# Patient Record
Sex: FEMALE | Race: WHITE | NOT HISPANIC OR LATINO | URBAN - METROPOLITAN AREA
[De-identification: names, ages, dates, MRNs, and addresses within clinical notes are randomized per-mention and may not be internally consistent; named-entity substitution may affect disease eponyms.]

---

## 2021-06-12 ENCOUNTER — EMERGENCY (EMERGENCY)
Facility: HOSPITAL | Age: 86
LOS: 1 days | Discharge: HOME | End: 2021-06-12
Attending: EMERGENCY MEDICINE | Admitting: EMERGENCY MEDICINE
Payer: MEDICARE

## 2021-06-12 VITALS
HEART RATE: 95 BPM | OXYGEN SATURATION: 98 % | WEIGHT: 169.98 LBS | TEMPERATURE: 98 F | SYSTOLIC BLOOD PRESSURE: 154 MMHG | DIASTOLIC BLOOD PRESSURE: 78 MMHG | HEIGHT: 67 IN | RESPIRATION RATE: 17 BRPM

## 2021-06-12 VITALS
DIASTOLIC BLOOD PRESSURE: 84 MMHG | RESPIRATION RATE: 18 BRPM | OXYGEN SATURATION: 97 % | TEMPERATURE: 98 F | SYSTOLIC BLOOD PRESSURE: 163 MMHG | HEART RATE: 92 BPM

## 2021-06-12 DIAGNOSIS — Z96.642 PRESENCE OF LEFT ARTIFICIAL HIP JOINT: Chronic | ICD-10-CM

## 2021-06-12 DIAGNOSIS — Z96.659 PRESENCE OF UNSPECIFIED ARTIFICIAL KNEE JOINT: Chronic | ICD-10-CM

## 2021-06-12 DIAGNOSIS — Z96.649 PRESENCE OF UNSPECIFIED ARTIFICIAL HIP JOINT: Chronic | ICD-10-CM

## 2021-06-12 PROCEDURE — 73610 X-RAY EXAM OF ANKLE: CPT

## 2021-06-12 PROCEDURE — 73610 X-RAY EXAM OF ANKLE: CPT | Mod: 26,LT

## 2021-06-12 PROCEDURE — 99284 EMERGENCY DEPT VISIT MOD MDM: CPT | Mod: 25

## 2021-06-12 PROCEDURE — 73630 X-RAY EXAM OF FOOT: CPT

## 2021-06-12 PROCEDURE — 99283 EMERGENCY DEPT VISIT LOW MDM: CPT

## 2021-06-12 PROCEDURE — 73630 X-RAY EXAM OF FOOT: CPT | Mod: 26,LT

## 2021-06-12 NOTE — ED PROVIDER NOTE - PROGRESS NOTE DETAILS
Spoke with radiologist, no acute fractures on xrays, will place in ace wrap, RICE, nsaids for pain, f/u ortho.

## 2021-06-12 NOTE — ED ADULT NURSE NOTE - CAS DISCH ACCOMP BY
74 yo F with HTN, HLD, Hypothyroid, previous breast CA s/p lumpectomy in 2014 that is monitored yearly with mammograms that presents with chest pain. Benign exam and pt appears well. Now not tachy but HR in 90s. Pt reports she has a cardiologist Dr Eric Alarcon, has not seen him in possibly 2 years. never had a stress and last ECHO was possible 2 years ago. Concern is for ACS given RBBB on EKG which is unknown if new as pt and  reports EKG "always abnormal". Usual care at [facility] so no record of EKG in our system. Pt has no other associated symptoms of ACS but will consider ACS and PE in work up given tachy and chest discomfort. Will obtain labs, imaging, place on monitor and provide ASA and reassess. friend

## 2021-06-12 NOTE — ED ADULT NURSE NOTE - NSIMPLEMENTINTERV_GEN_ALL_ED
Implemented All Fall with Harm Risk Interventions:  [place] to call system. Call bell, personal items and telephone within reach. Instruct patient to call for assistance. Room bathroom lighting operational. Non-slip footwear when patient is off stretcher. Physically safe environment: no spills, clutter or unnecessary equipment. Stretcher in lowest position, wheels locked, appropriate side rails in place. Provide visual cue, wrist band, yellow gown, etc. Monitor gait and stability. Monitor for mental status changes and reorient to person, place, and time. Review medications for side effects contributing to fall risk. Reinforce activity limits and safety measures with patient and family. Provide visual clues: red socks.

## 2021-06-12 NOTE — ED ADULT NURSE NOTE - OBJECTIVE STATEMENT
Patient brought to ED by family friend. Patient tripped over the carpet at home 2 days ago and injured her left ankle. Denies any other injury, no head trauma, no LOC. Patient noted to have swelling across ankle with pain mostly localized lateral aspect. Patient has been able to weight bear but with difficulty and has been using a cane to get around which is not per her norm.

## 2021-06-12 NOTE — ED PROVIDER NOTE - OBJECTIVE STATEMENT
89 y/o F with no pmhx presents with c/o L ankle pain x 3 days. Pt states that she tripped on the rug at home on Thursday, 6/10 and injured her L ankle. States that she has swelling to her ankle and foot L. Denies applying ice, elevating leg or taking any pain meds at home. Denies head trauma, LOC, use of blood thinners, open wounds, numbness, tingling, neck/back/hip pain, other injuries/symptoms.

## 2021-06-12 NOTE — ED PROVIDER NOTE - MUSCULOSKELETAL, MLM
L foot: +ttp left lateral malleolus with swelling noted to ankle and foot L, skin intact, toes warm & mobile, pulses and sensation intact, +mild ecchymosis noted to base of medial proximal foot, skin intact, nl cap refill<2sec, NVI; L hip/thigh/knee/tib-fib NT with FROM

## 2021-06-12 NOTE — ED PROVIDER NOTE - CLINICAL SUMMARY MEDICAL DECISION MAKING FREE TEXT BOX
89 yo F with L ankle pain sp tripping on rug at home 3 days ago, no head trauma/loc/other injuries/symptoms, +swelling noted to L foot and ankle, NVI, pt declined pain meds in ED, will apply ice, elevate, xrays, splint/ace wrap, f/u ortho

## 2021-06-12 NOTE — ED ADULT NURSE NOTE - PSH
History of hip replacement, total, left    History of total hip arthroplasty  right with pinning  History of total knee replacement  right

## 2021-06-12 NOTE — ED PROVIDER NOTE - ATTENDING CONTRIBUTION TO CARE
89 yo F p/w left foot and ankle swelling x 3 days after mechanical trip and fall over a rug 3 days ago. no head trauma, loc, asa/plavix/blood thinners. declining analgesia. did not RICE it.   VSS  Left foot and ankle edema.   FROM ankle actively  +Right DP pulses +2  sensation intact    consider sprain vs fx  xray, rice, reassess

## 2021-06-12 NOTE — ED PROVIDER NOTE - CARE PROVIDER_API CALL
Triston Stokes  ORTHOPAEDIC SURGERY  [address], [address]  [place] [address]  Phone: [phone]  Fax: [phone]  Follow Up Time: 1-3 Days

## 2021-06-12 NOTE — ED PROVIDER NOTE - NSFOLLOWUPINSTRUCTIONS_ED_ALL_ED_FT
Follow up with Orthopedist in 1-2 days for re-evaluation, ongoing care and treatment. Rest, elevate ankle/foot, ace wrap for compressesion, motrin 600mg every 6 hours with food as needed for pain. If having worsening of symptoms or other related symptoms, RETURN TO THE ER IMMEDIATELY.     Ankle Sprain    WHAT YOU NEED TO KNOW:    An ankle sprain happens when 1 or more ligaments in your ankle joint stretch or tear. Ligaments are tough tissues that connect bones. Ligaments support your joints and keep your bones in place.    DISCHARGE INSTRUCTIONS:    Return to the emergency department if:   •You have severe pain in your ankle.      •Your foot or toes are cold or numb.      •Your ankle becomes more weak or unstable (wobbly).      •You are unable to put any weight on your ankle or foot.      •Your swelling has increased or returned.      Call your doctor if:   •Your pain does not go away, even after treatment.      •You have questions or concerns about your condition or care.      Medicines: You may need any of the following:   •NSAIDs, such as ibuprofen, help decrease swelling, pain, and fever. This medicine is available with or without a doctor's order. NSAIDs can cause stomach bleeding or kidney problems in certain people. If you take blood thinner medicine, always ask your healthcare provider if NSAIDs are safe for you. Always read the medicine label and follow directions.      •Acetaminophen decreases pain and fever. It is available without a doctor's order. Ask how much to take and how often to take it. Follow directions. Read the labels of all other medicines you are using to see if they also contain acetaminophen, or ask your doctor or pharmacist. Acetaminophen can cause liver damage if not taken correctly. Do not use more than 4 grams (4,000 milligrams) total of acetaminophen in one day.       •Prescription pain medicine may be given. Ask your healthcare provider how to take this medicine safely. Some prescription pain medicines contain acetaminophen. Do not take other medicines that contain acetaminophen without talking to your healthcare provider. Too much acetaminophen may cause liver damage. Prescription pain medicine may cause constipation. Ask your healthcare provider how to prevent or treat constipation.       •Take your medicine as directed. Contact your healthcare provider if you think your medicine is not helping or if you have side effects. Tell him or her if you are allergic to any medicine. Keep a list of the medicines, vitamins, and herbs you take. Include the amounts, and when and why you take them. Bring the list or the pill bottles to follow-up visits. Carry your medicine list with you in case of an emergency.      Self-care:   •Use support devices, such as a brace, cast, or splint, to limit your movement and protect your joint. You may need to use crutches to decrease your pain as you move around.      •Go to physical therapy as directed. A physical therapist teaches you exercises to help improve movement and strength, and to decrease pain.      •Rest your ankle so that it can heal. Return to normal activities as directed.      •Apply ice on your ankle for 15 to 20 minutes every hour or as directed. Use an ice pack, or put crushed ice in a plastic bag. Cover it with a towel. Ice helps prevent tissue damage and decreases swelling and pain.      •Compress your ankle. Ask if you should wrap an elastic bandage around your injured ligament. An elastic bandage provides support and helps decrease swelling and movement so your joint can heal. Wear as long as directed.  How to Wrap an Elastic Bandage           •Elevate your ankle above the level of your heart as often as you can. This will help decrease swelling and pain. Prop your ankle on pillows or blankets to keep it elevated comfortably.   Elevate Leg           Prevent another ankle sprain:   •Let your ankle heal. Find out how long your ligament needs to heal. Do not do any physical activity until your healthcare provider says it is okay. If you start activity too soon, you may develop a more serious injury.      •Always warm up and stretch before you exercise or play sports.      •Use the right equipment. Always wear shoes that fit well and are made for the activity that you are doing. You may also need ankle supports, elbow and knee pads, or braces.      Follow up with your doctor as directed: Write down your questions so you remember to ask them during your visits.

## 2021-06-12 NOTE — ED PROVIDER NOTE - PATIENT PORTAL LINK FT
You can access the FollowMyHealth Patient Portal offered by [facility] by registering at the following website: [email or web address]. By joining [employer]’s FollowMyHealth portal, you will also be able to view your health information using other applications (apps) compatible with our system.

## 2021-06-22 NOTE — ED ADULT TRIAGE NOTE - AS HEIGHT TYPE
Subjective: Patient comes in for follow-up.  She is presently taking medication for anxiety/panic attacks.  As well as headaches.    She is no longer on any venlafaxine she is tried multiple SSRIs included sertraline Lexapro fluoxetine and Paxil.  Also has tried BuSpar in the past    She will be seeing a psychologist, Flora for counseling on 1/11/2019.  Patient was up in [place] for most of December and was not able to see the psychiatrist in mid December at [facility].    Patient will be moving to [place] in the next 4-5 weeks.    We discussed options I think she should continue to see Flora until she leaves.    We discussed options regarding medication.  I did refill her Ativan is to take 0.5 mg 1 twice daily as needed but only take out for more severe panic attacks.  That should last her until she is seen in the [place] area.  Also she can use the trazodone at night.    She does smoke half pack per day but quit a couple days ago.    We discussed trying Wellbutrin for the smoking cessation in the anxiety and she will be on 150 mg extended release once a day.  She will start that today.    Finally she has had some headache she describes them as pain in through the back of the neck usually bilaterally wraps around the occipital area towards the frontal area at times and becomes quite severe.    At times she feels like the light bothers her    There is no throbbing component to it does not start in the front is not one-sided.  No vomiting    Tobacco status: She  reports that she has quit smoking. She smoked 0.25 packs per day. she has never used smokeless tobacco.    Patient Active Problem List    Diagnosis Date Noted     Generalized anxiety disorder 11/20/2018     Panic attacks 11/20/2018     Current severe episode of major depressive disorder without psychotic features without prior episode (H) 11/20/2018     Seasonal allergies 06/12/2018     Headache 06/12/2018     Anxiety 06/12/2018      Birth control 06/12/2018       Current Outpatient Medications   Medication Sig Dispense Refill     traZODone (DESYREL) 50 MG tablet 1-2 po qhs. 60 tablet 1     buPROPion (WELLBUTRIN XL) 150 MG 24 hr tablet Take 1 tablet (150 mg total) by mouth every morning. 30 tablet 1     famotidine (PEPCID) 20 MG tablet Take 1 tablet (20 mg total) by mouth 2 (two) times a day. 30 tablet 0     LORazepam (ATIVAN) 0.5 MG tablet 1 po two times a day as needed for panic attacks 30 tablet 0     naproxen (NAPROSYN) 500 MG tablet 1 po two times a day with food as needed for headache 40 tablet 1     No current facility-administered medications for this visit.        ROS:   10 point review of systems positive as outlined otherwise negative.    I did review the patient's emergency room visit on 12/20/2018 when she is at [facility].  She got 20 tablets of the Ativan at the she has about 2 or 3 left.    Objective:    /66 (Patient Site: Left Arm, Patient Position: Sitting, Cuff Size: Adult Regular)   Pulse 84   Resp 12   Wt 118 lb (53.5 kg)   LMP 11/20/2018   BMI 20.57 kg/m    Body mass index is 20.57 kg/m .      Interval appearance no acute distress    Heart was regular vital signs stable she seemed a little calmer here today than in the past.    Lungs are clear no rales or rhonchi    Presently has just minimal symptoms in through cranial nerves intact.  No radicular findings in through the arms.    Assessment:  1. Anxiety  LORazepam (ATIVAN) 0.5 MG tablet    buPROPion (WELLBUTRIN XL) 150 MG 24 hr tablet   2. Panic attack  LORazepam (ATIVAN) 0.5 MG tablet    buPROPion (WELLBUTRIN XL) 150 MG 24 hr tablet   3. Encounter for tobacco use cessation counseling  buPROPion (WELLBUTRIN XL) 150 MG 24 hr tablet   4. Muscle contraction headache  naproxen (NAPROSYN) 500 MG tablet     Total time the patient 25 minutes over 25 minutes spent in counseling regarding her anxiety and smoking cessation, and headaches.    As outlined we will  start Wellbutrin.  30 tablets of Ativan given that should last her until she is seen up in [place]    Recheck in 3 weeks if still here    See counseling as outlined, see psychiatry in [place] set up the appointment    Plan: As discussed above    This transcription uses voice recognition software, which may contain typographical errors.   stated

## 2023-02-05 ENCOUNTER — NON-APPOINTMENT (OUTPATIENT)
Age: 88
End: 2023-02-05

## 2023-02-07 PROBLEM — Z00.00 ENCOUNTER FOR PREVENTIVE HEALTH EXAMINATION: Status: ACTIVE | Noted: 2023-02-07

## 2023-02-13 ENCOUNTER — APPOINTMENT (OUTPATIENT)
Dept: ORTHOPEDIC SURGERY | Facility: CLINIC | Age: 88
End: 2023-02-13
Payer: MEDICARE

## 2023-02-13 VITALS — HEIGHT: 67 IN | BODY MASS INDEX: 26.68 KG/M2 | WEIGHT: 170 LBS

## 2023-02-13 DIAGNOSIS — M16.12 UNILATERAL PRIMARY OSTEOARTHRITIS, LEFT HIP: ICD-10-CM

## 2023-02-13 DIAGNOSIS — M16.11 UNILATERAL PRIMARY OSTEOARTHRITIS, RIGHT HIP: ICD-10-CM

## 2023-02-13 PROCEDURE — 99204 OFFICE O/P NEW MOD 45 MIN: CPT

## 2023-02-13 PROCEDURE — 73502 X-RAY EXAM HIP UNI 2-3 VIEWS: CPT

## 2023-02-13 NOTE — HISTORY OF PRESENT ILLNESS
[de-identified] : This is a 89-year-old female presenting with complaint of pain right buttocks.  1 week ago patient developed spontaneous onset of pain right buttock provoked by getting up from seated position.  When she is recumbent she is symptom-free she denies right groin or right thigh pain.  Patient does report a past surgical history fracture right hip treated by pending.  She is status post left total hip replacement 20 years ago with no complaints and right total knee knee replacement 2019 with no complaints.  She has started using a 2 canes to ambulate because of right buttock pain.  Patient lives independently.Patient reports symptoms unresponsive to Tylenol
N/A

## 2023-02-13 NOTE — DISCUSSION/SUMMARY
[de-identified] : Impression osteoarthritis right hip\par Plan trial of over-the-counter NSAIDs, follow-up 1 month, Alternate between cane and walker.  If patient is unresponsive and if quality life sufficiently compromised she may be a candidate for total hip replacement

## 2023-02-13 NOTE — PHYSICAL EXAM
[de-identified] : Constitutional:Well nourished , well developed and in no acute distress\par Psychiatric: Alert and oriented to time place and person.Appropriate affect \par Skin:Head, neck, arms and lower extremities:no lesions or discoloration\par HEENT: Normocephalic, EOM intact, Nasal septum midline,\par Respiratory: Unlabored respirations,no audible wheezing ,no tachypnea, no cyanosis\par Cardiovascular: no leg swelling  no ankle edema no JVD, pulse regular\par Vascular: no calf or thigh tenderness, \par Peripheral pulses; intact\par Lymphatics:No groin adenopathy,no lymphedema lower  or upper extremities\par Right hip mild restricted passive range of motion.  Passive rotation provokes pain right buttock. [de-identified] : X-ray AP pelvis right hip AP lateral reveals right hip healed intertrochanteric fracture there is right hip joint space central narrowing.  There is disengagement of compression screw from right DHS lag screw with complete compression screw adjacent to the lateral femur.  There is left total hip replacement

## 2023-02-21 ENCOUNTER — INPATIENT (INPATIENT)
Facility: HOSPITAL | Age: 88
LOS: 2 days | Discharge: HOME | DRG: 552 | End: 2023-02-24
Attending: STUDENT IN AN ORGANIZED HEALTH CARE EDUCATION/TRAINING PROGRAM | Admitting: INTERNAL MEDICINE
Payer: MEDICARE

## 2023-02-21 VITALS
SYSTOLIC BLOOD PRESSURE: 149 MMHG | WEIGHT: 169.98 LBS | HEIGHT: 68 IN | HEART RATE: 81 BPM | TEMPERATURE: 98 F | RESPIRATION RATE: 18 BRPM | DIASTOLIC BLOOD PRESSURE: 69 MMHG | OXYGEN SATURATION: 96 %

## 2023-02-21 DIAGNOSIS — Z96.649 PRESENCE OF UNSPECIFIED ARTIFICIAL HIP JOINT: Chronic | ICD-10-CM

## 2023-02-21 DIAGNOSIS — S32.10XA UNSPECIFIED FRACTURE OF SACRUM, INITIAL ENCOUNTER FOR CLOSED FRACTURE: ICD-10-CM

## 2023-02-21 DIAGNOSIS — Z96.659 PRESENCE OF UNSPECIFIED ARTIFICIAL KNEE JOINT: Chronic | ICD-10-CM

## 2023-02-21 DIAGNOSIS — Z96.642 PRESENCE OF LEFT ARTIFICIAL HIP JOINT: Chronic | ICD-10-CM

## 2023-02-21 LAB
ALBUMIN SERPL ELPH-MCNC: 2.7 G/DL — LOW (ref 3.3–5)
ALP SERPL-CCNC: 146 U/L — HIGH (ref 40–120)
ALT FLD-CCNC: 33 U/L — SIGNIFICANT CHANGE UP (ref 10–45)
ANION GAP SERPL CALC-SCNC: 1 MMOL/L — LOW (ref 5–17)
APPEARANCE UR: CLEAR — SIGNIFICANT CHANGE UP
AST SERPL-CCNC: 30 U/L — SIGNIFICANT CHANGE UP (ref 10–40)
BACTERIA # UR AUTO: NEGATIVE /HPF — SIGNIFICANT CHANGE UP
BILIRUB SERPL-MCNC: 0.2 MG/DL — SIGNIFICANT CHANGE UP (ref 0.2–1.2)
BILIRUB UR-MCNC: NEGATIVE — SIGNIFICANT CHANGE UP
BUN SERPL-MCNC: 37 MG/DL — HIGH (ref 7–23)
CALCIUM SERPL-MCNC: 8.9 MG/DL — SIGNIFICANT CHANGE UP (ref 8.4–10.5)
CHLORIDE SERPL-SCNC: 101 MMOL/L — SIGNIFICANT CHANGE UP (ref 96–108)
CK SERPL-CCNC: 112 U/L — SIGNIFICANT CHANGE UP (ref 25–170)
CO2 SERPL-SCNC: 32 MMOL/L — HIGH (ref 22–31)
COLOR SPEC: YELLOW — SIGNIFICANT CHANGE UP
CREAT SERPL-MCNC: 1.03 MG/DL — SIGNIFICANT CHANGE UP (ref 0.5–1.3)
DIFF PNL FLD: NEGATIVE — SIGNIFICANT CHANGE UP
EGFR: 51 ML/MIN/1.73M2 — LOW
EPI CELLS # UR: SIGNIFICANT CHANGE UP
GLUCOSE SERPL-MCNC: 119 MG/DL — HIGH (ref 70–99)
GLUCOSE UR QL: NEGATIVE — SIGNIFICANT CHANGE UP
HCT VFR BLD CALC: 36 % — SIGNIFICANT CHANGE UP (ref 34.5–45)
HGB BLD-MCNC: 11.5 G/DL — SIGNIFICANT CHANGE UP (ref 11.5–15.5)
KETONES UR-MCNC: NEGATIVE — SIGNIFICANT CHANGE UP
LEUKOCYTE ESTERASE UR-ACNC: NEGATIVE — SIGNIFICANT CHANGE UP
MCHC RBC-ENTMCNC: 27.9 PG — SIGNIFICANT CHANGE UP (ref 27–34)
MCHC RBC-ENTMCNC: 31.9 GM/DL — LOW (ref 32–36)
MCV RBC AUTO: 87.4 FL — SIGNIFICANT CHANGE UP (ref 80–100)
NITRITE UR-MCNC: NEGATIVE — SIGNIFICANT CHANGE UP
NRBC # BLD: 0 /100 WBCS — SIGNIFICANT CHANGE UP (ref 0–0)
PH UR: 7 — SIGNIFICANT CHANGE UP (ref 5–8)
PLATELET # BLD AUTO: 421 K/UL — HIGH (ref 150–400)
POTASSIUM SERPL-MCNC: 4 MMOL/L — SIGNIFICANT CHANGE UP (ref 3.5–5.3)
POTASSIUM SERPL-SCNC: 4 MMOL/L — SIGNIFICANT CHANGE UP (ref 3.5–5.3)
PROT SERPL-MCNC: 8.2 G/DL — SIGNIFICANT CHANGE UP (ref 6–8.3)
PROT UR-MCNC: 15
RBC # BLD: 4.12 M/UL — SIGNIFICANT CHANGE UP (ref 3.8–5.2)
RBC # FLD: 15.8 % — HIGH (ref 10.3–14.5)
RBC CASTS # UR COMP ASSIST: SIGNIFICANT CHANGE UP /HPF (ref 0–4)
SARS-COV-2 RNA SPEC QL NAA+PROBE: SIGNIFICANT CHANGE UP
SODIUM SERPL-SCNC: 134 MMOL/L — LOW (ref 135–145)
SP GR SPEC: 1.01 — SIGNIFICANT CHANGE UP (ref 1.01–1.02)
UROBILINOGEN FLD QL: NEGATIVE — SIGNIFICANT CHANGE UP
WBC # BLD: 9.73 K/UL — SIGNIFICANT CHANGE UP (ref 3.8–10.5)
WBC # FLD AUTO: 9.73 K/UL — SIGNIFICANT CHANGE UP (ref 3.8–10.5)
WBC UR QL: SIGNIFICANT CHANGE UP /HPF (ref 0–5)

## 2023-02-21 PROCEDURE — 99223 1ST HOSP IP/OBS HIGH 75: CPT

## 2023-02-21 PROCEDURE — 99285 EMERGENCY DEPT VISIT HI MDM: CPT

## 2023-02-21 PROCEDURE — 70450 CT HEAD/BRAIN W/O DYE: CPT | Mod: 26,MA

## 2023-02-21 PROCEDURE — 93010 ELECTROCARDIOGRAM REPORT: CPT

## 2023-02-21 PROCEDURE — 72125 CT NECK SPINE W/O DYE: CPT | Mod: 26,MA

## 2023-02-21 PROCEDURE — 72170 X-RAY EXAM OF PELVIS: CPT | Mod: 26

## 2023-02-21 PROCEDURE — 71045 X-RAY EXAM CHEST 1 VIEW: CPT | Mod: 26

## 2023-02-21 PROCEDURE — 72131 CT LUMBAR SPINE W/O DYE: CPT | Mod: 26,MA

## 2023-02-21 RX ORDER — LATANOPROST 0.05 MG/ML
0 SOLUTION/ DROPS OPHTHALMIC; TOPICAL
Qty: 0 | Refills: 0 | DISCHARGE

## 2023-02-21 RX ORDER — IBUPROFEN 200 MG
400 TABLET ORAL THREE TIMES A DAY
Refills: 0 | Status: COMPLETED | OUTPATIENT
Start: 2023-02-22 | End: 2023-02-22

## 2023-02-21 RX ORDER — FAMOTIDINE 10 MG/ML
20 INJECTION INTRAVENOUS
Refills: 0 | Status: COMPLETED | OUTPATIENT
Start: 2023-02-22 | End: 2023-02-23

## 2023-02-21 RX ORDER — ACETAMINOPHEN 500 MG
650 TABLET ORAL ONCE
Refills: 0 | Status: COMPLETED | OUTPATIENT
Start: 2023-02-21 | End: 2023-02-21

## 2023-02-21 RX ORDER — CYCLOBENZAPRINE HYDROCHLORIDE 10 MG/1
5 TABLET, FILM COATED ORAL EVERY 12 HOURS
Refills: 0 | Status: COMPLETED | OUTPATIENT
Start: 2023-02-22 | End: 2023-02-23

## 2023-02-21 RX ORDER — IBUPROFEN 200 MG
400 TABLET ORAL ONCE
Refills: 0 | Status: COMPLETED | OUTPATIENT
Start: 2023-02-21 | End: 2023-02-21

## 2023-02-21 RX ORDER — SODIUM CHLORIDE 9 MG/ML
500 INJECTION, SOLUTION INTRAVENOUS
Refills: 0 | Status: COMPLETED | OUTPATIENT
Start: 2023-02-21 | End: 2023-02-21

## 2023-02-21 RX ORDER — AMLODIPINE BESYLATE 2.5 MG/1
5 TABLET ORAL DAILY
Refills: 0 | Status: DISCONTINUED | OUTPATIENT
Start: 2023-02-21 | End: 2023-02-24

## 2023-02-21 RX ORDER — SODIUM CHLORIDE 9 MG/ML
1000 INJECTION INTRAMUSCULAR; INTRAVENOUS; SUBCUTANEOUS ONCE
Refills: 0 | Status: COMPLETED | OUTPATIENT
Start: 2023-02-21 | End: 2023-02-21

## 2023-02-21 RX ORDER — LATANOPROST 0.05 MG/ML
1 SOLUTION/ DROPS OPHTHALMIC; TOPICAL EVERY 24 HOURS
Refills: 0 | Status: DISCONTINUED | OUTPATIENT
Start: 2023-02-21 | End: 2023-02-24

## 2023-02-21 RX ORDER — ACETAMINOPHEN 500 MG
650 TABLET ORAL EVERY 6 HOURS
Refills: 0 | Status: DISCONTINUED | OUTPATIENT
Start: 2023-02-21 | End: 2023-02-24

## 2023-02-21 RX ORDER — CYCLOBENZAPRINE HYDROCHLORIDE 10 MG/1
5 TABLET, FILM COATED ORAL ONCE
Refills: 0 | Status: COMPLETED | OUTPATIENT
Start: 2023-02-21 | End: 2023-02-21

## 2023-02-21 RX ORDER — IBUPROFEN 200 MG
400 TABLET ORAL THREE TIMES A DAY
Refills: 0 | Status: DISCONTINUED | OUTPATIENT
Start: 2023-02-23 | End: 2023-02-24

## 2023-02-21 RX ORDER — SALICYLIC ACID 0.5 %
1 CLEANSER (GRAM) TOPICAL DAILY
Refills: 0 | Status: DISCONTINUED | OUTPATIENT
Start: 2023-02-21 | End: 2023-02-24

## 2023-02-21 RX ADMIN — SODIUM CHLORIDE 1000 MILLILITER(S): 9 INJECTION INTRAMUSCULAR; INTRAVENOUS; SUBCUTANEOUS at 20:45

## 2023-02-21 RX ADMIN — LATANOPROST 1 DROP(S): 0.05 SOLUTION/ DROPS OPHTHALMIC; TOPICAL at 23:26

## 2023-02-21 RX ADMIN — SODIUM CHLORIDE 70 MILLILITER(S): 9 INJECTION, SOLUTION INTRAVENOUS at 23:27

## 2023-02-21 RX ADMIN — Medication 400 MILLIGRAM(S): at 23:25

## 2023-02-21 RX ADMIN — Medication 650 MILLIGRAM(S): at 17:47

## 2023-02-21 RX ADMIN — CYCLOBENZAPRINE HYDROCHLORIDE 5 MILLIGRAM(S): 10 TABLET, FILM COATED ORAL at 23:26

## 2023-02-21 RX ADMIN — SODIUM CHLORIDE 1000 MILLILITER(S): 9 INJECTION INTRAMUSCULAR; INTRAVENOUS; SUBCUTANEOUS at 19:41

## 2023-02-21 NOTE — H&P ADULT - NEUROLOGICAL
cranial nerves II-XII intact/sensation intact/responds to pain/responds to verbal commands/cranial nerves intact

## 2023-02-21 NOTE — ED ADULT NURSE REASSESSMENT NOTE - NS ED NURSE REASSESS COMMENT FT1
Pt. placed in hospital bed. Pt. given new linens and verbalizes comfort. Will medicate per order. Safety maintained. Will continue to monitor.

## 2023-02-21 NOTE — ED ADULT NURSE NOTE - OBJECTIVE STATEMENT
Pt fell down 3 days ago.  Pt states she has progressively more difficulty with ambulation, ADLs.  Pt c/o lower back pain.

## 2023-02-21 NOTE — H&P ADULT - HISTORY OF PRESENT ILLNESS
89-year-old woman with history of hypertension, bilateral hip arthroplasty, right knee arthroplasty presents with low back pain and difficulty with ambulation.  Patient has had progressive difficulty with ambulation over the last 3 weeks.  Has been unable to ambulate for the last 3 days.  Had a fall 2 weeks prior and a second fall 4 days ago.  Patient denies fever, headache, chest pain, shortness of breath, abdominal pain, dysuria.  Endorses subjective abnormal sensation in left leg.  Patient lives alone. 89-year-old woman with history of hypertension, bilateral hip arthroplasty, right knee arthroplasty presents with low back pain and difficulty with ambulation.  Patient has had progressive difficulty with ambulation over the last 3 weeks.  Has been unable to ambulate for the last 3 days.  Had a fall 2 weeks prior and a second fall 4 days ago.  Patient denies fever, headache, chest pain, shortness of breath, abdominal pain, dysuria.  Endorses subjective abnormal sensation in left leg.  Patient lives alone.    Brain CT: No acute hemorrhage, extra-axial collection or displaced   calvarial fracture.    Cervical spine CT: No acute fracture or traumatic subluxation.    Lumbar spine CT: Bilateral sacral germania insufficiency fractures.   Multilevel degenerative changes of the lumbar spine. 89-year-old woman with hypertension, glaucoma, arthritis, presents to the ED, c/o worsened low back pain and difficulty ambulating for the past 2-3 days.  Pt lives alone and at baseline, walks independently, w/no assistive devices.  Pt states she fell about 2 weeks ago bilateral hip arthroplasty, right knee arthroplasty presents with low back pain and difficulty with ambulation.  Patient has had progressive difficulty with ambulation over the last 3 weeks.  Has been unable to ambulate for the last 3 days.  Had a fall 2 weeks prior and a second fall 4 days ago.  Patient denies fever, headache, chest pain, shortness of breath, abdominal pain, dysuria.  Endorses subjective abnormal sensation in left leg.  Patient lives alone.    Brain CT: No acute hemorrhage, extra-axial collection or displaced   calvarial fracture.    Cervical spine CT: No acute fracture or traumatic subluxation.    Lumbar spine CT: Bilateral sacral germania insufficiency fractures.   Multilevel degenerative changes of the lumbar spine. 89-year-old woman with hypertension, glaucoma, arthritis, presents to the ED, c/o worsened low back pain and difficulty ambulating for the past 2-3 days.  Pt lives alone and at baseline, walks independently, w/no assistive devices.  Pt states she fell on her buttocks at home, about 2 weeks ago, and she did see ortho.  Pt states the pain did improve after a week, also take s Tylenol prn,  However, she fell again about 3 days ago.  Pt was okay. until for the past 2 days, low back pain has worsened, even with minimal movement.  Pt also reports pain not relieved with Tylenol. pt started taking Advil, but last time was yesterday.  Today, pt with difficulty standing and walking around due to the low back pain.  She deniesnumbness, tingling, she is able to move all her extremities.  She denies fever, chills, chest pain, headache, dizziness, cough, dyspnea, dysuria.  She had nml BM and urination.   Ct head neg for acute hemorrhage, extra-axial collection or displaced calvarial fracture.  Cervical spine CT showed no acute fracture or traumatic subluxation. Lumbar spine CT showed  bilateral sacral germania insufficiency fractures. Multilevel degenerative changes of the lumbar spine.

## 2023-02-21 NOTE — ED ADULT NURSE NOTE - NSIMPLEMENTINTERV_GEN_ALL_ED
Implemented All Fall with Harm Risk Interventions:  [place] to call system. Call bell, personal items and telephone within reach. Instruct patient to call for assistance. Room bathroom lighting operational. Non-slip footwear when patient is off stretcher. Physically safe environment: no spills, clutter or unnecessary equipment. Stretcher in lowest position, wheels locked, appropriate side rails in place. Provide visual cue, wrist band, yellow gown, etc. Monitor gait and stability. Monitor for mental status changes and reorient to person, place, and time. Review medications for side effects contributing to fall risk. Reinforce activity limits and safety measures with patient and family. Provide visual clues: red socks.

## 2023-02-21 NOTE — H&P ADULT - NSICDXPASTMEDICALHX_GEN_ALL_CORE_FT
PAST MEDICAL HISTORY:  No pertinent past medical history      PAST MEDICAL HISTORY:  Hypertension

## 2023-02-21 NOTE — ED PROVIDER NOTE - CLINICAL SUMMARY MEDICAL DECISION MAKING FREE TEXT BOX
Patient given Tylenol for pain, fluids for dehydration.  Patient with history as described presents with low back pain,, 2 recent falls.  Vital signs are stable.  Patient is comfortable appearing.  Exam notable for tenderness palpation over sacrum.  No focal neurologic deficits appreciated.  Labs unremarkable.  Imaging notable for bilateral sacral insufficiency fractures. Will admit for orthopedics adn PT eval. Unsafe discharge

## 2023-02-21 NOTE — H&P ADULT - NSHPPHYSICALEXAM_GEN_ALL_CORE
Vital Signs (24 Hrs):  T(C): 36.4 (02-21-23 @ 19:43), Max: 36.9 (02-21-23 @ 16:54)  HR: 76 (02-21-23 @ 19:43) (76 - 81)  BP: 149/88 (02-21-23 @ 19:43) (149/69 - 149/88)  RR: 17 (02-21-23 @ 19:43) (17 - 18)  SpO2: 96% (02-21-23 @ 19:43) (96% - 96%)  Daily Height in cm: 172.72 (21 Feb 2023 16:54)

## 2023-02-21 NOTE — ED PROVIDER NOTE - SKIN, MLM
Erythematous rash to left lower leg with some vesicles, patient states it is chronic and she follows with dermatologist

## 2023-02-21 NOTE — H&P ADULT - ASSESSMENT
89-year-old woman with hypertension, glaucoma, arthritis, presents to the ED, c/o worsened low back pain and difficulty ambulating for the past 2-3 days.  Pt lives alone and at baseline, walks independently, w/no assistive devices.  Pt states she fell on her buttocks at home, about 2 weeks ago, and she did see ortho.  Pt states the pain did improve after a week, also take s Tylenol prn,  However, she fell again about 3 days ago.  Pt was okay. until for the past 2 days, low back pain has worsened, even with minimal movement. Today, pt with difficulty standing and walking around due to the low back pain.    Ct head neg for acute hemorrhage, extra-axial collection or displaced calvarial fracture.  Cervical spine CT showed no acute fracture or traumatic subluxation. Lumbar spine CT showed  bilateral sacral germania insufficiency fractures. Multilevel degenerative changes of the lumbar spine.    Acute lower back pain, worsened, with impaired mobility (due to pain)  Bilateral sacral germania insufficiency fractures, s/p fall at home  Mild hyponatremia, mild HELDER, mild dehydration  Elderly female, lives alone  Dry skin, w/scabs  HTN, glaucoma, stable    Admit  Analgesics, muscle relaxants prn  IV hydration  PT eval  Ortho consult- Dr Nilo Mckeon current meds: Amlodipine, Latanoprost eye drops  Social work referral  FF up labs  skin/wound care - vit A/D ointment to legs  GI/DVT prophylaxis 89-year-old woman with hypertension, glaucoma, arthritis, presents to the ED, c/o worsened low back pain and difficulty ambulating for the past 2-3 days.  Pt lives alone and at baseline, walks independently, w/no assistive devices.  Pt states she fell on her buttocks at home, about 2 weeks ago, and she did see ortho.  Pt states the pain did improve after a week, also take s Tylenol prn,  However, she fell again about 3 days ago.  Pt was okay. until for the past 2 days, low back pain has worsened, even with minimal movement. Today, pt with difficulty standing and walking around due to the low back pain.    Ct head neg for acute hemorrhage, extra-axial collection or displaced calvarial fracture.  Cervical spine CT showed no acute fracture or traumatic subluxation. Lumbar spine CT showed  bilateral sacral germania insufficiency fractures. Multilevel degenerative changes of the lumbar spine.    Acute lower back pain, worsened, with impaired mobility (due to pain)  Bilateral sacral germania insufficiency fractures, s/p fall at home  Mild hyponatremia, mild HELDER, mild dehydration  Elderly female, lives alone  Dry skin, w/scabs  HTN, glaucoma, stable    Admit  Analgesics, muscle relaxants prn  IV hydration  PT eval  Ortho consult  Cont current meds: Amlodipine, Latanoprost eye drops  Social work referral  FF up labs  skin/wound care - vit A/D ointment to legs  GI/DVT prophylaxis 89-year-old woman with hypertension, glaucoma, arthritis, presents to the ED, c/o worsened low back pain and difficulty ambulating for the past 2-3 days.  Pt lives alone and at baseline, walks independently, w/no assistive devices.  Pt states she fell on her buttocks at home, about 2 weeks ago, and she did see ortho.  Pt states the pain did improve after a week, also take s Tylenol prn,  However, she fell again about 3 days ago.  Pt was okay. until for the past 2 days, low back pain has worsened, even with minimal movement. Today, pt with difficulty standing and walking around due to the low back pain.    Ct head neg for acute hemorrhage, extra-axial collection or displaced calvarial fracture.  Cervical spine CT showed no acute fracture or traumatic subluxation. Lumbar spine CT showed  bilateral sacral germania insufficiency fractures. Multilevel degenerative changes of the lumbar spine.    Acute lower back pain, worsened, with impaired mobility, gait abnormality (due to pain)  Bilateral sacral germania insufficiency fractures, s/p fall at home  Mild hyponatremia, mild HELDER, mild dehydration  Elderly female, lives alone  Dry skin, w/scabs  HTN, glaucoma, stable    Admit  Analgesics, muscle relaxants prn  IV hydration  PT eval  Ortho consult  Cont current meds: Amlodipine, Latanoprost eye drops  Social work referral  FF up labs  skin/wound care - vit A/D ointment to legs  GI/DVT prophylaxis 89-year-old woman with hypertension, glaucoma, arthritis, presents to the ED, c/o worsened low back pain and difficulty ambulating for the past 2-3 days.  Pt lives alone and at baseline, walks independently, w/no assistive devices.  Pt states she fell on her buttocks at home, about 2 weeks ago, and she did see ortho.  Pt states the pain did improve after a week, also take s Tylenol prn,  However, she fell again about 3 days ago.  Pt was okay. until for the past 2 days, low back pain has worsened, even with minimal movement. Today, pt with difficulty standing and walking around due to the low back pain.    Ct head neg for acute hemorrhage, extra-axial collection or displaced calvarial fracture.  Cervical spine CT showed no acute fracture or traumatic subluxation. Lumbar spine CT showed  bilateral sacral germania insufficiency fractures. Multilevel degenerative changes of the lumbar spine.    Acute lower back pain, worsened, with impaired mobility, gait abnormality (due to pain)  Bilateral sacral germania insufficiency fractures, s/p fall at home  Mild hyponatremia, mild HELDER, mild dehydration  Elderly female, lives alone  Dry skin, w/scabs-dermatitis  HTN, glaucoma, stable    Admit  Analgesics, muscle relaxants prn  IV hydration  PT eval  Ortho consult  Cont current meds: Amlodipine, Latanoprost eye drops  Social work referral  FF up labs  skin/wound care - vit A/D ointment to legs  GI/DVT prophylaxis

## 2023-02-21 NOTE — ED PROVIDER NOTE - NEUROLOGICAL, MLM
Alert and oriented, no focal deficits, no motor or sensory deficits.5 out of 5 strength in lower extremities bilaterally

## 2023-02-21 NOTE — H&P ADULT - NSICDXPASTSURGICALHX_GEN_ALL_CORE_FT
PAST SURGICAL HISTORY:  History of hip replacement, total, left     History of total hip arthroplasty right with pinning    History of total knee replacement right

## 2023-02-21 NOTE — H&P ADULT - GASTROINTESTINAL
soft/nontender/nondistended/normal active bowel sounds/no guarding/no rigidity/no organomegaly/no palpable dorita

## 2023-02-21 NOTE — ED ADULT NURSE REASSESSMENT NOTE - NS ED NURSE REASSESS COMMENT FT1
Initial pt. assessment completed. Pt. verbalizing comfort at rest. Skin checked with Katelynn DOMINGUEZ. No skin issues noted to sacrum or back. IVF started per order. Nasal swab collected and sent to lab. Covid results pending. VS per flow sheet. Safety maintained. Will continue to monitor.

## 2023-02-21 NOTE — ED PROVIDER NOTE - OBJECTIVE STATEMENT
89-year-old woman with history of hypertension, bilateral hip arthroplasty, right knee arthroplasty presents with low back pain and difficulty with ambulation.  Patient has had progressive difficulty with ambulation over the last 3 weeks.  Has been unable to ambulate for the last 3 days.  Had a fall 2 weeks prior and a second fall 4 days ago.  Patient denies fever, headache, chest pain, shortness of breath, abdominal pain, dysuria.  Endorses subjective abnormal sensation in left leg.  Patient lives alone.

## 2023-02-21 NOTE — ED ADULT NURSE NOTE - SUICIDE SCREENING QUESTION 2
She has an appointment with Dr. Johnson and he also addresses hand issues. We don't refer to general surgery for hand issues.    No

## 2023-02-21 NOTE — ED ADULT TRIAGE NOTE - CHIEF COMPLAINT QUOTE
Pt BIBA from home c/o lower back pain s/p tripped and fell 3 days ago, denies head injury no blood thinners

## 2023-02-21 NOTE — ED ADULT NURSE NOTE - NS ED NURSE DISCH DISPOSITION
[de-identified] : cough [FreeTextEntry6] : wet cough\par seen on 7/26 with + RVP for Parainfluenza\par no current fevers\par sleeps ok\par not in pain Admitted

## 2023-02-22 LAB
ANION GAP SERPL CALC-SCNC: 5 MMOL/L — SIGNIFICANT CHANGE UP (ref 5–17)
BUN SERPL-MCNC: 28 MG/DL — HIGH (ref 7–23)
CALCIUM SERPL-MCNC: 8.5 MG/DL — SIGNIFICANT CHANGE UP (ref 8.4–10.5)
CHLORIDE SERPL-SCNC: 101 MMOL/L — SIGNIFICANT CHANGE UP (ref 96–108)
CO2 SERPL-SCNC: 29 MMOL/L — SIGNIFICANT CHANGE UP (ref 22–31)
CREAT SERPL-MCNC: 0.71 MG/DL — SIGNIFICANT CHANGE UP (ref 0.5–1.3)
EGFR: 80 ML/MIN/1.73M2 — SIGNIFICANT CHANGE UP
GLUCOSE SERPL-MCNC: 76 MG/DL — SIGNIFICANT CHANGE UP (ref 70–99)
MAGNESIUM SERPL-MCNC: 1.8 MG/DL — SIGNIFICANT CHANGE UP (ref 1.6–2.6)
POTASSIUM SERPL-MCNC: 3.4 MMOL/L — LOW (ref 3.5–5.3)
POTASSIUM SERPL-SCNC: 3.4 MMOL/L — LOW (ref 3.5–5.3)
SODIUM SERPL-SCNC: 135 MMOL/L — SIGNIFICANT CHANGE UP (ref 135–145)

## 2023-02-22 PROCEDURE — 72192 CT PELVIS W/O DYE: CPT | Mod: 26

## 2023-02-22 PROCEDURE — 99233 SBSQ HOSP IP/OBS HIGH 50: CPT

## 2023-02-22 PROCEDURE — 76376 3D RENDER W/INTRP POSTPROCES: CPT | Mod: 26

## 2023-02-22 RX ORDER — POTASSIUM CHLORIDE 20 MEQ
40 PACKET (EA) ORAL ONCE
Refills: 0 | Status: COMPLETED | OUTPATIENT
Start: 2023-02-22 | End: 2023-02-22

## 2023-02-22 RX ORDER — ENOXAPARIN SODIUM 100 MG/ML
40 INJECTION SUBCUTANEOUS EVERY 24 HOURS
Refills: 0 | Status: DISCONTINUED | OUTPATIENT
Start: 2023-02-22 | End: 2023-02-24

## 2023-02-22 RX ADMIN — FAMOTIDINE 20 MILLIGRAM(S): 10 INJECTION INTRAVENOUS at 17:24

## 2023-02-22 RX ADMIN — CYCLOBENZAPRINE HYDROCHLORIDE 5 MILLIGRAM(S): 10 TABLET, FILM COATED ORAL at 17:24

## 2023-02-22 RX ADMIN — FAMOTIDINE 20 MILLIGRAM(S): 10 INJECTION INTRAVENOUS at 05:50

## 2023-02-22 RX ADMIN — Medication 1 APPLICATION(S): at 14:57

## 2023-02-22 RX ADMIN — Medication 400 MILLIGRAM(S): at 06:20

## 2023-02-22 RX ADMIN — CYCLOBENZAPRINE HYDROCHLORIDE 5 MILLIGRAM(S): 10 TABLET, FILM COATED ORAL at 05:50

## 2023-02-22 RX ADMIN — Medication 400 MILLIGRAM(S): at 00:00

## 2023-02-22 RX ADMIN — Medication 400 MILLIGRAM(S): at 21:44

## 2023-02-22 RX ADMIN — LATANOPROST 1 DROP(S): 0.05 SOLUTION/ DROPS OPHTHALMIC; TOPICAL at 21:44

## 2023-02-22 RX ADMIN — ENOXAPARIN SODIUM 40 MILLIGRAM(S): 100 INJECTION SUBCUTANEOUS at 14:57

## 2023-02-22 RX ADMIN — Medication 40 MILLIEQUIVALENT(S): at 14:57

## 2023-02-22 RX ADMIN — Medication 400 MILLIGRAM(S): at 05:50

## 2023-02-22 RX ADMIN — Medication 1 APPLICATION(S): at 17:24

## 2023-02-22 RX ADMIN — Medication 400 MILLIGRAM(S): at 22:44

## 2023-02-22 RX ADMIN — Medication 400 MILLIGRAM(S): at 14:56

## 2023-02-22 RX ADMIN — Medication 400 MILLIGRAM(S): at 15:56

## 2023-02-22 RX ADMIN — AMLODIPINE BESYLATE 5 MILLIGRAM(S): 2.5 TABLET ORAL at 05:50

## 2023-02-22 NOTE — PATIENT PROFILE ADULT - FALL HARM RISK - HARM RISK INTERVENTIONS

## 2023-02-22 NOTE — PROGRESS NOTE ADULT - TIME BILLING
Reviewing chart notes and data, face to face time counseling the patient and HCP on plan of care and discharge options, communicating with orthopedics team for consultation, coordinating care with PT and SW/CM at [facility]

## 2023-02-22 NOTE — ADVANCED PRACTICE NURSE CONSULT - RECOMMEDATIONS
Suggest twice daily Normal Saline Cleanse of bilateral lower extremities, pat thoroughly dry.   Apply Mupirocin ointment twice daily (as ordered by prior Dermatologist) to open wounds/scabs.   Cover any open wounds/scabs with dry gauze, wrap with zhang.  Avoid any tape/adhesives to skin.  Suggest use Hypoallergenic sheets.  Nutritional support per Dietician's recommendations.   Consider Infectious Disease consult, as there is slight concern for cellulitic infection. Suggest twice daily Normal Saline Cleanse of bilateral lower extremities, pat thoroughly dry.   Apply Mupirocin ointment twice daily (as ordered by prior Dermatologist) to open wounds/scabs.   Cover any open wounds/scabs with dry gauze, wrap with zhang.  Avoid any tape/adhesives to skin.  Suggest use Hypoallergenic sheets.  Nutritional support per Dietician's recommendations.   Consider Infectious Disease consult, as there is slight concern for cellulitic infection.  Follow up out patient with Dermatology post discharge.

## 2023-02-22 NOTE — PROGRESS NOTE ADULT - ASSESSMENT
89 year old woman with hypertension, glaucoma and arthritis presented with worsened low back pain and difficulty ambulating for the past 2-3 days, admitted with bilateral sacral germania insufficiency fractures.    CT head neg for acute hemorrhage, extra-axial collection or displaced calvarial fracture.  Cervical spine CT showed no acute fracture or traumatic subluxation.   Lumbar spine CT showed bilateral sacral germania insufficiency fractures. Multilevel degenerative changes of the lumbar spine.    Acute lower back pain, worsened, with impaired mobility (due to pain)  Bilateral sacral germania insufficiency fractures, s/p fall at home  - Seen by ortho outpatient for initial fall ~2 weeks ago, now with sacral fx s/p 2nd fall 3 days prior  - Analgesics, muscle relaxants prn  - PT eval  - Ortho consult  - Social work referral as patient lives alone    Mild hypokalemia  - Replete/repeat    Mild hyponatremia  Mild HELDER, mild dehydration  - Improved with IVF  - Monitor BMP    HTN, glaucoma, stable  - Cont current meds: Amlodipine, Latanoprost eye drops    Dry skin, w/ scabs  - Resumed home regimen of topical Triamcinolone BID to left leg (was Rx'd both triamcinolone and betamethasone, along with instructions to cover with saran wrap at night)  -  NP evaluation requested for regimen recommendations    DVT prophylaxis  - Lovenox    Dispo: Patient interested in [facility]    Updated 2nd HCP/primary contact for this hospitalization at bedside, Georgette Fabian, [phone].  89 year old woman with hypertension, glaucoma and arthritis presented with worsened low back pain and difficulty ambulating for the past 2-3 days, admitted with bilateral sacral germania insufficiency fractures.    Acute lower back pain, worsened, with impaired mobility (due to pain)  Bilateral sacral germania insufficiency fractures, s/p fall at home  - Seen by ortho outpatient for initial fall ~2 weeks ago, now with sacral fx s/p 2nd fall 3 days prior  - Analgesics, muscle relaxants prn  - PT eval  - Ortho consult  - Social work referral as patient lives alone    Mild hypokalemia  - Replete/repeat    Mild hyponatremia  Mild HELDER, mild dehydration  - Improved with IVF  - Monitor BMP    HTN, glaucoma, stable  - Cont current meds: Amlodipine, Latanoprost eye drops    Dry skin, w/ scabs  - Resumed home regimen of topical Triamcinolone BID to left leg (was Rx'd both triamcinolone and betamethasone, along with instructions to cover with saran wrap at night)  -  NP evaluation requested for regimen recommendations    DVT prophylaxis  - Lovenox    Dispo: Patient interested in [facility]    Updated 2nd HCP/primary contact for this hospitalization at bedside, Georgette Fabian, [phone].  89 year old woman with hypertension, glaucoma and arthritis presented with worsened low back pain and difficulty ambulating for the past 2-3 days, admitted with bilateral sacral germania insufficiency fractures.    Acute lower back pain, worsened, with impaired mobility (due to pain)  Bilateral sacral germania insufficiency fractures, s/p fall at home  - Seen by ortho outpatient for initial fall ~2 weeks ago, now with sacral fx s/p 2nd fall 3 days prior  - Analgesics, muscle relaxants prn  - PT eval  - Ortho consult - recommending CT and bedrest/NWB pending results  - Social work referral as patient lives alone    Mild hypokalemia  - Replete/repeat    Mild hyponatremia  Mild HELDER, mild dehydration  - Improved with IVF  - Monitor BMP    HTN, glaucoma, stable  - Cont current meds: Amlodipine, Latanoprost eye drops    Dry skin, w/ scabs  - Resumed home regimen of topical Triamcinolone BID to left leg (was Rx'd both triamcinolone and betamethasone, along with instructions to cover with saran wrap at night)  -  NP evaluation requested for regimen recommendations    DVT prophylaxis  - Lovenox    Dispo: Patient interested in [facility]    Updated 2nd HCP/primary contact for this hospitalization at bedside, Georgette Fabian, [phone].

## 2023-02-22 NOTE — PROGRESS NOTE ADULT - SUBJECTIVE AND OBJECTIVE BOX
Patient is a 89y old  Female who presents with a chief complaint of sacral fracture- worsened low back pain, diff ambulating (2023 20:12)    Patient seen and examined at bedside. No acute overnight events. Feels pain is controlled with her regimen. Had more pain after being turned for cleaning this morning, otherwise has no complaints including CP, SOB, abdominal pain, N/V.    ALLERGIES:  No Known Allergies    MEDICATIONS  (STANDING):  amLODIPine   Tablet 5 milliGRAM(s) Oral daily  cyclobenzaprine 5 milliGRAM(s) Oral every 12 hours  famotidine    Tablet 20 milliGRAM(s) Oral two times a day  ibuprofen  Tablet. 400 milliGRAM(s) Oral three times a day  latanoprost 0.005% Ophthalmic Solution 1 Drop(s) Both EYES every 24 hours  vitamin A &amp; D Ointment 1 Application(s) Topical daily    MEDICATIONS  (PRN):  acetaminophen     Tablet .. 650 milliGRAM(s) Oral every 6 hours PRN Temp greater or equal to 38C (100.4F), Mild Pain (1 - 3)  oxycodone    5 mG/acetaminophen 325 mG 1 Tablet(s) Oral every 4 hours PRN Severe Pain (7 - 10)    Vital Signs Last 24 Hrs  T(F): 97.6 (2023 11:54), Max: 98.4 (2023 16:54)  HR: 68 (2023 11:54) (66 - 81)  BP: 115/70 (2023 11:54) (115/70 - 152/79)  RR: 18 (2023 11:54) (17 - 18)  SpO2: 97% (2023 11:54) (96% - 98%)    I&O's Summary    2023 07:01  -  2023 07:00  --------------------------------------------------------  IN: 0 mL / OUT: 400 mL / NET: -400 mL      BMI (kg/m2): 25.9 (23 @ 16:54)    PHYSICAL EXAM:  General: NAD, lying in bed  Eyes: Anicteric  ENT: Moist mucous membranes  Neck: Supple, No JVD  Lungs: Clear to auscultation bilaterally, normal respiratory effort  Cardio: RRR, S1/S2, No murmurs  Abdomen: Soft, Nontender, Nondistended; Bowel sounds present  Extremities: No calf tenderness, No pitting edema, no digital cyanosis  Skin: Warm, dry, left lower leg with healing scabs, faint erythema but no warmth, drainage or TTP  Psych: A&O x 3, follows commands    LABS:                        11.5   9.73  )-----------( 421      ( 2023 17:50 )             36.0       -    135  |  101  |  28  ----------------------------<  76  3.4   |  29  |  0.71    Ca    8.5      2023 06:30  Mg     1.8         TPro  8.2  /  Alb  2.7  /  TBili  0.2  /  DBili  x   /  AST  30  /  ALT  33  /  AlkPhos  146  -            CARDIAC MARKERS ( 2023 17:50 )  x     / x     / 112 U/L / x     / x            Urinalysis Basic - ( 2023 23:20 )  Color: Yellow / Appearance: Clear / S.010 / pH: x  Gluc: x / Ketone: Negative  / Bili: Negative / Urobili: Negative   Blood: x / Protein: 15 / Nitrite: Negative   Leuk Esterase: Negative / RBC: 0-4 /HPF / WBC 0-2 /HPF   Sq Epi: x / Non Sq Epi: Neg.-Few / Bacteria: Negative /HPF        COVID-19 PCR: NotDetec (23 @ 21:40)      RADIOLOGY & ADDITIONAL TESTS:   Personally reviewed.     Consultant(s) Notes Reviewed:  [x] YES  [ ] NO    Care Discussed with Consultants/Other Providers: surgical PA Patient is a 89y old  Female who presents with a chief complaint of sacral fracture- worsened low back pain, diff ambulating (2023 20:12)    Patient seen and examined at bedside. No acute overnight events. Feels pain is controlled with her regimen. Had more pain after being turned for cleaning this morning, otherwise has no complaints including CP, SOB, abdominal pain, N/V.    ALLERGIES:  No Known Allergies    MEDICATIONS  (STANDING):  amLODIPine   Tablet 5 milliGRAM(s) Oral daily  cyclobenzaprine 5 milliGRAM(s) Oral every 12 hours  famotidine    Tablet 20 milliGRAM(s) Oral two times a day  ibuprofen  Tablet. 400 milliGRAM(s) Oral three times a day  latanoprost 0.005% Ophthalmic Solution 1 Drop(s) Both EYES every 24 hours  vitamin A &amp; D Ointment 1 Application(s) Topical daily    MEDICATIONS  (PRN):  acetaminophen     Tablet .. 650 milliGRAM(s) Oral every 6 hours PRN Temp greater or equal to 38C (100.4F), Mild Pain (1 - 3)  oxycodone    5 mG/acetaminophen 325 mG 1 Tablet(s) Oral every 4 hours PRN Severe Pain (7 - 10)    Vital Signs Last 24 Hrs  T(F): 97.6 (2023 11:54), Max: 98.4 (2023 16:54)  HR: 68 (2023 11:54) (66 - 81)  BP: 115/70 (2023 11:54) (115/70 - 152/79)  RR: 18 (2023 11:54) (17 - 18)  SpO2: 97% (2023 11:54) (96% - 98%)    I&O's Summary    2023 07:01  -  2023 07:00  --------------------------------------------------------  IN: 0 mL / OUT: 400 mL / NET: -400 mL      BMI (kg/m2): 25.9 (23 @ 16:54)    PHYSICAL EXAM:  General: NAD, lying in bed  Eyes: Anicteric  ENT: Moist mucous membranes  Neck: Supple, No JVD  Lungs: Clear to auscultation bilaterally, normal respiratory effort  Cardio: RRR, S1/S2, No murmurs  Abdomen: Soft, Nontender, Nondistended; Bowel sounds present  Extremities: No calf tenderness, No pitting edema, no digital cyanosis  Skin: Warm, dry, left lower leg with healing scabs, faint erythema but no warmth, drainage or TTP  Psych: A&O x 3, follows commands    LABS:                        11.5   9.73  )-----------( 421      ( 2023 17:50 )             36.0       -    135  |  101  |  28  ----------------------------<  76  3.4   |  29  |  0.71    Ca    8.5      2023 06:30  Mg     1.8         TPro  8.2  /  Alb  2.7  /  TBili  0.2  /  DBili  x   /  AST  30  /  ALT  33  /  AlkPhos  146  -            CARDIAC MARKERS ( 2023 17:50 )  x     / x     / 112 U/L / x     / x            Urinalysis Basic - ( 2023 23:20 )  Color: Yellow / Appearance: Clear / S.010 / pH: x  Gluc: x / Ketone: Negative  / Bili: Negative / Urobili: Negative   Blood: x / Protein: 15 / Nitrite: Negative   Leuk Esterase: Negative / RBC: 0-4 /HPF / WBC 0-2 /HPF   Sq Epi: x / Non Sq Epi: Neg.-Few / Bacteria: Negative /HPF        COVID-19 PCR: NotDetec (23 @ 21:40)      RADIOLOGY & ADDITIONAL TESTS:   Personally reviewed.   CT head neg for acute hemorrhage, extra-axial collection or displaced calvarial fracture.  Cervical spine CT showed no acute fracture or traumatic subluxation.   Lumbar spine CT showed bilateral sacral germania insufficiency fractures. Multilevel degenerative changes of the lumbar spine.    Consultant(s) Notes Reviewed:  [x] YES  [ ] NO    Care Discussed with Consultants/Other Providers: surgical PA, PT

## 2023-02-22 NOTE — ADVANCED PRACTICE NURSE CONSULT - ASSESSMENT
Patient admitted to [facility] for sacral fracture, presents with Left Lower Extremity wounds.  Was previously treated outpatient by dermatologist, although may not have followed recommendations.  Patient A&Ox4, states she was told condition was "from fragrances".  Patient has erythema & edema (?warmth) to Left calf, extending inferiorly to level of ankle, & superiorly to mid calf.  There are many diffuse small scabs/crusting areas.  Right lower extremity noted to have slight erythema & edema, much less than LLE.

## 2023-02-23 LAB
ANION GAP SERPL CALC-SCNC: 6 MMOL/L — SIGNIFICANT CHANGE UP (ref 5–17)
BUN SERPL-MCNC: 38 MG/DL — HIGH (ref 7–23)
CALCIUM SERPL-MCNC: 9.2 MG/DL — SIGNIFICANT CHANGE UP (ref 8.4–10.5)
CHLORIDE SERPL-SCNC: 106 MMOL/L — SIGNIFICANT CHANGE UP (ref 96–108)
CO2 SERPL-SCNC: 28 MMOL/L — SIGNIFICANT CHANGE UP (ref 22–31)
CREAT SERPL-MCNC: 0.92 MG/DL — SIGNIFICANT CHANGE UP (ref 0.5–1.3)
EGFR: 58 ML/MIN/1.73M2 — LOW
GLUCOSE SERPL-MCNC: 85 MG/DL — SIGNIFICANT CHANGE UP (ref 70–99)
POTASSIUM SERPL-MCNC: 4.4 MMOL/L — SIGNIFICANT CHANGE UP (ref 3.5–5.3)
POTASSIUM SERPL-SCNC: 4.4 MMOL/L — SIGNIFICANT CHANGE UP (ref 3.5–5.3)
SODIUM SERPL-SCNC: 140 MMOL/L — SIGNIFICANT CHANGE UP (ref 135–145)

## 2023-02-23 PROCEDURE — 99223 1ST HOSP IP/OBS HIGH 75: CPT | Mod: FS

## 2023-02-23 PROCEDURE — 99233 SBSQ HOSP IP/OBS HIGH 50: CPT

## 2023-02-23 RX ORDER — ACETAMINOPHEN 500 MG
650 TABLET ORAL ONCE
Refills: 0 | Status: COMPLETED | OUTPATIENT
Start: 2023-02-23 | End: 2023-02-23

## 2023-02-23 RX ORDER — MUPIROCIN 20 MG/G
1 OINTMENT TOPICAL
Refills: 0 | Status: DISCONTINUED | OUTPATIENT
Start: 2023-02-23 | End: 2023-02-24

## 2023-02-23 RX ORDER — OXYCODONE HYDROCHLORIDE 5 MG/1
5 TABLET ORAL ONCE
Refills: 0 | Status: DISCONTINUED | OUTPATIENT
Start: 2023-02-23 | End: 2023-02-23

## 2023-02-23 RX ORDER — CEPHALEXIN 500 MG
500 CAPSULE ORAL EVERY 12 HOURS
Refills: 0 | Status: DISCONTINUED | OUTPATIENT
Start: 2023-02-23 | End: 2023-02-24

## 2023-02-23 RX ADMIN — OXYCODONE HYDROCHLORIDE 5 MILLIGRAM(S): 5 TABLET ORAL at 11:44

## 2023-02-23 RX ADMIN — MUPIROCIN 1 APPLICATION(S): 20 OINTMENT TOPICAL at 17:22

## 2023-02-23 RX ADMIN — Medication 1 TABLET(S): at 14:53

## 2023-02-23 RX ADMIN — Medication 1 APPLICATION(S): at 11:28

## 2023-02-23 RX ADMIN — Medication 1 TABLET(S): at 14:52

## 2023-02-23 RX ADMIN — LATANOPROST 1 DROP(S): 0.05 SOLUTION/ DROPS OPHTHALMIC; TOPICAL at 21:31

## 2023-02-23 RX ADMIN — Medication 650 MILLIGRAM(S): at 11:44

## 2023-02-23 RX ADMIN — ENOXAPARIN SODIUM 40 MILLIGRAM(S): 100 INJECTION SUBCUTANEOUS at 14:53

## 2023-02-23 RX ADMIN — AMLODIPINE BESYLATE 5 MILLIGRAM(S): 2.5 TABLET ORAL at 05:52

## 2023-02-23 RX ADMIN — Medication 1 APPLICATION(S): at 21:31

## 2023-02-23 RX ADMIN — FAMOTIDINE 20 MILLIGRAM(S): 10 INJECTION INTRAVENOUS at 05:52

## 2023-02-23 RX ADMIN — OXYCODONE HYDROCHLORIDE 5 MILLIGRAM(S): 5 TABLET ORAL at 10:44

## 2023-02-23 RX ADMIN — Medication 1 APPLICATION(S): at 05:52

## 2023-02-23 RX ADMIN — Medication 1 APPLICATION(S): at 17:19

## 2023-02-23 RX ADMIN — Medication 500 MILLIGRAM(S): at 19:01

## 2023-02-23 RX ADMIN — Medication 650 MILLIGRAM(S): at 10:44

## 2023-02-23 RX ADMIN — CYCLOBENZAPRINE HYDROCHLORIDE 5 MILLIGRAM(S): 10 TABLET, FILM COATED ORAL at 05:51

## 2023-02-23 NOTE — DIETITIAN INITIAL EVALUATION ADULT - PERTINENT MEDS FT
MEDICATIONS  (STANDING):  amLODIPine   Tablet 5 milliGRAM(s) Oral daily  enoxaparin Injectable 40 milliGRAM(s) SubCutaneous every 24 hours  latanoprost 0.005% Ophthalmic Solution 1 Drop(s) Both EYES every 24 hours  mupirocin 2% Ointment 1 Application(s) Topical two times a day  triamcinolone 0.1% Ointment 1 Application(s) Topical every 12 hours  vitamin A &amp; D Ointment 1 Application(s) Topical daily    MEDICATIONS  (PRN):  acetaminophen     Tablet .. 650 milliGRAM(s) Oral every 6 hours PRN Temp greater or equal to 38C (100.4F), Mild Pain (1 - 3)  ibuprofen  Tablet. 400 milliGRAM(s) Oral three times a day PRN Moderate Pain (4 - 6)  oxycodone    5 mG/acetaminophen 325 mG 1 Tablet(s) Oral every 4 hours PRN Severe Pain (7 - 10)

## 2023-02-23 NOTE — PROGRESS NOTE ADULT - SUBJECTIVE AND OBJECTIVE BOX
Patient is a 89y old  Female who presents with a chief complaint of sacral fracture- worsened low back pain, diff ambulating (2023 15:00)    Patient seen and examined at bedside. No acute overnight events.     ALLERGIES:  No Known Allergies    MEDICATIONS  (STANDING):  amLODIPine   Tablet 5 milliGRAM(s) Oral daily  calcium carbonate 1250 mG  + Vitamin D (OsCal 500 + D) 1 Tablet(s) Oral daily  enoxaparin Injectable 40 milliGRAM(s) SubCutaneous every 24 hours  latanoprost 0.005% Ophthalmic Solution 1 Drop(s) Both EYES every 24 hours  multivitamin 1 Tablet(s) Oral daily  mupirocin 2% Ointment 1 Application(s) Topical two times a day  triamcinolone 0.1% Ointment 1 Application(s) Topical every 12 hours  vitamin A &amp; D Ointment 1 Application(s) Topical daily    MEDICATIONS  (PRN):  acetaminophen     Tablet .. 650 milliGRAM(s) Oral every 6 hours PRN Temp greater or equal to 38C (100.4F), Mild Pain (1 - 3)  ibuprofen  Tablet. 400 milliGRAM(s) Oral three times a day PRN Moderate Pain (4 - 6)  oxycodone    5 mG/acetaminophen 325 mG 1 Tablet(s) Oral every 4 hours PRN Severe Pain (7 - 10)    Vital Signs Last 24 Hrs  T(F): 97.6 (2023 05:35), Max: 98.3 (2023 19:47)  HR: 75 (2023 10:42) (70 - 82)  BP: 126/68 (2023 10:42) (121/68 - 129/69)  RR: 18 (2023 05:35) (18 - 18)  SpO2: 94% (2023 09:30) (94% - 95%)    I&O's Summary    2023 07:01  -  2023 07:00  --------------------------------------------------------  IN: 600 mL / OUT: 400 mL / NET: 200 mL      BMI (kg/m2): 25.9 (23 @ 16:54)    PHYSICAL EXAM:  General: NAD, lying in bed  Eyes: Anicteric  ENT: Moist mucous membranes  Neck: Supple, No JVD  Lungs: Clear to auscultation bilaterally, normal respiratory effort  Cardio: RRR, S1/S2, No murmurs  Abdomen: Soft, Nontender, Nondistended; Bowel sounds present  Extremities: No calf tenderness, No pitting edema, no digital cyanosis  Skin: Warm, dry, left lower leg with healing scabs, diffuse erythema and slightly warm to palpation, no drainage or TTP  Psych: A&O x 3, follows commands      LABS:                        11.5   9.73  )-----------( 421      ( 2023 17:50 )             36.0           140  |  106  |  38  ----------------------------<  85  4.4   |  28  |  0.92    Ca    9.2      2023 06:30  Mg     1.8         TPro  8.2  /  Alb  2.7  /  TBili  0.2  /  DBili  x   /  AST  30  /  ALT  33  /  AlkPhos  146  -            CARDIAC MARKERS ( 2023 17:50 )  x     / x     / 112 U/L / x     / x            Urinalysis Basic - ( 2023 23:20 )    Color: Yellow / Appearance: Clear / S.010 / pH: x  Gluc: x / Ketone: Negative  / Bili: Negative / Urobili: Negative   Blood: x / Protein: 15 / Nitrite: Negative   Leuk Esterase: Negative / RBC: 0-4 /HPF / WBC 0-2 /HPF   Sq Epi: x / Non Sq Epi: Neg.-Few / Bacteria: Negative /HPF        COVID-19 PCR: NotDetec (23 @ 21:40)      RADIOLOGY & ADDITIONAL TESTS:   Personally reviewed.     Consultant(s) Notes Reviewed:  [x] YES  [ ] NO    Care Discussed with Consultants/Other Providers: ortho PA

## 2023-02-23 NOTE — CONSULT NOTE ADULT - REASON FOR ADMISSION
sacral fracture- worsened low back pain, diff ambulating
sacral fracture- worsened low back pain, diff ambulating

## 2023-02-23 NOTE — CONSULT NOTE ADULT - SUBJECTIVE AND OBJECTIVE BOX
89-year-old woman with hypertension, glaucoma, arthritis, presents to the ED, c/o worsened low back pain and difficulty ambulating for the past 2-3 days.  Pt lives alone and at baseline, walks independently, w/no assistive devices.  Pt states she fell on her buttocks at home, about 2 weeks ago, and she did see ortho.  Pt states the pain did improve after a week, also take s Tylenol prn,  However, she fell again about 3 days ago.  Pt was okay. until for the past 2 days, low back pain has worsened, even with minimal movement.  Pt also reports pain not relieved with Tylenol. pt started taking Advil, but last time was yesterday.  Today, pt with difficulty standing and walking around due to the low back pain.  She deniesnumbness, tingling, she is able to move all her extremities.  She denies fever, chills, chest pain, headache, dizziness, cough, dyspnea, dysuria.  She had nml BM and urination.   Ct head neg for acute hemorrhage, extra-axial collection or displaced calvarial fracture.  Cervical spine CT showed no acute fracture or traumatic subluxation. Lumbar spine CT showed  bilateral sacral germania insufficiency fractures. Multilevel degenerative changes of the lumbar spine. (21 Feb 2023 20:12)    Patient was seen and examined by me today.   Patient is in bed and appears comfortable if not moving.   She states that after her second fall , she was not able to walk and c/o excruciating pain in the buttock area.    She is able to move her legs.  She has sensation , denies any incontinence of urine or stool.       Vital Signs Last 24 Hrs  T(C): 36.4 (22 Feb 2023 11:54), Max: 36.9 (21 Feb 2023 16:54)  T(F): 97.6 (22 Feb 2023 11:54), Max: 98.4 (21 Feb 2023 16:54)  HR: 68 (22 Feb 2023 11:54) (66 - 81)  BP: 115/70 (22 Feb 2023 11:54) (115/70 - 152/79)  BP(mean): 102 (21 Feb 2023 23:31) (102 - 108)  RR: 18 (22 Feb 2023 11:54) (17 - 18)  SpO2: 97% (22 Feb 2023 11:54) (96% - 98%)    Parameters below as of 22 Feb 2023 05:34  Patient On (Oxygen Delivery Method): room air    PAST MEDICAL & SURGICAL HISTORY:  Hypertension  History of total knee replacement  right  History of hip arthroplasty right hip with pinning   History of hip replacement, total, left    MEDICATIONS  (STANDING):  amLODIPine   Tablet 5 milliGRAM(s) Oral daily  cyclobenzaprine 5 milliGRAM(s) Oral every 12 hours  enoxaparin Injectable 40 milliGRAM(s) SubCutaneous every 24 hours  famotidine    Tablet 20 milliGRAM(s) Oral two times a day  ibuprofen  Tablet. 400 milliGRAM(s) Oral three times a day  latanoprost 0.005% Ophthalmic Solution 1 Drop(s) Both EYES every 24 hours  potassium chloride   Solution 40 milliEquivalent(s) Oral once  triamcinolone 0.1% Ointment 1 Application(s) Topical every 12 hours  vitamin A &amp; D Ointment 1 Application(s) Topical daily    MEDICATIONS  (PRN):  acetaminophen     Tablet .. 650 milliGRAM(s) Oral every 6 hours PRN Temp greater or equal to 38C (100.4F), Mild Pain (1 - 3)  oxycodone    5 mG/acetaminophen 325 mG 1 Tablet(s) Oral every 4 hours PRN Severe Pain (7 - 10)    PE:  Alert and oriented X 3   Lungs:  CTA   Cor:  S1S2   Abd:  soft, non tender, +BS  Back:  Tenderness along spine and buttock area Left > Right   Right and Left legs able to flex and forward extend with minimal discomfort.    + neurovascular intact .    Bilateral dermatitis lower legs                            11.5   9.73  )-----------( 421      ( 21 Feb 2023 17:50 )             36.0   02-22    135  |  101  |  28<H>  ----------------------------<  76  3.4<L>   |  29  |  0.71    Ca    8.5      22 Feb 2023 06:30  Mg     1.8     02-22    TPro  8.2  /  Alb  2.7<L>  /  TBili  0.2  /  DBili  x   /  AST  30  /  ALT  33  /  AlkPhos  146<H>  02-21    Radiology CT of lumbar spine     IMPRESSION:    Brain CT: No acute hemorrhage, extra-axial collection or displaced   calvarial fracture.    Cervical spine CT: No acute fracture or traumatic subluxation.    Lumbar spine CT: Bilateral sacral germania insufficiency fractures.   Multilevel degenerative changes of the lumbar spine.    The results of this examination were discussed with JUDAH Dsouza at 7:01   PM on 2/21/2023    --- End of Report ---      < from: Xray Pelvis AP only (02.21.23 @ 18:43) >      < end of copied text >      < from: Xray Pelvis AP only (02.21.23 @ 18:43) >    ACC: [id number] EXAM:  XR PELVIS AP ONLY 1-2 VIEWS   ORDERED BY:  BANDAR ARNOLD     ACC: [id number] EXAM:  XR CHEST PORTABLE URGENT 1V   ORDERED BY:  BANDAR ARNOLD     PROCEDURE DATE:  02/21/2023          INTERPRETATION:  INDICATION: Status post fall    Portable chest 6:20 PM    COMPARISON: None    FINDINGS:  Heart/Vascular: The heart size, mediastinum, hilum and aorta are within   normal limits for projection.  Pulmonary: Midline trachea. There is no focal infiltrate, congestion or   effusion.  Bones: There is no fracture. Advanced degenerative change right   glenohumeral joint. Osteopenia.  Lines and catheter: None    AP pelvis compared with 2/6/2023    Limited by overlying stool and bowel gas. There is no obvious fracture or   bonylesion. No dislocation. Status post total left hip replacement.   Visualized hardware intact. Status post ORIF right hip. Visualized   hardware intact. Again seen is a screw perpendicular to the femoral plate   within the subcutaneous tissue. Correlate clinically.    Impression:    No acute pulmonary disease.    Limited evaluation of the pelvis does not demonstrate any obvious   fracture or bony lesion. If symptoms persist recommen    < end of copied text >  
Patient is a 89y old  Female who presents with a chief complaint of Unspecified fracture of sacrum, initial encounter for closed fracture    HPI:  This is a 89-year-old woman with hypertension, glaucoma, arthritis, presents to [facility] ED on  with c/o worsened low back pain and difficulty ambulating for the past 2-3 days.  Pt lives alone and at baseline, walks independently, with no assistive devices.  Pt states she fell on her buttocks at home, about 2 weeks prior and she did see ortho.  Pt stated the pain did improve after a week, also take Tylenol prn.  However, she fell again about 3 days prior to presentataion.  Pt was okay. until for the past 2 days, low back pain has worsened, even with minimal movement.  Pt also reports pain not relieved with Tylenol. pt started taking Advil, but last time was yesterday.  She has difficulty standing and walking around due to the low back pain. She denied numbness, tingling, she is able to move all her extremities.  She denies fever, chills, chest pain, headache, dizziness, cough, dyspnea, dysuria.  She had nml BM and urination.   CT head neg for acute hemorrhage, extra-axial collection or displaced calvarial fracture.  Cervical spine CT showed no acute fracture or traumatic subluxation. Lumbar spine CT showed bilateral sacral germania insufficiency fractures. Multilevel degenerative changes of the lumbar spine. She was seen by ortho for acute, nondisplaced, minimally impacted sacral fracture, no surgical intervention, WBAT.       Subjective  The patient was seen in room, she c/o pain to lower back and buttock with movement. she denies ha, cp, cough, fever or chills.         REVIEW OF SYSTEMS  Constitutional: No fever, No Chills, No fatigue  HEENT: No eye pain, No visual disturbances, No difficulty hearing  Pulm: No cough,  No shortness of breath  Cardio: No chest pain, No palpitations  GI:  No abdominal pain, No nausea, No vomiting, No diarrhea, + constipation  : No dysuria, No frequency, No hematuria  Neuro: No headaches, No memory loss, + loss of strength, no numbness, No tremors  Skin: No itching, No rashes, No lesions   Endo: No temperature intolerance  MSK: + joint pain, No joint swelling, + muscle pain, No Neck or back pain  Psych:  No depression, No anxiety      PAST MEDICAL & SURGICAL HISTORY  No pertinent past medical history    Hypertension    History of total knee replacement    History of total hip arthroplasty    History of hip replacement, total, left    FAMILY HISTORY   No pertinent family history in first degree relatives    SOCIAL HISTORY  Smoking - denies  EtOH - denies  Drugs - denies    FUNCTIONAL HISTORY:   Patient lives alone in PH 2 [address] and 1 FOS to 2nd floor. she stays on 1st floor.  PTA: Independent in ADLs and ambulation. Patient  has been using RW since fall. She recently hired someone to help her at home a couple of days a week    CURRENT FUNCTIONAL STATUS  Date:   Bed Mobility: mod a  Transfers: mod a, 2 person  Gait:   Upper Body Dressing:  Lower Body Dressing:  Toileting:  Bathing:    RECENT LABS/IMAGING  CBC Full  -  ( 2023 17:50 )  WBC Count : 9.73 K/uL  RBC Count : 4.12 M/uL  Hemoglobin : 11.5 g/dL  Hematocrit : 36.0 %  Platelet Count - Automated : 421 K/uL  Mean Cell Volume : 87.4 fl  Mean Cell Hemoglobin : 27.9 pg  Mean Cell Hemoglobin Concentration : 31.9 gm/dL  Auto Neutrophil # : x  Auto Lymphocyte # : x  Auto Monocyte # : x  Auto Eosinophil # : x  Auto Basophil # : x  Auto Neutrophil % : x  Auto Lymphocyte % : x  Auto Monocyte % : x  Auto Eosinophil % : x  Auto Basophil % : x        140  |  106  |  38<H>  ----------------------------<  85  4.4   |  28  |  0.92    Ca    9.2      2023 06:30  Mg     1.8         TPro  8.2  /  Alb  2.7<L>  /  TBili  0.2  /  DBili  x   /  AST  30  /  ALT  33  /  AlkPhos  146<H>      Urinalysis Basic - ( 2023 23:20 )    Color: Yellow / Appearance: Clear / S.010 / pH: x  Gluc: x / Ketone: Negative  / Bili: Negative / Urobili: Negative   Blood: x / Protein: 15 / Nitrite: Negative   Leuk Esterase: Negative / RBC: 0-4 /HPF / WBC 0-2 /HPF   Sq Epi: x / Non Sq Epi: Neg.-Few / Bacteria: Negative /HPF        VITALS  T(C): 36.4 (23 @ 05:35), Max: 36.8 (23 @ 19:47)  HR: 75 (23 @ 10:42) (70 - 82)  BP: 126/68 (23 @ 10:42) (121/68 - 129/69)  RR: 18 (23 @ 05:35) (18 - 18)  SpO2: 94% (23 @ 09:30) (94% - 95%)  Wt(kg): --    ALLERGIES  No Known Allergies      MEDICATIONS   acetaminophen     Tablet .. 650 milliGRAM(s) Oral every 6 hours PRN  amLODIPine   Tablet 5 milliGRAM(s) Oral daily  calcium carbonate 1250 mG  + Vitamin D (OsCal 500 + D) 1 Tablet(s) Oral daily  enoxaparin Injectable 40 milliGRAM(s) SubCutaneous every 24 hours  ibuprofen  Tablet. 400 milliGRAM(s) Oral three times a day PRN  latanoprost 0.005% Ophthalmic Solution 1 Drop(s) Both EYES every 24 hours  multivitamin 1 Tablet(s) Oral daily  mupirocin 2% Ointment 1 Application(s) Topical two times a day  oxycodone    5 mG/acetaminophen 325 mG 1 Tablet(s) Oral every 4 hours PRN  triamcinolone 0.1% Ointment 1 Application(s) Topical every 12 hours  vitamin A &amp; D Ointment 1 Application(s) Topical daily      ----------------------------------------------------------------------------------------  PHYSICAL EXAM  Gen - NAD, Comfortable  HEENT - NCAT, EOMI, MMM  Neck - Supple, No limited ROM  Pulm - CTAB, No wheeze, No rhonchi, No crackles  Cardiovascular - irregular, S1S2  Abdomen - Soft, NT/ND, +BS  Extremities - No clubbing, no cyanosis  Neuro-     Cognitive - Awake, Alert, Oriented  to self, place, date, year, situation. Able  to follow command     Communication - Fluent     Attention: Intact      Judgement: Good evidence of judgement     Memory: Recall 3 objects immediate and 3 min later         Cranial Nerves - CN 2-12 intact.     Motor -                     LEFT    UE - 4/5                    RIGHT UE - 4/5                    LEFT    LE - 4/5                    RIGHT LE - 4/5        Sensory - Intact to LT     Reflexes - DTR Intact     Tone - normal  Psychiatric - Mood stable, Affect WNL  Skin:  erythema and sore to left lower leg- wrapped with zhang

## 2023-02-23 NOTE — DIETITIAN INITIAL EVALUATION ADULT - ORAL INTAKE PTA/DIET HISTORY
Pt endorses good appetite. Lives alone, states that she was preparing & obtaining all meals for herself, doing her own grocery shopping. Diet recall: cold cereal with coffee, juice, lunch: sandwich with yogurt, dinner: protein (poultry/fish, meat) with vegetables & sweet potato. Drinks water or diet soda. NKFA. Reports taking Vitamin D PTA.

## 2023-02-23 NOTE — PROGRESS NOTE ADULT - ASSESSMENT
89 yo female admitted due to difficult/painful ambulation    pt was found to have acute, nondisplaced, minimally impacted sacral fracture    case d/w Dr. Corcoran, no acute surgical intervention warranted    plan  - WBAT  - DVT ppx  - PT c/s  - analgesia prn

## 2023-02-23 NOTE — DIETITIAN INITIAL EVALUATION ADULT - OTHER INFO
89 year old woman with hypertension, glaucoma and arthritis presented with worsened low back pain and difficulty ambulating for the past 2-3 days, admitted with bilateral sacral germania insufficiency fractures.  Pt seen with good intake at [facility] this morning. Consuming % of meals. Able to self feed. Reports UBW 170lbs, denies any recent weight changes. Pt reports last BM 2 days, receptive to prune juice TID. Encouraged increased oral hydration. Reviewed DASH/TLC diet. Recommend Calcium + Vitamin D supplementation + MVI.

## 2023-02-23 NOTE — PROGRESS NOTE ADULT - ASSESSMENT
89 year old woman with hypertension, glaucoma and arthritis presented with worsened low back pain and difficulty ambulating for the past 2-3 days, admitted with bilateral sacral germania insufficiency fractures.    Acute lower back pain, worsened, with impaired mobility (due to pain)  Bilateral sacral germania insufficiency fractures, s/p fall at home  - Seen by ortho outpatient for initial fall ~2 weeks ago, now with sacral fx s/p 2nd fall 3 days prior  - Analgesics, muscle relaxants prn  - PT/OT and PM&R evaluated, recommending acute rehab  - Discussed CT results with Ortho JUDAH Ac - WB as tolerated    Mild hypokalemia  - Normalized s/p repletion    Mild hyponatremia  Mild HELDER, mild dehydration  - Improved with IVF  - Monitor BMP    HTN, glaucoma, stable  - Cont current meds: Amlodipine, Latanoprost eye drops    Dry skin, w/ scabs  LLE cellulitis  - Resumed home regimen of topical Triamcinolone BID to left leg (was Rx'd both triamcinolone and betamethasone, along with instructions to cover with saran wrap at night), Mupirocin PRN for open wounds  - Short course of Keflex to cover for mild superimposed cellulitis of LLE  -  NP recs appreciated    DVT prophylaxis  - Lovenox    Dispo: Patient interested in [facility]    2/23: Updated 2nd HCP/primary contact for this hospitalization at bedside, Georgette Fabian, [phone].  89 year old woman with hypertension, glaucoma and arthritis presented with worsened low back pain and difficulty ambulating for the past 2-3 days, admitted with bilateral sacral germania insufficiency fractures.    Acute lower back pain, worsened, with impaired mobility (due to pain)  Bilateral sacral germania insufficiency fractures, s/p fall at home  - Seen by ortho outpatient for initial fall ~2 weeks ago, now with sacral fx s/p 2nd fall 3 days prior  - Analgesics, muscle relaxants prn  - PT/OT, PM&R evaluated, recommending acute rehab  - Discussed CT results with Ortho JUDAH Ac - WB as tolerated    Mild hypokalemia  - Normalized s/p repletion    Mild hyponatremia  Mild HELDER, mild dehydration  - Improved with IVF  - Monitor BMP    HTN, glaucoma, stable  - Cont current meds: Amlodipine, Latanoprost eye drops    Bilateral LE dry skin, w/ scabs  LLE cellulitis  - Home regimen per derm (patient was not compliant until recently): Rx'd both triamcinolone and betamethasone along with instructions to cover with saran wrap at night, Mupirocin PRN for open wounds  - Topical Triamcinolone BID to lower legs with Mupirocin PRN to open wounds  - Short course of Keflex to cover for mild superimposed cellulitis of LLE  - Hypoallergenic sheets  -  NP recs appreciated    DVT prophylaxis  - Lovenox    Dispo: Patient interested in inpatient rehab or [facility]    2/23: Updated 2nd HCP/primary contact for this hospitalization, Georgette Fabian, [phone].

## 2023-02-23 NOTE — PROGRESS NOTE ADULT - TIME BILLING
Reviewing chart notes and data, face to face time counseling the patient and HCP on plan of care and discharge options, communicating with orthopedics team for consultation, discussing with PT to determine type of rehab, coordinating care with PT and SW/CM at [facility] Reviewing chart notes and data, face to face time counseling the patient and HCP on plan of care and discharge options, assessing and adjusting acute pain regimen to facilitate therapy, communicating with orthopedics team for consultation, discussing with PT to determine type of rehab, coordinating care with PT and SW/CM at [facility]

## 2023-02-23 NOTE — DIETITIAN INITIAL EVALUATION ADULT - PERTINENT LABORATORY DATA
02-23    140  |  106  |  38<H>  ----------------------------<  85  4.4   |  28  |  0.92    Ca    9.2      23 Feb 2023 06:30  Mg     1.8     02-22    TPro  8.2  /  Alb  2.7<L>  /  TBili  0.2  /  DBili  x   /  AST  30  /  ALT  33  /  AlkPhos  146<H>  02-21

## 2023-02-23 NOTE — PHYSICAL THERAPY INITIAL EVALUATION ADULT - GAIT TRAINING, PT EVAL
"[facility] ACUTE PAIN SERVICE    ([facility], [facility], [facility])  Daily PAIN Progress Note    Assessment/Plan:  Jarrell Nicole is a 65 year old female who was admitted on 7/12/2021.   . I was asked to overdose of opiates and benzos in the setting of acute diverticulitis and RASHAWN. History of stage 4 breast cancer, HTN, HLD, mood disorder, borderline personality disorder, chronic pain syndrome, MOHINDER/agoraphobia. Pain is present \"all over\", diarrhea resolved, less anxious, pain moderately controlled.     PLAN:   1) Cancer pain in the setting of known past addiction and overdose. The patient's home MME was 108 mg daily. Would suggest suboxone outpatient. Working on discharge prescribing plan. Increase subutex to qid today.   2)Multimodal Medication Therapy  Topical: none, patient not interested in trying at this point  NSAID'S: RASHAWN improved, hold  Muscle Relaxants: none, does not describe any spasms.  Adjuvants: tylenol, gabapentin bid 300mg can resume today   Antidepressants/anxiolytics:Effexor  375mg qam  Opioids:  Subutex 2mg increase to QID and NO dilaudid   3)Non-medication interventions- acupuncture, ice, PT   4)Constipation Prophylaxis- none diarrhea present  5) Follow up/Discharge plan - addiction medicine or pain clinic (or both), prescriber has been Nallely De Los Santos NP            Subjective:    Pain is all over.   Shoulders   Abdominal pain  She declines to have me call her daughters  She states she will recicve palliative care after discharge, she states she is seeing many specialist and is having a hard time deciding who \"does what\".   She is unsure about her health directive.   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, estrogen " receptor positive (H)    Diverticulitis of large intestine without perforation or abscess without bleeding    Pain medication agreement    Panic disorder without agoraphobia    Post traumatic stress disorder    Dehydration    Elevated troponin level not due to acute coronary syndrome    Acute kidney injury (H)    Chronic pain due to neoplasm    Opioid dependence with intoxication delirium (H)    Hypokalemia    Non-traumatic rhabdomyolysis    Essential hypertension    Anemia     LOS: 2 days       amLODIPine  5 mg Oral Daily     budesonide-formoterol  2 puff Inhalation BID     buprenorphine  2 mg Sublingual TID     ciprofloxacin  500 mg Oral Q12H GARRETT     enoxaparin ANTICOAGULANT  40 mg Subcutaneous Q24H     gabapentin  300 mg Oral BID     lactobacillus rhamnosus (GG)  1 capsule Oral TID AC     metroNIDAZOLE  500 mg Oral TID     PHENobarbital  32.4 mg Oral BID     sodium chloride (PF)  3 mL Intracatheter Q8H     venlafaxine  37.5 mg Oral QAM       Objective:  Vital signs in last 24 hours:  Temp:  [98  F (36.7  C)-98.8  F (37.1  C)] 98  F (36.7  C)  Pulse:  [58-86] 61  Resp:  [18-20] 18  BP: (104-139)/(56-69) 116/56  SpO2:  [97 %-98 %] 98 %  Weight:   Weight:   @THISENCWEIGHTS(1)@  Weight change: 2.858 kg (6 lb 4.8 oz)  Body mass index is 24.91 kg/m .    Intake/Output last 3 shifts:  I/O last 3 completed shifts:  In: 720 [P.O.:720]  Out: 950 [Urine:950]  Intake/Output this shift:  I/O this shift:  In: -   Out: 400 [Urine:400]    Review of Systems:   As per subjective, all others negative.    Physical Exam:    General Appearance:  Alert, cooperative, no distress, appears stated age    Head:  Normocephalic, without obvious abnormality, atraumatic   Eyes:  PERRL, conjunctiva/corneas clear, EOM's intact   Nose: Nares normal, septum midline, mucosa normal, no drainage   Throat: Lips normal    Neck: Supple, symmetrical, trachea midline, no adenopathy, thyroid: not enlarged, symmetric    Back:   Symmetric, no curvature, ROM  normal   Lungs:    , respirations unlabored   Chest Wall:  No tenderness or deformity   Heart:  No sob    Abdomen:   Soft, non-tender,   No diarrhea    Extremities: Extremities normal    Skin: Skin color normal    Neurologic: Alert and oriented X 3, Moves all 4 extremities        Lab Results:  Personally Reviewed.   Recent Labs   Lab 07/14/21 0311 07/12/21 1959   WBC 6.5 8.7   HGB 9.3* 12.9   HCT 27.7* 37.4    565*     Recent Labs   Lab 07/15/21  0608 07/14/21  0311 07/13/21  1033 07/12/21 1959   * 135* 140 143   CO2 21* 16* 18* 17*   BUN 9 21 37* 39*   ALBUMIN  --  3.2* 3.9 4.4   ALKPHOS  --  91 119 160*   ALT  --  18 20 21   AST  --  26 31 31     No results for input(s): INR in the last 168 hours.        Total unit/floor time 25 minutes, time consisted of the following, examination of patient, reviewing the record and lab results, and completing documentation. Coordination of care time with addiction medicine.  Called Palliative.       Christina MEMBRENO, CNS-BC, CNP, ACHPN  Acute Care Pain Management Program Hour 7a-1700  [facility] ([facility], [place], [place])   Page via [facility]- Click HERE to page  or call [phone]             in 1-3 treatments patient will assess ambulation and set goals

## 2023-02-23 NOTE — CONSULT NOTE ADULT - ASSESSMENT
------------------------------------------------------------------------------------------------  ASSESSMENT/PLAN  This is a 89-year-old woman with hypertension, glaucoma, arthritis, presents to [facility] ED on 2/21 with c/o worsened low back pain and difficulty ambulating for the past 2-3 days s/p falls x 2 at home about 2.5 weeks apart. she was found to have acute, nondisplaced, minimally impacted sacral fracture, no surgical intervention.   Medical management per hospitalist  Pain management - Percocet  DVT PPX : SCDs, lovenox  Bowel Regimen: consider adding senna  WB status: WBAT    Rehab Disposition: - Acute Inpatient Rehab     Recommend ACUTE inpatient rehabilitation for the functional deficits consisting of 3 hours of therapy/day & 24 hour RN/daily PMR physician for comorbid medical management. Will continue to follow for ongoing rehab needs and recommendations. Patient will be able to tolerate 3 hours a day.       
  89-year-old woman with hypertension, glaucoma, arthritis, presents to the ED, c/o worsened low back pain and difficulty ambulating for the past 2-3 days.  Pt lives alone and at baseline, walks independently, w/no assistive devices.  Pt states she fell on her buttocks at home, about 2 weeks ago, and she did see ortho.  Pt states the pain did improve after a week, also take s Tylenol prn,  However, she fell again about 3 days ago.  Pt was okay. until for the past 2 days, low back pain has worsened, even with minimal movement.  Pt also reports pain not relieved with Tylenol. pt started taking Advil, but last time was yesterday.  Today, pt with difficulty standing and walking around due to the low back pain.  She deniesnumbness, tingling, she is able to move all her extremities.  She denies fever, chills, chest pain, headache, dizziness, cough, dyspnea, dysuria.  She had nml BM and urination.   Ct head neg for acute hemorrhage, extra-axial collection or displaced calvarial fracture.  Cervical spine CT showed no acute fracture or traumatic subluxation. Lumbar spine CT showed  bilateral sacral germania insufficiency fractures. Multilevel degenerative changes of the lumbar spine. (21 Feb 2023 20:12)    Discussed with Dr Corcoran,    -   Reviewed xray and CT of back  .   Will order CT of pelvis and hip .  Dr Corcoran would like to see CT prior to   making any WB decisions.    Patient could require stabilization of sacrum 2/2 to bilateral fx.     Plan:  Will re evaluate PT and WB status post ct of pelvis            DVt prophylaxis           pain management            Possible surgical intervention vs conservative treatment

## 2023-02-23 NOTE — PROGRESS NOTE ADULT - SUBJECTIVE AND OBJECTIVE BOX
Patient is a 89y old  Female who presents with a chief complaint of Unspecified fracture of sacrum, initial encounter for closed fracture  no events noted overnight  pt denies pain when in bed    Patient seen and examined at bedside    ALLERGIES:  No Known Allergies    MEDICATIONS  (STANDING):  amLODIPine   Tablet 5 milliGRAM(s) Oral daily  calcium carbonate 1250 mG  + Vitamin D (OsCal 500 + D) 1 Tablet(s) Oral daily  enoxaparin Injectable 40 milliGRAM(s) SubCutaneous every 24 hours  latanoprost 0.005% Ophthalmic Solution 1 Drop(s) Both EYES every 24 hours  multivitamin 1 Tablet(s) Oral daily  mupirocin 2% Ointment 1 Application(s) Topical two times a day  triamcinolone 0.1% Ointment 1 Application(s) Topical every 12 hours  vitamin A &amp; D Ointment 1 Application(s) Topical daily    MEDICATIONS  (PRN):  acetaminophen     Tablet .. 650 milliGRAM(s) Oral every 6 hours PRN Temp greater or equal to 38C (100.4F), Mild Pain (1 - 3)  ibuprofen  Tablet. 400 milliGRAM(s) Oral three times a day PRN Moderate Pain (4 - 6)  oxycodone    5 mG/acetaminophen 325 mG 1 Tablet(s) Oral every 4 hours PRN Severe Pain (7 - 10)    Vital Signs Last 24 Hrs  T(F): 97.6 (2023 05:35), Max: 98.3 (2023 19:47)  HR: 75 (2023 10:42) (70 - 82)  BP: 126/68 (2023 10:42) (121/68 - 129/69)  RR: 18 (2023 05:35) (18 - 18)  SpO2: 94% (2023 09:30) (94% - 95%)    I&O's Summary    2023 07:01  -  2023 07:00  --------------------------------------------------------  IN: 600 mL / OUT: 400 mL / NET: 200 mL    PHYSICAL EXAM:  General: NAD, A/O x 3  ENT: MMM  Neck: Supple, No JVD  Abdomen: Soft, Nontender, Nondistended  Extremities: RLE - thigh is soft, knee wnl, calf soft, strength 4/5, pt able to flex/extend knee, EHL/FHL 4/5, skin warm and dry, sensation intact  LLE - thigh is soft, knee wnl, calf soft, strength 4/5, pt able to flex/extend knee, EHL/FHL 4/5, chronic skin changes along shin, sensation intact    LABS:                        11.5   9.73  )-----------( 421      ( 2023 17:50 )             36.0     -    140  |  106  |  38  ----------------------------<  85  4.4   |  28  |  0.92    Ca    9.2      2023 06:30  Mg     1.8         TPro  8.2  /  Alb  2.7  /  TBili  0.2  /  DBili  x   /  AST  30  /  ALT  33  /  AlkPhos  146        < from: CT Pelvis No Cont (23 @ 18:07) >  Healed remoteintertrochanteric fracture of right proximal femur, status   post post open reduction with dynamic hip screw and lateral plate   internal fixation construct without hardware complication. Moderate   arthrosis of the right hip. Status post noncementedleft hip total   arthroplasty with dual acetabular screws and single proximal femoral   cerclage wire augmentation, without evidence for hardware complication.   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    Soft tissues: No mass lesion, hematoma, drainable encapsulated fluid   collection. No pelvic free fluid. Moderate severe disproportionate   chronic atrophy of left hip short external rotators. Distended urinary   bladder. Sigmoid diverticulosis.    IMPRESSION:  1.  Acute nondisplaced, minimally impacted H-shaped sacral insufficiency   fracture with transverse component at S2.  2.  Additional lower grade and chronic findings as detailed in the body   section of this report.    < end of copied text >                            Urinalysis Basic - ( 2023 23:20 )    Color: Yellow / Appearance: Clear / S.010 / pH: x  Gluc: x / Ketone: Negative  / Bili: Negative / Urobili: Negative   Blood: x / Protein: 15 / Nitrite: Negative   Leuk Esterase: Negative / RBC: 0-4 /HPF / WBC 0-2 /HPF   Sq Epi: x / Non Sq Epi: Neg.-Few / Bacteria: Negative /HPF        COVID-19 PCR: NotDetec (23 @ 21:40)      RADIOLOGY & ADDITIONAL TESTS:    Care Discussed with Consultants/Other Providers:

## 2023-02-23 NOTE — GOALS OF CARE CONVERSATION - ADVANCED CARE PLANNING - CONVERSATION DETAILS
Met with patient at bedside. She is A&Ox3. Has come to [facility] from home with severe back pain, had fallen at home onto buttocks 2 weeks ago. Dx. with fx. of saccral germania. Pt. has hx. HTN, glaucoma, arthritis. I reviewed HCP document with patient. She confirmed Ms. Stormy Rizo is her HCP and Georgette Fabian is the alternate agent.    I asked the pt. about GOC. She stated that the DNR form is at home. Ms. Rizo is visiting and said she would bring it from home tomorrow. Pt. did not wish to sign a MOLST form at this time.

## 2023-02-23 NOTE — PHYSICAL THERAPY INITIAL EVALUATION ADULT - ADDITIONAL COMMENTS
pt lives alone in private house, 1 vicki, no rail, 1st floor setup. pt has been using RW since fall but was independent prior. pt was independent w/ADL's

## 2023-02-23 NOTE — PHYSICAL THERAPY INITIAL EVALUATION ADULT - PERTINENT HX OF CURRENT PROBLEM, REHAB EVAL
89-year-old woman with hypertension, glaucoma, arthritis, presents to the ED, c/o worsened low back pain and difficulty ambulating for the past 2-3 days.  Pt lives alone and at baseline, walks independently, w/no assistive devices.  Pt states she fell on her buttocks at home, about 2 weeks ago, and she did see ortho.  Pt states the pain did improve after a week, also take s Tylenol prn,  However, she fell again about 3 days ago.  Pt was okay. until for the past 2 days, low back pain has worsened, even with minimal movement.  Pt also reports pain not relieved with Tylenol. pt started taking Advil, but last time was yesterday.  Today, pt with difficulty standing and walking around due to the low back pain.  She deniesnumbness, tingling, she is able to move all her extremities.

## 2023-02-24 ENCOUNTER — TRANSCRIPTION ENCOUNTER (OUTPATIENT)
Age: 88
End: 2023-02-24

## 2023-02-24 ENCOUNTER — INPATIENT (INPATIENT)
Facility: HOSPITAL | Age: 88
LOS: 10 days | Discharge: NURSING, REHAB OR LONG-TERM CARE | DRG: 949 | End: 2023-03-07
Attending: PHYSICAL MEDICINE & REHABILITATION | Admitting: PHYSICAL MEDICINE & REHABILITATION
Payer: MEDICARE

## 2023-02-24 VITALS
SYSTOLIC BLOOD PRESSURE: 133 MMHG | TEMPERATURE: 98 F | RESPIRATION RATE: 18 BRPM | DIASTOLIC BLOOD PRESSURE: 71 MMHG | OXYGEN SATURATION: 96 % | HEART RATE: 78 BPM

## 2023-02-24 VITALS — TEMPERATURE: 98 F

## 2023-02-24 DIAGNOSIS — K59.00 CONSTIPATION, UNSPECIFIED: ICD-10-CM

## 2023-02-24 DIAGNOSIS — Z96.659 PRESENCE OF UNSPECIFIED ARTIFICIAL KNEE JOINT: Chronic | ICD-10-CM

## 2023-02-24 DIAGNOSIS — Z51.89 ENCOUNTER FOR OTHER SPECIFIED AFTERCARE: ICD-10-CM
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
## 2023-02-24 DIAGNOSIS — S32.19XA OTHER FRACTURE OF SACRUM, INITIAL ENCOUNTER FOR CLOSED FRACTURE: ICD-10-CM

## 2023-02-24 DIAGNOSIS — R60.0 LOCALIZED EDEMA: ICD-10-CM

## 2023-02-24 DIAGNOSIS — Z96.651 PRESENCE OF RIGHT ARTIFICIAL KNEE JOINT: ICD-10-CM

## 2023-02-24 DIAGNOSIS — H40.9 UNSPECIFIED GLAUCOMA: ICD-10-CM

## 2023-02-24 DIAGNOSIS — I10 ESSENTIAL (PRIMARY) HYPERTENSION: ICD-10-CM

## 2023-02-24 DIAGNOSIS — R26.9 UNSPECIFIED ABNORMALITIES OF GAIT AND MOBILITY: ICD-10-CM

## 2023-02-24 DIAGNOSIS — Z96.649 PRESENCE OF UNSPECIFIED ARTIFICIAL HIP JOINT: Chronic | ICD-10-CM

## 2023-02-24 DIAGNOSIS — Z96.642 PRESENCE OF LEFT ARTIFICIAL HIP JOINT: Chronic | ICD-10-CM

## 2023-02-24 DIAGNOSIS — M15.9 POLYOSTEOARTHRITIS, UNSPECIFIED: ICD-10-CM

## 2023-02-24 DIAGNOSIS — Z96.642 PRESENCE OF LEFT ARTIFICIAL HIP JOINT: ICD-10-CM

## 2023-02-24 DIAGNOSIS — M25.511 PAIN IN RIGHT SHOULDER: ICD-10-CM
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
## 2023-02-24 DIAGNOSIS — S32.19XD OTHER FRACTURE OF SACRUM, SUBSEQUENT ENCOUNTER FOR FRACTURE WITH ROUTINE HEALING: ICD-10-CM

## 2023-02-24 DIAGNOSIS — M21.372 FOOT DROP, LEFT FOOT: ICD-10-CM

## 2023-02-24 DIAGNOSIS — L03.116 CELLULITIS OF LEFT LOWER LIMB: ICD-10-CM

## 2023-02-24 DIAGNOSIS — M75.01 ADHESIVE CAPSULITIS OF RIGHT SHOULDER: ICD-10-CM

## 2023-02-24 LAB
SARS-COV-2 RNA SPEC QL NAA+PROBE: SIGNIFICANT CHANGE UP
SARS-COV-2 RNA SPEC QL NAA+PROBE: SIGNIFICANT CHANGE UP

## 2023-02-24 PROCEDURE — 82550 ASSAY OF CK (CPK): CPT

## 2023-02-24 PROCEDURE — 85027 COMPLETE CBC AUTOMATED: CPT

## 2023-02-24 PROCEDURE — 99231 SBSQ HOSP IP/OBS SF/LOW 25: CPT

## 2023-02-24 PROCEDURE — 87635 SARS-COV-2 COVID-19 AMP PRB: CPT

## 2023-02-24 PROCEDURE — 72170 X-RAY EXAM OF PELVIS: CPT

## 2023-02-24 PROCEDURE — 97530 THERAPEUTIC ACTIVITIES: CPT

## 2023-02-24 PROCEDURE — 70450 CT HEAD/BRAIN W/O DYE: CPT | Mod: MA

## 2023-02-24 PROCEDURE — 96360 HYDRATION IV INFUSION INIT: CPT

## 2023-02-24 PROCEDURE — 72192 CT PELVIS W/O DYE: CPT

## 2023-02-24 PROCEDURE — 36415 COLL VENOUS BLD VENIPUNCTURE: CPT

## 2023-02-24 PROCEDURE — 93005 ELECTROCARDIOGRAM TRACING: CPT

## 2023-02-24 PROCEDURE — 81001 URINALYSIS AUTO W/SCOPE: CPT

## 2023-02-24 PROCEDURE — 97162 PT EVAL MOD COMPLEX 30 MIN: CPT

## 2023-02-24 PROCEDURE — 83735 ASSAY OF MAGNESIUM: CPT

## 2023-02-24 PROCEDURE — 99222 1ST HOSP IP/OBS MODERATE 55: CPT | Mod: GC

## 2023-02-24 PROCEDURE — 72125 CT NECK SPINE W/O DYE: CPT | Mod: MA

## 2023-02-24 PROCEDURE — 76376 3D RENDER W/INTRP POSTPROCES: CPT

## 2023-02-24 PROCEDURE — 71045 X-RAY EXAM CHEST 1 VIEW: CPT

## 2023-02-24 PROCEDURE — 80048 BASIC METABOLIC PNL TOTAL CA: CPT

## 2023-02-24 PROCEDURE — 99285 EMERGENCY DEPT VISIT HI MDM: CPT

## 2023-02-24 PROCEDURE — 99239 HOSP IP/OBS DSCHRG MGMT >30: CPT

## 2023-02-24 PROCEDURE — 72131 CT LUMBAR SPINE W/O DYE: CPT | Mod: MA

## 2023-02-24 PROCEDURE — 80053 COMPREHEN METABOLIC PANEL: CPT

## 2023-02-24 PROCEDURE — 97167 OT EVAL HIGH COMPLEX 60 MIN: CPT

## 2023-02-24 RX ORDER — SALICYLIC ACID 0.5 %
1 CLEANSER (GRAM) TOPICAL DAILY
Refills: 0 | Status: DISCONTINUED | OUTPATIENT
Start: 2023-02-25 | End: 2023-03-07

## 2023-02-24 RX ORDER — POLYETHYLENE GLYCOL 3350 17 G/17G
17 POWDER, FOR SOLUTION ORAL DAILY
Refills: 0 | Status: DISCONTINUED | OUTPATIENT
Start: 2023-02-24 | End: 2023-02-24

## 2023-02-24 RX ORDER — ACETAMINOPHEN 500 MG
2 TABLET ORAL
Qty: 0 | Refills: 0 | DISCHARGE
Start: 2023-02-24

## 2023-02-24 RX ORDER — LATANOPROST 0.05 MG/ML
1 SOLUTION/ DROPS OPHTHALMIC; TOPICAL EVERY 24 HOURS
Refills: 0 | Status: DISCONTINUED | OUTPATIENT
Start: 2023-02-24 | End: 2023-03-07

## 2023-02-24 RX ORDER — IBUPROFEN 200 MG
400 TABLET ORAL THREE TIMES A DAY
Refills: 0 | Status: DISCONTINUED | OUTPATIENT
Start: 2023-02-24 | End: 2023-03-07

## 2023-02-24 RX ORDER — POLYETHYLENE GLYCOL 3350 17 G/17G
17 POWDER, FOR SOLUTION ORAL DAILY
Refills: 0 | Status: DISCONTINUED | OUTPATIENT
Start: 2023-02-25 | End: 2023-02-27

## 2023-02-24 RX ORDER — ENOXAPARIN SODIUM 100 MG/ML
40 INJECTION SUBCUTANEOUS EVERY 24 HOURS
Refills: 0 | Status: DISCONTINUED | OUTPATIENT
Start: 2023-02-25 | End: 2023-03-07

## 2023-02-24 RX ORDER — SENNA PLUS 8.6 MG/1
2 TABLET ORAL
Qty: 0 | Refills: 0 | DISCHARGE
Start: 2023-02-24

## 2023-02-24 RX ORDER — OXYCODONE AND ACETAMINOPHEN 5; 325 MG/1; MG/1
1 TABLET ORAL
Qty: 0 | Refills: 0 | DISCHARGE
Start: 2023-02-24

## 2023-02-24 RX ORDER — ENOXAPARIN SODIUM 100 MG/ML
40 INJECTION SUBCUTANEOUS
Qty: 0 | Refills: 0 | DISCHARGE
Start: 2023-02-24

## 2023-02-24 RX ORDER — ACETAMINOPHEN 500 MG
650 TABLET ORAL EVERY 6 HOURS
Refills: 0 | Status: DISCONTINUED | OUTPATIENT
Start: 2023-02-24 | End: 2023-03-07

## 2023-02-24 RX ORDER — MUPIROCIN 20 MG/G
1 OINTMENT TOPICAL
Qty: 0 | Refills: 0 | DISCHARGE
Start: 2023-02-24

## 2023-02-24 RX ORDER — CEPHALEXIN 500 MG
1 CAPSULE ORAL
Qty: 0 | Refills: 0 | DISCHARGE
Start: 2023-02-24

## 2023-02-24 RX ORDER — CEPHALEXIN 500 MG
500 CAPSULE ORAL EVERY 12 HOURS
Refills: 0 | Status: DISCONTINUED | OUTPATIENT
Start: 2023-02-24 | End: 2023-03-02

## 2023-02-24 RX ORDER — AMLODIPINE BESYLATE 2.5 MG/1
5 TABLET ORAL DAILY
Refills: 0 | Status: DISCONTINUED | OUTPATIENT
Start: 2023-02-25 | End: 2023-03-07

## 2023-02-24 RX ORDER — SENNA PLUS 8.6 MG/1
2 TABLET ORAL DAILY
Refills: 0 | Status: DISCONTINUED | OUTPATIENT
Start: 2023-02-24 | End: 2023-02-24

## 2023-02-24 RX ORDER — MUPIROCIN 20 MG/G
1 OINTMENT TOPICAL
Refills: 0 | Status: DISCONTINUED | OUTPATIENT
Start: 2023-02-25 | End: 2023-03-07

## 2023-02-24 RX ORDER — POLYETHYLENE GLYCOL 3350 17 G/17G
17 POWDER, FOR SOLUTION ORAL
Qty: 0 | Refills: 0 | DISCHARGE
Start: 2023-02-24

## 2023-02-24 RX ORDER — LIDOCAINE 4 G/100G
1 CREAM TOPICAL
Refills: 0 | Status: DISCONTINUED | OUTPATIENT
Start: 2023-02-24 | End: 2023-03-07

## 2023-02-24 RX ORDER — SENNA PLUS 8.6 MG/1
2 TABLET ORAL AT BEDTIME
Refills: 0 | Status: DISCONTINUED | OUTPATIENT
Start: 2023-02-24 | End: 2023-03-07

## 2023-02-24 RX ORDER — SENNA PLUS 8.6 MG/1
2 TABLET ORAL DAILY
Refills: 0 | Status: DISCONTINUED | OUTPATIENT
Start: 2023-02-25 | End: 2023-02-25

## 2023-02-24 RX ADMIN — LATANOPROST 1 DROP(S): 0.05 SOLUTION/ DROPS OPHTHALMIC; TOPICAL at 20:57

## 2023-02-24 RX ADMIN — Medication 1 TABLET(S): at 12:24

## 2023-02-24 RX ADMIN — Medication 500 MILLIGRAM(S): at 18:10

## 2023-02-24 RX ADMIN — Medication 500 MILLIGRAM(S): at 05:34

## 2023-02-24 RX ADMIN — Medication 1 APPLICATION(S): at 12:25

## 2023-02-24 RX ADMIN — SENNA PLUS 2 TABLET(S): 8.6 TABLET ORAL at 12:24

## 2023-02-24 RX ADMIN — Medication 1 APPLICATION(S): at 05:33

## 2023-02-24 RX ADMIN — MUPIROCIN 1 APPLICATION(S): 20 OINTMENT TOPICAL at 05:33

## 2023-02-24 RX ADMIN — Medication 1 ENEMA: at 20:00

## 2023-02-24 RX ADMIN — POLYETHYLENE GLYCOL 3350 17 GRAM(S): 17 POWDER, FOR SOLUTION ORAL at 12:24

## 2023-02-24 RX ADMIN — ENOXAPARIN SODIUM 40 MILLIGRAM(S): 100 INJECTION SUBCUTANEOUS at 14:23

## 2023-02-24 RX ADMIN — AMLODIPINE BESYLATE 5 MILLIGRAM(S): 2.5 TABLET ORAL at 05:34

## 2023-02-24 RX ADMIN — MUPIROCIN 1 APPLICATION(S): 20 OINTMENT TOPICAL at 14:24

## 2023-02-24 NOTE — DISCHARGE NOTE PROVIDER - HOSPITAL COURSE
.HPI:  89-year-old woman with hypertension, glaucoma, arthritis, presents to the ED, c/o worsened low back pain and difficulty ambulating for the past 2-3 days.  Pt lives alone and at baseline, walks independently, w/no assistive devices.  Pt states she fell on her buttocks at home, about 2 weeks ago, and she did see ortho.  Pt states the pain did improve after a week, also take s Tylenol prn,  However, she fell again about 3 days ago.  Pt was okay. until for the past 2 days, low back pain has worsened, even with minimal movement.  Pt also reports pain not relieved with Tylenol. pt started taking Advil, but last time was yesterday.  Today, pt with difficulty standing and walking around due to the low back pain.  She deniesnumbness, tingling, she is able to move all her extremities.  She denies fever, chills, chest pain, headache, dizziness, cough, dyspnea, dysuria.  She had nml BM and urination.   Ct head neg for acute hemorrhage, extra-axial collection or displaced calvarial fracture.  Cervical spine CT showed no acute fracture or traumatic subluxation. Lumbar spine CT showed bilateral sacral germania insufficiency fractures. Multilevel degenerative changes of the lumbar spine. (21 Feb 2023 20:12)    Hospital Course:   Patient was admitted for bilateral sacral germania insufficiency fractures. Ortho Dr. Corcoran was consulted, recommended nonsurgical management. Patient was seen by  nurse for b/l LE rash (worse on left) - she had not been compliant with home regimen and had restarted it recently. She was started on Keflex for mild LLE cellulitis. Seen by PT OT PM&R, accepted to acute rehab.    Palliative Care / Advanced Care Planning  Code Status: DNR but MOLST at home  Patient/Family agreeable to Hospice/Palliative (Y/N)? N  Summary of Goals of Care Conversation: Patient's HCP was to bring MOLST in from home as patient did not want to sign a new one at this time    Discharging Provider: Tao Alejo D.O.  Contact Info: Cell [phone] - Please call with any questions or concerns.

## 2023-02-24 NOTE — OCCUPATIONAL THERAPY INITIAL EVALUATION ADULT - PERTINENT HX OF CURRENT PROBLEM, REHAB EVAL
Pt is 89-year-old woman with hypertension, glaucoma, arthritis, presents to the ED, c/o worsened low back pain and difficulty ambulating for the past 2-3 days.  Pt lives alone and at baseline, walks independently, w/no assistive devices.  Pt states she fell on her buttocks at home, about 2 weeks ago, and she did see ortho.  Pt states the pain did improve after a week, also take s Tylenol prn,  However, she fell again about 3 days ago.  Pt was okay. until for the past 2 days, low back pain has worsened, even with minimal movement.  Pt also reports pain not relieved with Tylenol. pt started taking Advil, but last time was yesterday.  Today, pt with difficulty standing and walking around due to the low back pain.  She denies numbness, tingling, she is able to move all her extremities. Pt presents with a chief complaint of sacral fracture- worsened low back pain, diff ambulating

## 2023-02-24 NOTE — PROGRESS NOTE ADULT - SUBJECTIVE AND OBJECTIVE BOX
Patient admitted with bilateral sacral fx s / difficulty in ambulation     Patient was seen by me this am.   Patient feeling better.  She has been OOB with PT.  She is still c/o pain but her most important complaint is constipation.  She denies any CP or SOB     Vital Signs Last 24 Hrs  T(C): 36.4 (24 Feb 2023 14:07), Max: 36.6 (23 Feb 2023 21:30)  T(F): 97.5 (24 Feb 2023 14:07), Max: 97.9 (23 Feb 2023 21:30)  HR: 78 (24 Feb 2023 13:15) (73 - 81)  BP: 109/70 (24 Feb 2023 13:15) (109/70 - 133/68)  BP(mean): 88 (24 Feb 2023 05:01) (88 - 88)  RR: 18 (24 Feb 2023 05:01) (18 - 18)  SpO2: 96% (24 Feb 2023 13:15) (95% - 96%)    Parameters below as of 24 Feb 2023 13:15  Patient On (Oxygen Delivery Method): room air    PE:  Alert and oriented X 3  Lungs:  cta  cor:  RR S1S2  Abd:  softly distended, + BS , voiding - BM  Ext:  w/o  edema w/o calf pain bilateral lower leg dermatitis     MEDICATIONS  (STANDING):  amLODIPine   Tablet 5 milliGRAM(s) Oral daily  calcium carbonate 1250 mG  + Vitamin D (OsCal 500 + D) 1 Tablet(s) Oral daily  cephalexin 500 milliGRAM(s) Oral every 12 hours  enoxaparin Injectable 40 milliGRAM(s) SubCutaneous every 24 hours  latanoprost 0.005% Ophthalmic Solution 1 Drop(s) Both EYES every 24 hours  multivitamin 1 Tablet(s) Oral daily  mupirocin 2% Ointment 1 Application(s) Topical two times a day  polyethylene glycol 3350 17 Gram(s) Oral daily  senna 2 Tablet(s) Oral daily  triamcinolone 0.1% Ointment 1 Application(s) Topical two times a day  vitamin A &amp; D Ointment 1 Application(s) Topical daily    MEDICATIONS  (PRN):  acetaminophen     Tablet .. 650 milliGRAM(s) Oral every 6 hours PRN Temp greater or equal to 38C (100.4F), Mild Pain (1 - 3)  ibuprofen  Tablet. 400 milliGRAM(s) Oral three times a day PRN Moderate Pain (4 - 6)  oxycodone    5 mG/acetaminophen 325 mG 1 Tablet(s) Oral every 4 hours PRN Severe Pain (7 - 10)

## 2023-02-24 NOTE — H&P ADULT - NS ATTEND AMEND GEN_ALL_CORE FT
Rehab Attending- Patient seen and examined by me - Case discussed, above note reviewed by me with modifications made    Rehab gait ab SP fall sacral Fx- good candidate for intensive rehab - will tolerate three hours rehab daily  noted Left Foot drop/ weakness in eversion/ plantarflexion- ? old vs New- contributing to falls  pain management- percoset PRN, ICE to low back, lidoderm  Constipation- senna, miralax- Fleet enema tonight  DVT proph - Lovenox  HELDER- push PO- follow BMP in AM

## 2023-02-24 NOTE — DISCHARGE NOTE PROVIDER - NSDCMRMEDTOKEN_GEN_ALL_CORE_FT
acetaminophen 325 mg oral tablet: 2 tab(s) orally every 6 hours, As needed, Temp greater or equal to 38C (100.4F), Mild Pain (1 - 3)  amLODIPine 5 mg oral tablet: 1 tab(s) orally once a day  calcium-vitamin D 500 mg-5 mcg (200 intl units) oral tablet: 1 tab(s) orally once a day  cephalexin 500 mg oral capsule: 1 cap(s) orally every 12 hours  enoxaparin: 40 milligram(s) subcutaneous once a day  latanoprost 0.005% ophthalmic solution: 1 drop(s) to each affected eye once a day (in the evening)  Multiple Vitamins oral tablet: 1 tab(s) orally once a day  mupirocin 2% topical ointment: 1 application topically 2 times a day  oxycodone-acetaminophen 5 mg-325 mg oral tablet: 1 tab(s) orally every 4 hours, As needed, Severe Pain (7 - 10)  polyethylene glycol 3350 oral powder for reconstitution: 17 gram(s) orally once a day  senna leaf extract oral tablet: 2 tab(s) orally once a day  triamcinolone 0.1% topical ointment: 1 application topically 2 times a day

## 2023-02-24 NOTE — PATIENT PROFILE ADULT - FALL HARM RISK - HARM RISK INTERVENTIONS

## 2023-02-24 NOTE — H&P ADULT - NSHPLABSRESULTS_GEN_ALL_CORE
RECENT LABS/IMAGING    02-23    140  |  106  |  38<H>  ----------------------------<  85  4.4   |  28  |  0.92    Ca    9.2      23 Feb 2023 06:30      CT Pelvis No Cont (02.22.23 @ 18:07)     IMPRESSION:  1.  Acute nondisplaced, minimally impacted H-shaped sacral insufficiency fracture with transverse component at S2.  2.  Additional lower grade and chronic findings as detailed in the body section of this report.    CT Lumbar Spine No Cont (02.21.23 @ 18:47)     IMPRESSION:    Brain CT: No acute hemorrhage, extra-axial collection or displaced calvarial fracture.    Cervical spine CT: No acute fracture or traumatic subluxation.  Lumbar spine CT: Bilateral sacral germania insufficiency fractures.   Multilevel degenerative changes of the lumbar spine.       X-ray Pelvis AP only (02.21.23 @ 18:43)     Impression:    No acute pulmonary disease.  Limited evaluation of the pelvis does not demonstrate any obvious   fracture or bony lesion. If symptoms persist recommend CT.

## 2023-02-24 NOTE — OCCUPATIONAL THERAPY INITIAL EVALUATION ADULT - ADDITIONAL COMMENTS
As per pt, pt resides alone in private home +1 ANDIE to enter and maintains on main level. Pt reports having +tub +curtain with grab bars and shower chair. As per pt, PTA pt was independent with all self-care tasks and transfers +drives, and performs IADLs. Pt reports having shower chair, grab bars, RW, and cane within the home.

## 2023-02-24 NOTE — DISCHARGE NOTE NURSING/CASE MANAGEMENT/SOCIAL WORK - NSDCPEFALRISK_GEN_ALL_CORE
For information on Fall & Injury Prevention, visit: [email or web address] OR  [email or web address] OR  https://www.cdc.gov/steadi/patient.html

## 2023-02-24 NOTE — H&P ADULT - ASSESSMENT
ASSESSMENT/PLAN  This is a 89-year-old woman with hypertension, glaucoma, arthritis, presents to [facility] ED on 2/21 with c/o worsened low back pain and difficulty ambulating for the past 2-3 days s/p falls x 2 at home about 2.5 weeks apart. She was found to have acute, nondisplaced, minimally impacted sacral fracture, no surgical intervention. Patient now with gait Instability, ADL impairments and Functional impairments.    #Sacral Fracture  - No surgical intervention  - Start Comprehensive Rehab Program: PT/OT, 3hours daily and 5 days weekly  - PT: Focused on improving strength, endurance, coordination, balance, functional mobility, and transfers  - OT: Focused on improving strength, fine motor skills, coordination, posture and ADLs.    - WB Status: WBAT    #HTN  - Amlodipine 5mg daily    #Cellulitis  - Keflex 500mg q 12 hrs  - mupirocin oint    #Glaucoma  - Xalatan opthal    #Pain management  - Tylenol PRN, ibuprofen, Oxycodone PRN    #DVT ppx  - Lovenox, SCD, TEDs    #Bowel Regimen  - Senna, miralax     #Bladder management  - BS on admission, and q 8 hours (SC if > 400)  - Monitor UO    #FEN   - Diet: Renal   - Supplements: oscal D     #Skin:  - Skin on admission: ***  - Pressure injury/Skin: Turn Q2hrs while in bed, OOB to Chair, PT/OT     #Sleep:   - Maintain quiet hours and low stim environment.  - Melatonin PRN to maximize participation in therapy during the day.       #Precaution  - Fall, Aspiration    #GOC  CODE STATUS: FULL CODE     Outpatient Follow-up (Specialty/Name of physician):        MEDICAL PROGNOSIS: GOOD            REHAB POTENTIAL: GOOD             ESTIMATED DISPOSITION: HOME WITH HOME CARE            ELOS: 10-14 Days   EXPECTED THERAPY:     P.T. 1hr/day       O.T. 1hr/day      S.L.P. 0hr/day     P&O Unnecessary     EXP FREQUENCY: 5 days per 7 day period     PRESCREEN COMPARISON:   I have reviewed the prescreen information and I have found no relevant changes between the preadmission screening and my post admission evaluation     RATIONALE FOR INPATIENT ADMISSION - Patient demonstrates the following: (check all that apply)  [X] Medically appropriate for rehabilitation admission  [X] Has attainable rehab goals with an appropriate initial discharge plan  [X] Has rehabilitation potential (expected to make a significant improvement within a reasonable period of time)   [X] Requires close medical management by a rehab physician, rehab nursing care, Hospitalist and comprehensive interdisciplinary team (including PT, OT, & or SLP, Prosthetics and Orthotics)   ASSESSMENT/PLAN  This is a 89-year-old woman with hypertension, glaucoma, arthritis, presents to [facility] ED on 2/21 with c/o worsened low back pain and difficulty ambulating for the past 2-3 days s/p falls x 2 at home about 2.5 weeks apart. She was found to have acute, nondisplaced, minimally impacted sacral fracture, no surgical intervention. Patient now with gait Instability, ADL impairments and Functional impairments.    #Sacral Fracture  - No surgical intervention  - Start Comprehensive Rehab Program: PT/OT, 3hours daily and 5 days weekly  - PT: Focused on improving strength, endurance, coordination, balance, functional mobility, and transfers  - OT: Focused on improving strength, fine motor skills, coordination, posture and ADLs.    - WB Status: WBAT  management- percoset PRN, add lidoderm, ICE    #HTN  - Amlodipine 5mg daily    #Cellulitis  - Keflex 500mg q 12 hrs  - Topical Triamcinolone BID to lower legs with Mupirocin PRN to open wounds    #Glaucoma  - Xalatan opthal    HELDER  BUN 38  encourage PO fluids    #Pain management  - Tylenol PRN, ibuprofen, Oxycodone PRN    #DVT ppx  - Lovenox,     #Bowel Regimen  - Senna, miralax   no BM since 2/19- Fleet enema tonight    #Bladder management  - BS on admission, and q 8 hours (SC if > 400)  - Monitor UO    #FEN   - Diet: Renal   - Supplements: oscal D     #Skin:  - Skin on admission: intact  - Pressure injury/Skin: Turn Q2hrs while in bed, OOB to Chair, PT/OT     #Sleep:   - Maintain quiet hours and low stim environment.  - Melatonin PRN to maximize participation in therapy during the day.       #Precaution  - Fall, Aspiration    #GOC  CODE STATUS: FULL CODE     Outpatient Follow-up (Specialty/Name of physician):        MEDICAL PROGNOSIS: GOOD            REHAB POTENTIAL: GOOD             ESTIMATED DISPOSITION: HOME WITH HOME CARE            ELOS: 10-14 Days   EXPECTED THERAPY:     P.T. 2hr/day       O.T. 1hr/day      S.L.P. 0hr/day     P&O Unnecessary     EXP FREQUENCY: 5 days per 7 day period     PRESCREEN COMPARISON:   I have reviewed the prescreen information and I have found no relevant changes between the preadmission screening and my post admission evaluation     RATIONALE FOR INPATIENT ADMISSION - Patient demonstrates the following: (check all that apply)  [X] Medically appropriate for rehabilitation admission  [X] Has attainable rehab goals with an appropriate initial discharge plan  [X] Has rehabilitation potential (expected to make a significant improvement within a reasonable period of time)   [X] Requires close medical management by a rehab physician, rehab nursing care, Hospitalist and comprehensive interdisciplinary team (including PT, OT, & or SLP, Prosthetics and Orthotics)

## 2023-02-24 NOTE — PROGRESS NOTE ADULT - REASON FOR ADMISSION
sacral fracture- worsened low back pain, diff ambulating

## 2023-02-24 NOTE — H&P ADULT - HISTORY OF PRESENT ILLNESS
This is a 89-year-old woman with hypertension, glaucoma, arthritis, presents to [facility] ED on 2/21 with c/o worsened low back pain and difficulty ambulating for the past 2-3 days.  Pt lives alone and at baseline, walks independently, with no assistive devices.  Pt states she fell on her buttocks at home, about 2 weeks prior and she did see ortho.  Pt stated the pain did improve after a week, also take Tylenol prn.  However, she fell again about 3 days prior to presentataion.  Pt was okay. until for the past 2 days, low back pain has worsened, even with minimal movement.  Pt also reports pain not relieved with Tylenol. pt started taking Advil, but last time was yesterday.  She has difficulty standing and walking around due to the low back pain. She denied numbness, tingling, she is able to move all her extremities.  She denies fever, chills, chest pain, headache, dizziness, cough, dyspnea, dysuria.  She had nml BM and urination.   CT head neg for acute hemorrhage, extra-axial collection or displaced calvarial fracture.  Cervical spine CT showed no acute fracture or traumatic subluxation. Lumbar spine CT showed bilateral sacral germania insufficiency fractures. Multilevel degenerative changes of the lumbar spine. She was seen by ortho for acute, nondisplaced, minimally impacted sacral fracture, no surgical intervention, WBAT.

## 2023-02-24 NOTE — OCCUPATIONAL THERAPY INITIAL EVALUATION ADULT - GENERAL OBSERVATIONS, REHAB EVAL
Pt received seated bedside in chair, AXOx3, +primafit, +right IV lock, and agreeable. Pt left supine in bed, +bed alarm, all lines intact, call bell in reach, VSS, NAD, friend present bedside. RN Eduardo aware.

## 2023-02-24 NOTE — H&P ADULT - NSHPPHYSICALEXAM_GEN_ALL_CORE
PHYSICAL EXAMINATION   VItals: T(C): 36.6 (02-24-23 @ 17:14), Max: 36.6 (02-23-23 @ 21:30)  HR: 78 (02-24-23 @ 17:14) (73 - 81)  BP: 133/71 (02-24-23 @ 17:14) (109/70 - 133/71)  RR: 18 (02-24-23 @ 17:14) (18 - 18)  SpO2: 96% (02-24-23 @ 17:14) (95% - 96%)    General: NAD, Resting Comfortable,                                  HEENT: NC/AT, EOM I, PERRLA, Normal Conjunctivae  Cardio: RRR, Normal S1-S2, No M/G/R                              Pulm: No Respiratory Distress,  Lungs CTAB                        Abdomen: ND/NT, Soft, BS+                                                MSK: No joint swelling, Right shoulder Frozen                                        Ext: 1+ edema, No calf tenderness    Skin:  Left lower leg with scabs, erythema, sl warmth, no drainage                                                                Wounds: none  Decubitus Ulcers: None Present     Neurological Examination    Cognitive: AAO x 3                                                                         Attention: Intact   Judgment: Good evidence of judgement                               Memory: Recall 3 objects immediate and 3 min later      Mood/Affect: wnl                                                                           Communication:  Fluent,  No dysarthria   Swallow: intact  CN II - XII  intact                                                                            Sensory: Intact to light touch all ext                                                                                             Tone: normal Throughout   Balance: impaired    Motor    LEFT    UE: SF [3/5], EF [5/5], EE [5/5], WE [5/5],  [wnl]  RIGHT UE: SF [3-/5], EF [5/5], EE [5/5], WE [5/5],  [wnl]  LEFT    LE:  HF [4/5], KE [5/5], DF [2/5], EHL [1/5],  PF [3/5] Eversion 1/5  RIGHT LE:  HF [3+/5 secondary to pain], KE [5/5], DF [5/5], EHL [5/5],  PF [5/5]      Reflex:  2 + thoroughout, Cope/Babinski negative

## 2023-02-24 NOTE — DISCHARGE NOTE NURSING/CASE MANAGEMENT/SOCIAL WORK - PATIENT PORTAL LINK FT
You can access the FollowMyHealth Patient Portal offered by [facility] by registering at the following website: [email or web address]. By joining [facility]’s FollowMyHealth portal, you will also be able to view your health information using other applications (apps) compatible with our system.

## 2023-02-24 NOTE — DISCHARGE NOTE PROVIDER - ATTENDING DISCHARGE PHYSICAL EXAMINATION:
General: NAD, lying in bed  Eyes: Anicteric  ENT: Moist mucous membranes  Neck: Supple, No JVD  Lungs: Clear to auscultation bilaterally, normal respiratory effort  Cardio: RRR, S1/S2, No murmurs  Abdomen: Soft, Nontender, Nondistended; Bowel sounds present  Extremities: No calf tenderness, No pitting edema, no digital cyanosis  Skin: Warm, dry, left lower leg with healing scabs, diffuse erythema and slightly warm to palpation, no drainage or TTP  Psych: A&O x 3, follows commands    Vital Signs Last 24 Hrs  T(C): 36.4 (24 Feb 2023 14:07), Max: 36.6 (23 Feb 2023 21:30)  T(F): 97.5 (24 Feb 2023 14:07), Max: 97.9 (23 Feb 2023 21:30)  HR: 78 (24 Feb 2023 13:15) (73 - 81)  BP: 109/70 (24 Feb 2023 13:15) (109/70 - 133/68)  BP(mean): 88 (24 Feb 2023 05:01) (88 - 88)  RR: 18 (24 Feb 2023 05:01) (18 - 18)  SpO2: 96% (24 Feb 2023 13:15) (95% - 96%)    Parameters below as of 24 Feb 2023 13:15  Patient On (Oxygen Delivery Method): room air

## 2023-02-24 NOTE — H&P ADULT - NSHPSOCIALHISTORY_GEN_ALL_CORE
Smoking - denies  EtOH - denies  Drugs - denies    FUNCTIONAL HISTORY:   Patient lives alone in PH 2 [address]IE and 1 FOS to 2nd floor. she stays on 1st floor.  PTA: Independent in ADLs and ambulation. Patient  has been using RW since fall. She recently hired someone to help her at home a couple of days a week    CURRENT FUNCTIONAL STATUS  Date: 2/23  Bed Mobility: mod a  Transfers: mod a, 2 person

## 2023-02-24 NOTE — DISCHARGE NOTE PROVIDER - NSDCCPCAREPLAN_GEN_ALL_CORE_FT
PRINCIPAL DISCHARGE DIAGNOSIS  Diagnosis: Fracture of sacrum  Assessment and Plan of Treatment: Discharge to Acute Rehab

## 2023-02-24 NOTE — DISCHARGE NOTE PROVIDER - NSDCFUSCHEDAPPT_GEN_ALL_CORE_FT
[facility]  ORTHOSURG [address] [place] [address]  Scheduled Appointment: 03/13/2023

## 2023-02-24 NOTE — DISCHARGE NOTE PROVIDER - NSDCPNSUBOBJ_GEN_ALL_CORE
Patient seen and examined at bedside on day of discharge, see PE on previous tab. Feeling well with no complaints.

## 2023-02-24 NOTE — PROGRESS NOTE ADULT - ASSESSMENT
89 year old female with hx of htn, glaucoma, arthritis, admitted with bilateral sacral fx s now awaiting acute rehab evaluation .         Plan:  PT WBAT             DVT prophylaxis            pain management            Bowel regimen for constipation

## 2023-02-24 NOTE — H&P ADULT - NSHPREVIEWOFSYSTEMS_GEN_ALL_CORE
REVIEW OF SYSTEMS  Constitutional: No fever, No Chills, No fatigue  HEENT: No eye pain, No visual disturbances, No difficulty hearing  Pulm: No cough,  No shortness of breath  Cardio: No chest pain, No palpitations  GI:  No abdominal pain, No nausea, No vomiting, No diarrhea, + constipation no BM since 2/19  : No dysuria, No frequency, No hematuria occ nighttime incontinence- uses pads  Neuro: No headaches, No memory loss, No loss of strength, No numbness, No tremors  Skin: No itching, No rashes, + lesions Both legs  Endo: No temperature intolerance  MSK: both shoulder pain, No joint swelling, No muscle pain, No Neck  pain + Low back pain  Psych:  No depression, No anxiety REVIEW OF SYSTEMS  Constitutional: No fever, No Chills, No fatigue  HEENT: No eye pain, No visual disturbances, No difficulty hearing  Pulm: No cough,  No shortness of breath  Cardio: No chest pain, No palpitations  GI:  No abdominal pain, No nausea, No vomiting, No diarrhea, + constipation no BM since 2/19  : No dysuria, No frequency, No hematuria occ nighttime incontinence- uses pads  Neuro: No headaches, No memory loss, No loss of strength, No numbness, No tremors  Skin: No itching, No rashes, + lesions Both legs  Endo: No temperature intolerance  MSK: melissa shoulder pain, No joint swelling, No muscle pain, No Neck  pain + Low back pain  Psych:  No depression, No anxiety

## 2023-02-24 NOTE — OCCUPATIONAL THERAPY INITIAL EVALUATION ADULT - RANGE OF MOTION EXAMINATION, UPPER EXTREMITY
except bilateral shoulders due to age-related degeneration/bilateral UE Active ROM was WNL (within normal limits)

## 2023-02-25 LAB
ALBUMIN SERPL ELPH-MCNC: 2.4 G/DL — LOW (ref 3.3–5)
ALP SERPL-CCNC: 143 U/L — HIGH (ref 40–120)
ALT FLD-CCNC: 34 U/L — SIGNIFICANT CHANGE UP (ref 10–45)
ANION GAP SERPL CALC-SCNC: 5 MMOL/L — SIGNIFICANT CHANGE UP (ref 5–17)
AST SERPL-CCNC: 32 U/L — SIGNIFICANT CHANGE UP (ref 10–40)
BASOPHILS # BLD AUTO: 0.03 K/UL — SIGNIFICANT CHANGE UP (ref 0–0.2)
BASOPHILS NFR BLD AUTO: 0.4 % — SIGNIFICANT CHANGE UP (ref 0–2)
BILIRUB SERPL-MCNC: 0.2 MG/DL — SIGNIFICANT CHANGE UP (ref 0.2–1.2)
BUN SERPL-MCNC: 33 MG/DL — HIGH (ref 7–23)
CALCIUM SERPL-MCNC: 8.8 MG/DL — SIGNIFICANT CHANGE UP (ref 8.4–10.5)
CHLORIDE SERPL-SCNC: 102 MMOL/L — SIGNIFICANT CHANGE UP (ref 96–108)
CO2 SERPL-SCNC: 30 MMOL/L — SIGNIFICANT CHANGE UP (ref 22–31)
CREAT SERPL-MCNC: 0.76 MG/DL — SIGNIFICANT CHANGE UP (ref 0.5–1.3)
EGFR: 74 ML/MIN/1.73M2 — SIGNIFICANT CHANGE UP
EOSINOPHIL # BLD AUTO: 0.2 K/UL — SIGNIFICANT CHANGE UP (ref 0–0.5)
EOSINOPHIL NFR BLD AUTO: 2.6 % — SIGNIFICANT CHANGE UP (ref 0–6)
GLUCOSE SERPL-MCNC: 93 MG/DL — SIGNIFICANT CHANGE UP (ref 70–99)
HCT VFR BLD CALC: 33.6 % — LOW (ref 34.5–45)
HGB BLD-MCNC: 10.7 G/DL — LOW (ref 11.5–15.5)
IMM GRANULOCYTES NFR BLD AUTO: 0.3 % — SIGNIFICANT CHANGE UP (ref 0–0.9)
LYMPHOCYTES # BLD AUTO: 1.14 K/UL — SIGNIFICANT CHANGE UP (ref 1–3.3)
LYMPHOCYTES # BLD AUTO: 15 % — SIGNIFICANT CHANGE UP (ref 13–44)
MCHC RBC-ENTMCNC: 27.4 PG — SIGNIFICANT CHANGE UP (ref 27–34)
MCHC RBC-ENTMCNC: 31.8 GM/DL — LOW (ref 32–36)
MCV RBC AUTO: 86.2 FL — SIGNIFICANT CHANGE UP (ref 80–100)
MONOCYTES # BLD AUTO: 0.74 K/UL — SIGNIFICANT CHANGE UP (ref 0–0.9)
MONOCYTES NFR BLD AUTO: 9.7 % — SIGNIFICANT CHANGE UP (ref 2–14)
NEUTROPHILS # BLD AUTO: 5.49 K/UL — SIGNIFICANT CHANGE UP (ref 1.8–7.4)
NEUTROPHILS NFR BLD AUTO: 72 % — SIGNIFICANT CHANGE UP (ref 43–77)
NRBC # BLD: 0 /100 WBCS — SIGNIFICANT CHANGE UP (ref 0–0)
PLATELET # BLD AUTO: 363 K/UL — SIGNIFICANT CHANGE UP (ref 150–400)
POTASSIUM SERPL-MCNC: 4 MMOL/L — SIGNIFICANT CHANGE UP (ref 3.5–5.3)
POTASSIUM SERPL-SCNC: 4 MMOL/L — SIGNIFICANT CHANGE UP (ref 3.5–5.3)
PROT SERPL-MCNC: 7.3 G/DL — SIGNIFICANT CHANGE UP (ref 6–8.3)
RBC # BLD: 3.9 M/UL — SIGNIFICANT CHANGE UP (ref 3.8–5.2)
RBC # FLD: 15.4 % — HIGH (ref 10.3–14.5)
SODIUM SERPL-SCNC: 137 MMOL/L — SIGNIFICANT CHANGE UP (ref 135–145)
WBC # BLD: 7.62 K/UL — SIGNIFICANT CHANGE UP (ref 3.8–10.5)
WBC # FLD AUTO: 7.62 K/UL — SIGNIFICANT CHANGE UP (ref 3.8–10.5)

## 2023-02-25 PROCEDURE — 73030 X-RAY EXAM OF SHOULDER: CPT | Mod: 26,RT

## 2023-02-25 PROCEDURE — 99232 SBSQ HOSP IP/OBS MODERATE 35: CPT

## 2023-02-25 PROCEDURE — 99223 1ST HOSP IP/OBS HIGH 75: CPT

## 2023-02-25 RX ORDER — LIDOCAINE 4 G/100G
1 CREAM TOPICAL DAILY
Refills: 0 | Status: DISCONTINUED | OUTPATIENT
Start: 2023-02-25 | End: 2023-03-07

## 2023-02-25 RX ADMIN — MUPIROCIN 1 APPLICATION(S): 20 OINTMENT TOPICAL at 17:06

## 2023-02-25 RX ADMIN — MUPIROCIN 1 APPLICATION(S): 20 OINTMENT TOPICAL at 05:47

## 2023-02-25 RX ADMIN — LIDOCAINE 1 PATCH: 4 CREAM TOPICAL at 19:32

## 2023-02-25 RX ADMIN — LIDOCAINE 1 PATCH: 4 CREAM TOPICAL at 07:43

## 2023-02-25 RX ADMIN — SENNA PLUS 2 TABLET(S): 8.6 TABLET ORAL at 22:19

## 2023-02-25 RX ADMIN — Medication 500 MILLIGRAM(S): at 17:05

## 2023-02-25 RX ADMIN — Medication 1 TABLET(S): at 11:41

## 2023-02-25 RX ADMIN — AMLODIPINE BESYLATE 5 MILLIGRAM(S): 2.5 TABLET ORAL at 05:48

## 2023-02-25 RX ADMIN — Medication 400 MILLIGRAM(S): at 10:46

## 2023-02-25 RX ADMIN — Medication 500 MILLIGRAM(S): at 05:48

## 2023-02-25 RX ADMIN — Medication 1 APPLICATION(S): at 17:06

## 2023-02-25 RX ADMIN — LIDOCAINE 1 PATCH: 4 CREAM TOPICAL at 11:39

## 2023-02-25 RX ADMIN — Medication 1 TABLET(S): at 11:40

## 2023-02-25 RX ADMIN — ENOXAPARIN SODIUM 40 MILLIGRAM(S): 100 INJECTION SUBCUTANEOUS at 11:40

## 2023-02-25 RX ADMIN — LIDOCAINE 1 PATCH: 4 CREAM TOPICAL at 22:21

## 2023-02-25 RX ADMIN — Medication 1 APPLICATION(S): at 05:47

## 2023-02-25 RX ADMIN — LATANOPROST 1 DROP(S): 0.05 SOLUTION/ DROPS OPHTHALMIC; TOPICAL at 22:18

## 2023-02-25 RX ADMIN — Medication 400 MILLIGRAM(S): at 11:45

## 2023-02-25 RX ADMIN — Medication 1 APPLICATION(S): at 11:41

## 2023-02-25 RX ADMIN — POLYETHYLENE GLYCOL 3350 17 GRAM(S): 17 POWDER, FOR SOLUTION ORAL at 11:41

## 2023-02-25 NOTE — DIETITIAN INITIAL EVALUATION ADULT - OTHER INFO
89-year-old woman with hypertension, glaucoma, arthritis, presents to [facility] ED on 2/21 with c/o worsened low back pain and difficulty ambulating for the past 2-3 days s/p falls x 2 at home about 2.5 weeks apart. She was found to have acute, nondisplaced, minimally impacted sacral fracture, no surgical intervention. Patient reports a good appetite , no recent weight loss reported , (+) BM (2/24) Po intake note good 50-75% , c/o constipation , bowel regimen noted , s/p fleets enema with (+) results . Skin intact , ecchymotic, Patient reports height of 5'8" , UBW 170lbs . Hx of  HTN noted encourage DASH/TLC diet , received prior education reinforced diet principles

## 2023-02-25 NOTE — CONSULT NOTE ADULT - SUBJECTIVE AND OBJECTIVE BOX
Patient is a 89y old  Female who presents with a chief complaint of sacral fracture (25 Feb 2023 11:50)    HPI:    89 y/o woman with hypertension, glaucoma, arthritis, presents to [facility] ED on 2/21 with c/o worsened low back pain and difficulty ambulating for the past 2-3 days.  Pt lives alone and at baseline, walks independently, with no assistive devices.  Pt states she fell on her buttocks at home, about 2 weeks prior and she did see ortho.  Pt stated the pain did improve after a week, also take Tylenol prn.  However, she fell again about 3 days prior to presentataion.  Pt was okay. until for the past 2 days, low back pain has worsened, even with minimal movement.  Pt also reports pain not relieved with Tylenol. pt started taking Advil, but last time was yesterday.  She has difficulty standing and walking around due to the low back pain. She denied numbness, tingling, she is able to move all her extremities.  She denies fever, chills, chest pain, headache, dizziness, cough, dyspnea, dysuria.  She had nml BM and urination.   CT head neg for acute hemorrhage, extra-axial collection or displaced calvarial fracture.  Cervical spine CT showed no acute fracture or traumatic subluxation. Lumbar spine CT showed bilateral sacral germania insufficiency fractures. Multilevel degenerative changes of the lumbar spine. She was seen by ortho for acute, nondisplaced, minimally impacted sacral fracture, no surgical intervention, WBAT.      (24 Feb 2023 13:22)      PAST MEDICAL & SURGICAL HISTORY:  Hypertension      History of total knee replacement  right      History of total hip arthroplasty  right with pinning      History of hip replacement, total, left          Father: - at age - with history of   Mother: - at age - with history of       Allergies    No Known Allergies    Intolerances        lidocaine   4% Patch 1 Patch Transdermal <User Schedule>  lidocaine   4% Patch 1 Patch Transdermal daily  triamcinolone 0.1% Ointment 1 Application(s) Topical two times a day      REVIEW OF SYSTEMS:  CONSTITUTIONAL: No fever, weight loss, or fatigue  EYES: No eye pain, visual disturbances, or discharge  ENMT:  No difficulty hearing, tinnitus, vertigo; No sinus or throat pain  NECK: No pain or stiffness  BREASTS: No pain, masses, or nipple discharge  RESPIRATORY: No cough, wheezing, chills or hemoptysis; No shortness of breath  CARDIOVASCULAR: No chest pain, palpitations, dizziness, or leg swelling  GASTROINTESTINAL: No abdominal or epigastric pain. No nausea, vomiting, or hematemesis; No diarrhea or constipation. No melena or hematochezia.  GENITOURINARY: No dysuria, frequency, hematuria, or incontinence  NEUROLOGICAL: No headaches, memory loss, loss of strength, numbness, or tremors  SKIN: No itching, burning, rashes, or lesions   LYMPH NODES: No enlarged glands  ENDOCRINE: No heat or cold intolerance; No hair loss  MUSCULOSKELETAL: No joint pain or swelling; No muscle, back, or extremity pain  PSYCHIATRIC: No depression, anxiety, mood swings, or difficulty sleeping  HEME/LYMPH: No easy bruising, or bleeding gums  ALLERY AND IMMUNOLOGIC: No hives or eczema    ALL ROS REVIEWED AND NORMAL EXCEPT AS STATED ABOVE    T(C): 36.6 (02-25-23 @ 07:39), Max: 36.7 (02-24-23 @ 21:03)  HR: 75 (02-25-23 @ 07:39) (74 - 87)  BP: 131/80 (02-25-23 @ 07:39) (109/70 - 133/71)  RR: 16 (02-25-23 @ 07:39) (16 - 18)  SpO2: 96% (02-25-23 @ 07:39) (94% - 96%)  Wt(kg): --Vital Signs Last 24 Hrs  T(C): 36.6 (25 Feb 2023 07:39), Max: 36.7 (24 Feb 2023 21:03)  T(F): 97.8 (25 Feb 2023 07:39), Max: 98 (24 Feb 2023 21:03)  HR: 75 (25 Feb 2023 07:39) (74 - 87)  BP: 131/80 (25 Feb 2023 07:39) (109/70 - 133/71)  BP(mean): 92 (24 Feb 2023 17:14) (92 - 92)  RR: 16 (25 Feb 2023 07:39) (16 - 18)  SpO2: 96% (25 Feb 2023 07:39) (94% - 96%)    Parameters below as of 25 Feb 2023 07:39  Patient On (Oxygen Delivery Method): room air    PHYSICAL EXAM:        LABS:                        10.7   7.62  )-----------( 363      ( 25 Feb 2023 06:20 )             33.6     02-25    137  |  102  |  33<H>  ----------------------------<  93  4.0   |  30  |  0.76    Ca    8.8      25 Feb 2023 06:20    TPro  7.3  /  Alb  2.4<L>  /  TBili  0.2  /  DBili  x   /  AST  32  /  ALT  34  /  AlkPhos  143<H>  02-25         CAPILLARY BLOOD GLUCOSE                RADIOLOGY & ADDITIONAL TESTS:    Consultant(s) Notes Reviewed:  [x ] YES  [ ] NO  Care Discussed with Consultants/Other Providers [ x] YES  [ ] NO  Imaging Personally Reviewed:  [ ] YES  [ ] NO Patient is a 89y old  Female who presents with a chief complaint of sacral fracture (25 Feb 2023 11:50)    HPI:    89 y/o woman with hypertension, glaucoma, arthritis, presents to [facility] ED on 2/21 with c/o worsened low back pain and difficulty ambulating for the past 2-3 days.  Pt lives alone and at baseline, walks independently, with no assistive devices.  Pt states she fell on her buttocks at home, about 2 weeks prior and she did see ortho.  Pt stated the pain did improve after a week, also take Tylenol prn.  However, she fell again about 3 days prior to presentataion.  Pt was okay. until for the past 2 days, low back pain has worsened, even with minimal movement.  Pt also reports pain not relieved with Tylenol. pt started taking Advil, but last time was yesterday.  She has difficulty standing and walking around due to the low back pain. She denied numbness, tingling, she is able to move all her extremities.  She denies fever, chills, chest pain, headache, dizziness, cough, dyspnea, dysuria.  She had nml BM and urination.   CT head neg for acute hemorrhage, extra-axial collection or displaced calvarial fracture.  Cervical spine CT showed no acute fracture or traumatic subluxation. Lumbar spine CT showed bilateral sacral germania insufficiency fractures. Multilevel degenerative changes of the lumbar spine. She was seen by ortho for acute, nondisplaced, minimally impacted sacral fracture, no surgical intervention, WBAT.      (24 Feb 2023 13:22)      PAST MEDICAL & SURGICAL HISTORY:  Hypertension      History of total knee replacement  right      History of total hip arthroplasty  right with pinning      History of hip replacement, total, left          Father: - at age - with history of   Mother: - at age - with history of       Allergies    No Known Allergies    Intolerances        lidocaine   4% Patch 1 Patch Transdermal <User Schedule>  lidocaine   4% Patch 1 Patch Transdermal daily  triamcinolone 0.1% Ointment 1 Application(s) Topical two times a day      REVIEW OF SYSTEMS:  CONSTITUTIONAL: No fever, weight loss, or fatigue  EYES: No eye pain, visual disturbances, or discharge  ENMT:  No difficulty hearing, tinnitus, vertigo; No sinus or throat pain  NECK: No pain or stiffness  BREASTS: No pain, masses, or nipple discharge  RESPIRATORY: No cough, wheezing, chills or hemoptysis; No shortness of breath  CARDIOVASCULAR: No chest pain, palpitations, dizziness, or leg swelling  GASTROINTESTINAL: No abdominal or epigastric pain. No nausea, vomiting, or hematemesis; No diarrhea or constipation. No melena or hematochezia.  GENITOURINARY: No dysuria, frequency, hematuria, or incontinence  NEUROLOGICAL: No headaches, memory loss, loss of strength, numbness, or tremors  SKIN: No itching, burning, rashes, or lesions   LYMPH NODES: No enlarged glands  ENDOCRINE: No heat or cold intolerance; No hair loss  MUSCULOSKELETAL: No joint pain or swelling; No muscle, back, or extremity pain  PSYCHIATRIC: No depression, anxiety, mood swings, or difficulty sleeping  HEME/LYMPH: No easy bruising, or bleeding gums  ALLERY AND IMMUNOLOGIC: No hives or eczema    ALL ROS REVIEWED AND NORMAL EXCEPT AS STATED ABOVE    T(C): 36.6 (02-25-23 @ 07:39), Max: 36.7 (02-24-23 @ 21:03)  HR: 75 (02-25-23 @ 07:39) (74 - 87)  BP: 131/80 (02-25-23 @ 07:39) (109/70 - 133/71)  RR: 16 (02-25-23 @ 07:39) (16 - 18)  SpO2: 96% (02-25-23 @ 07:39) (94% - 96%)  Wt(kg): --Vital Signs Last 24 Hrs  T(C): 36.6 (25 Feb 2023 07:39), Max: 36.7 (24 Feb 2023 21:03)  T(F): 97.8 (25 Feb 2023 07:39), Max: 98 (24 Feb 2023 21:03)  HR: 75 (25 Feb 2023 07:39) (74 - 87)  BP: 131/80 (25 Feb 2023 07:39) (109/70 - 133/71)  BP(mean): 92 (24 Feb 2023 17:14) (92 - 92)  RR: 16 (25 Feb 2023 07:39) (16 - 18)  SpO2: 96% (25 Feb 2023 07:39) (94% - 96%)    Parameters below as of 25 Feb 2023 07:39  Patient On (Oxygen Delivery Method): room air    PHYSICAL EXAM:  Constitutional: No fever, No Chills, No fatigue  HEENT: No eye pain, No visual disturbances, No difficulty hearing  Pulm: No cough,  No shortness of breath  Cardio: No chest pain, No palpitations  GI:  No abdominal pain, No nausea, No vomiting, No diarrhea, + constipation no BM since 2/19  : No dysuria, No frequency, No hematuria occ nighttime incontinence- uses pads  Neuro: No headaches, No memory loss, No loss of strength, No numbness, No tremors  Skin: No itching, No rashes, + lesions Both legs  Endo: No temperature intolerance  MSK: melissa shoulder pain, No joint swelling, No muscle pain, No Neck  pain + Low back pain  Psych:  No depression, No anxiety      LABS:                        10.7   7.62  )-----------( 363      ( 25 Feb 2023 06:20 )             33.6     02-25    137  |  102  |  33<H>  ----------------------------<  93  4.0   |  30  |  0.76    Ca    8.8      25 Feb 2023 06:20    TPro  7.3  /  Alb  2.4<L>  /  TBili  0.2  /  DBili  x   /  AST  32  /  ALT  34  /  AlkPhos  143<H>  02-25      RADIOLOGY & ADDITIONAL TESTS:    Consultant(s) Notes Reviewed:  [x ] YES  [ ] NO  Care Discussed with Consultants/Other Providers [ x] YES  [ ] NO  Imaging Personally Reviewed:  [ ] YES  [ ] NO

## 2023-02-25 NOTE — PROGRESS NOTE ADULT - SUBJECTIVE AND OBJECTIVE BOX
Cc: Gait dysfunction    HPI: Patient with no new medical issues today.  Pain controlled, no chest pain, no N/V, no Fevers/Chills. No other new ROS  Has been tolerating rehabilitation program.    acetaminophen     Tablet .. 650 milliGRAM(s) Oral every 6 hours PRN  amLODIPine   Tablet 5 milliGRAM(s) Oral daily  calcium carbonate 1250 mG  + Vitamin D (OsCal 500 + D) 1 Tablet(s) Oral daily  cephalexin 500 milliGRAM(s) Oral every 12 hours  enoxaparin Injectable 40 milliGRAM(s) SubCutaneous every 24 hours  ibuprofen  Tablet. 400 milliGRAM(s) Oral three times a day PRN  latanoprost 0.005% Ophthalmic Solution 1 Drop(s) Both EYES every 24 hours  lidocaine   4% Patch 1 Patch Transdermal <User Schedule>  lidocaine   4% Patch 1 Patch Transdermal daily  multivitamin 1 Tablet(s) Oral daily  mupirocin 2% Ointment 1 Application(s) Topical two times a day  oxycodone    5 mG/acetaminophen 325 mG 1 Tablet(s) Oral every 4 hours PRN  polyethylene glycol 3350 17 Gram(s) Oral daily  senna 2 Tablet(s) Oral at bedtime  triamcinolone 0.1% Ointment 1 Application(s) Topical two times a day  vitamin A &amp; D Ointment 1 Application(s) Topical daily      T(C): 36.6 (02-25-23 @ 07:39), Max: 36.7 (02-24-23 @ 21:03)  HR: 75 (02-25-23 @ 07:39) (74 - 87)  BP: 131/80 (02-25-23 @ 07:39) (109/70 - 133/71)  RR: 16 (02-25-23 @ 07:39) (16 - 18)  SpO2: 96% (02-25-23 @ 07:39) (94% - 96%)    In NAD  HEENT- EOMI  Heart- No cyanosis   Lungs- No respiratory distress   Abd- + BS, NT  Ext- No calf pain  Neuro- Exam unchanged                          10.7   7.62  )-----------( 363      ( 25 Feb 2023 06:20 )             33.6     02-25    137  |  102  |  33<H>  ----------------------------<  93  4.0   |  30  |  0.76    Ca    8.8      25 Feb 2023 06:20    TPro  7.3  /  Alb  2.4<L>  /  TBili  0.2  /  DBili  x   /  AST  32  /  ALT  34  /  AlkPhos  143<H>  02-25        Imp: Patient with diagnosis of Sacral Fracture admitted for comprehensive acute rehabilitation.    Plan:  - Sacral Fracture-  Continue PT/OT  - DVT prophylaxis - Lovenox   - Skin- Turn q2h, check skin daily  - Continue current medications; patient medically stable.   -Active issues-   -Cellulitis -  cephalexin  -HTN -  amlodipine  - Patient is stable to continue current rehabilitation program.

## 2023-02-25 NOTE — DIETITIAN INITIAL EVALUATION ADULT - PERTINENT MEDS FT
MEDICATIONS  (STANDING):  amLODIPine   Tablet 5 milliGRAM(s) Oral daily  calcium carbonate 1250 mG  + Vitamin D (OsCal 500 + D) 1 Tablet(s) Oral daily  cephalexin 500 milliGRAM(s) Oral every 12 hours  enoxaparin Injectable 40 milliGRAM(s) SubCutaneous every 24 hours  latanoprost 0.005% Ophthalmic Solution 1 Drop(s) Both EYES every 24 hours  lidocaine   4% Patch 1 Patch Transdermal <User Schedule>  multivitamin 1 Tablet(s) Oral daily  mupirocin 2% Ointment 1 Application(s) Topical two times a day  polyethylene glycol 3350 17 Gram(s) Oral daily  senna 2 Tablet(s) Oral daily  senna 2 Tablet(s) Oral at bedtime  triamcinolone 0.1% Ointment 1 Application(s) Topical two times a day  vitamin A &amp; D Ointment 1 Application(s) Topical daily    MEDICATIONS  (PRN):  acetaminophen     Tablet .. 650 milliGRAM(s) Oral every 6 hours PRN Temp greater or equal to 38C (100.4F), Mild Pain (1 - 3)  ibuprofen  Tablet. 400 milliGRAM(s) Oral three times a day PRN Moderate Pain (4 - 6)  oxycodone    5 mG/acetaminophen 325 mG 1 Tablet(s) Oral every 4 hours PRN Severe Pain (7 - 10)

## 2023-02-25 NOTE — CONSULT NOTE ADULT - ASSESSMENT
89 y/o woman with HTN, glaucoma, arthritis, presents to [facility] ED on 2/21 with c/o worsened low back pain and difficulty ambulating for the past 2-3 days s/p falls x 2 at home about 2.5 weeks apart. She was found to have acute, nondisplaced, minimally impacted sacral fracture, no surgical intervention. Patient now with gait Instability, ADL impairments and Functional impairments.    Rehab: Functional and gait instability:  - C/w PT/OT per primary team     MSK: Sacral Fracture  - No surgical intervention    CVS: HTN  - Amlodipine 5mg daily    ID: Cellulitis  - Keflex 500mg q 12 hrs  - Topical Triamcinolone BID to lower legs with Mupirocin PRN to open wounds    DVT ppx  - Lovenox,     #Bowel Regimen  - Senna, miralax   no BM since 2/19- Fleet enema tonight    #Bladder management  - BS on admission, and q 8 hours (SC if > 400)  - Monitor UO    #FEN   - Diet: Renal   - Supplements: oscal D     #Skin:  - Skin on admission: intact  - Pressure injury/Skin: Turn Q2hrs while in bed, OOB to Chair, PT/OT     CODE STATUS: FULL CODE

## 2023-02-25 NOTE — DIETITIAN INITIAL EVALUATION ADULT - PERTINENT LABORATORY DATA
02-25    137  |  102  |  33<H>  ----------------------------<  93  4.0   |  30  |  0.76    Ca    8.8      25 Feb 2023 06:20    TPro  7.3  /  Alb  2.4<L>  /  TBili  0.2  /  DBili  x   /  AST  32  /  ALT  34  /  AlkPhos  143<H>  02-25

## 2023-02-26 DIAGNOSIS — I10 ESSENTIAL (PRIMARY) HYPERTENSION: ICD-10-CM

## 2023-02-26 DIAGNOSIS — S32.10XA UNSPECIFIED FRACTURE OF SACRUM, INITIAL ENCOUNTER FOR CLOSED FRACTURE: ICD-10-CM

## 2023-02-26 DIAGNOSIS — L03.90 CELLULITIS, UNSPECIFIED: ICD-10-CM

## 2023-02-26 PROCEDURE — 99232 SBSQ HOSP IP/OBS MODERATE 35: CPT

## 2023-02-26 RX ORDER — DICLOFENAC SODIUM 30 MG/G
2 GEL TOPICAL
Refills: 0 | Status: DISCONTINUED | OUTPATIENT
Start: 2023-02-26 | End: 2023-03-07

## 2023-02-26 RX ADMIN — LATANOPROST 1 DROP(S): 0.05 SOLUTION/ DROPS OPHTHALMIC; TOPICAL at 20:20

## 2023-02-26 RX ADMIN — Medication 1 APPLICATION(S): at 05:17

## 2023-02-26 RX ADMIN — POLYETHYLENE GLYCOL 3350 17 GRAM(S): 17 POWDER, FOR SOLUTION ORAL at 12:22

## 2023-02-26 RX ADMIN — LIDOCAINE 1 PATCH: 4 CREAM TOPICAL at 07:56

## 2023-02-26 RX ADMIN — Medication 400 MILLIGRAM(S): at 11:00

## 2023-02-26 RX ADMIN — MUPIROCIN 1 APPLICATION(S): 20 OINTMENT TOPICAL at 17:11

## 2023-02-26 RX ADMIN — Medication 500 MILLIGRAM(S): at 05:17

## 2023-02-26 RX ADMIN — Medication 1 APPLICATION(S): at 17:11

## 2023-02-26 RX ADMIN — SENNA PLUS 2 TABLET(S): 8.6 TABLET ORAL at 20:20

## 2023-02-26 RX ADMIN — LIDOCAINE 1 PATCH: 4 CREAM TOPICAL at 12:23

## 2023-02-26 RX ADMIN — ENOXAPARIN SODIUM 40 MILLIGRAM(S): 100 INJECTION SUBCUTANEOUS at 12:22

## 2023-02-26 RX ADMIN — Medication 1 TABLET(S): at 12:23

## 2023-02-26 RX ADMIN — LIDOCAINE 1 PATCH: 4 CREAM TOPICAL at 18:35

## 2023-02-26 RX ADMIN — LIDOCAINE 1 PATCH: 4 CREAM TOPICAL at 20:21

## 2023-02-26 RX ADMIN — Medication 400 MILLIGRAM(S): at 10:08

## 2023-02-26 RX ADMIN — Medication 1 APPLICATION(S): at 12:22

## 2023-02-26 RX ADMIN — AMLODIPINE BESYLATE 5 MILLIGRAM(S): 2.5 TABLET ORAL at 05:17

## 2023-02-26 RX ADMIN — Medication 500 MILLIGRAM(S): at 17:11

## 2023-02-26 RX ADMIN — LIDOCAINE 1 PATCH: 4 CREAM TOPICAL at 05:17

## 2023-02-26 RX ADMIN — Medication 1 TABLET(S): at 12:22

## 2023-02-26 RX ADMIN — MUPIROCIN 1 APPLICATION(S): 20 OINTMENT TOPICAL at 05:18

## 2023-02-26 NOTE — PROGRESS NOTE ADULT - ASSESSMENT
89 y/o woman with HTN, glaucoma, arthritis, presents to [facility] ED on 2/21 with c/o worsened low back pain and difficulty ambulating for the past 2-3 days s/p falls x 2 at home about 2.5 weeks apart. She was found to have acute, nondisplaced, minimally impacted sacral fracture, no surgical intervention. Patient now with gait Instability, ADL impairments and Functional impairments.    Sacral Fracture  - continue pain regimen PT OT as outlined by PMR    HTN  - Amlodipine 5mg daily    Cellulitis  - Keflex 500mg q 12 hrs  - Topical Triamcinolone BID to lower legs with Mupirocin PRN to open wounds    DVT ppx  - Lovenox,

## 2023-02-26 NOTE — PROGRESS NOTE ADULT - NSPROGADDITIONALINFOA_GEN_ALL_CORE
I have personally interviewed and examined this patient, reviewed pertinent clinical information, and performed the evaluation and management services provided at today's visit for inpatient medical follow up    I am available to discuss any issues related to the medical care of this patient on the unit, or by phone at [phone]

## 2023-02-26 NOTE — PROGRESS NOTE ADULT - SUBJECTIVE AND OBJECTIVE BOX
Patient is a 89y old  Female who presents with a chief complaint of sacral fracture (26 Feb 2023 09:23)      History, interim events and clinically pertinent issues reviewed; patient interviewed and examined.  Her pain is presently controlled and she has no other c/o  REVIEW OF SYSTEMS:  CONSTITUTIONAL: No fever, weight loss, or fatigue  EYES: No eye pain, visual disturbances, or discharge  ENMT:  No difficulty hearing, tinnitus, vertigo; No sinus or throat pain  NECK: No pain or stiffness  BREASTS: No pain, masses, or nipple discharge  RESPIRATORY: No cough, wheezing, chills or hemoptysis; No shortness of breath  CARDIOVASCULAR: No chest pain, palpitations, dizziness, or leg swelling  GASTROINTESTINAL: No abdominal or epigastric pain. No nausea, vomiting, or hematemesis; No diarrhea or constipation. No melena or hematochezia.  GENITOURINARY: No dysuria, frequency, hematuria, or incontinence  NEUROLOGICAL: No headaches, memory loss, loss of strength, numbness, or tremors  SKIN: No itching, burning, rashes, or lesions   LYMPH NODES: No enlarged glands  ENDOCRINE: No heat or cold intolerance; No hair loss  MUSCULOSKELETAL: No joint pain or swelling; No muscle, back, or extremity pain  PSYCHIATRIC: No depression, anxiety, mood swings, or difficulty sleeping  HEME/LYMPH: No easy bruising, or bleeding gums  ALLERY AND IMMUNOLOGIC: No hives or eczema    ALL ROS REVIEWED AND NORMAL EXCEPT AS STATED ABOVE    ALLERGIES:  No Known Allergies    MEDICATIONS  (STANDING):  amLODIPine   Tablet 5 milliGRAM(s) Oral daily  calcium carbonate 1250 mG  + Vitamin D (OsCal 500 + D) 1 Tablet(s) Oral daily  cephalexin 500 milliGRAM(s) Oral every 12 hours  enoxaparin Injectable 40 milliGRAM(s) SubCutaneous every 24 hours  latanoprost 0.005% Ophthalmic Solution 1 Drop(s) Both EYES every 24 hours  lidocaine   4% Patch 1 Patch Transdermal <User Schedule>  lidocaine   4% Patch 1 Patch Transdermal daily  multivitamin 1 Tablet(s) Oral daily  mupirocin 2% Ointment 1 Application(s) Topical two times a day  polyethylene glycol 3350 17 Gram(s) Oral daily  senna 2 Tablet(s) Oral at bedtime  triamcinolone 0.1% Ointment 1 Application(s) Topical two times a day  vitamin A &amp; D Ointment 1 Application(s) Topical daily    MEDICATIONS  (PRN):  acetaminophen     Tablet .. 650 milliGRAM(s) Oral every 6 hours PRN Temp greater or equal to 38C (100.4F), Mild Pain (1 - 3)  ibuprofen  Tablet. 400 milliGRAM(s) Oral three times a day PRN Moderate Pain (4 - 6)  oxycodone    5 mG/acetaminophen 325 mG 1 Tablet(s) Oral every 4 hours PRN Severe Pain (7 - 10)    Vital Signs Last 24 Hrs  T(F): 98.2 (26 Feb 2023 07:55), Max: 98.2 (26 Feb 2023 07:55)  HR: 79 (26 Feb 2023 07:55) (60 - 79)  BP: 96/55 (26 Feb 2023 07:55) (96/55 - 135/70)  RR: 16 (26 Feb 2023 07:55) (16 - 16)  SpO2: 95% (26 Feb 2023 07:55) (95% - 95%)  I&O's Summary    BMI (kg/m2): 25.9 (02-21-23 @ 16:54)          PHYSICAL EXAM:  General: NAD, A/O x 3  ENT: MMM  Neck: Supple, No JVD  Lungs: Clear to auscultation bilaterally  Cardio: RRR, S1/S2, No murmurs  Abdomen: Soft, Nontender, Nondistended; Bowel sounds present  Extremities: No calf tenderness, No pitting edema      LABS:                        10.7   7.62  )-----------( 363      ( 25 Feb 2023 06:20 )             33.6       02-25    137  |  102  |  33  ----------------------------<  93  4.0   |  30  |  0.76    Ca    8.8      25 Feb 2023 06:20    TPro  7.3  /  Alb  2.4  /  TBili  0.2  /  DBili  x   /  AST  32  /  ALT  34  /  AlkPhos  143  02-25                                COVID-19 PCR: NotDetec (02-24-23 @ 22:30)  COVID-19 PCR: NotDetec (02-24-23 @ 10:10)  COVID-19 PCR: NotDetec (02-21-23 @ 21:40)

## 2023-02-26 NOTE — PROGRESS NOTE ADULT - SUBJECTIVE AND OBJECTIVE BOX
Cc: Gait dysfunction    HPI: Patient with no new medical issues today.  Reports some difficulty with shoulder motion.  Denies any recent trauma  Denies any redness or erythema.   Pain controlled, no chest pain, no N/V, no Fevers/Chills. No other new ROS  Has been tolerating rehabilitation program.    acetaminophen     Tablet .. 650 milliGRAM(s) Oral every 6 hours PRN  amLODIPine   Tablet 5 milliGRAM(s) Oral daily  calcium carbonate 1250 mG  + Vitamin D (OsCal 500 + D) 1 Tablet(s) Oral daily  cephalexin 500 milliGRAM(s) Oral every 12 hours  enoxaparin Injectable 40 milliGRAM(s) SubCutaneous every 24 hours  ibuprofen  Tablet. 400 milliGRAM(s) Oral three times a day PRN  latanoprost 0.005% Ophthalmic Solution 1 Drop(s) Both EYES every 24 hours  lidocaine   4% Patch 1 Patch Transdermal <User Schedule>  lidocaine   4% Patch 1 Patch Transdermal daily  multivitamin 1 Tablet(s) Oral daily  mupirocin 2% Ointment 1 Application(s) Topical two times a day  oxycodone    5 mG/acetaminophen 325 mG 1 Tablet(s) Oral every 4 hours PRN  polyethylene glycol 3350 17 Gram(s) Oral daily  senna 2 Tablet(s) Oral at bedtime  triamcinolone 0.1% Ointment 1 Application(s) Topical two times a day  vitamin A &amp; D Ointment 1 Application(s) Topical daily      T(C): 36.8 (02-26-23 @ 07:55), Max: 36.8 (02-26-23 @ 07:55)  HR: 79 (02-26-23 @ 07:55) (60 - 79)  BP: 96/55 (02-26-23 @ 07:55) (96/55 - 135/70)  RR: 16 (02-26-23 @ 07:55) (16 - 16)  SpO2: 95% (02-26-23 @ 07:55) (95% - 95%)    In NAD  HEENT- EOMI  Heart- No cyanosis  Lungs- No respiratory distress  Abd- + BS, NT  Ext- No calf pain  Neuro- Exam unchanged  MSK -  right shoulder ROM limited to pain, no TTP, no redness or increased warmth                        10.7   7.62  )-----------( 363      ( 25 Feb 2023 06:20 )             33.6     02-25    137  |  102  |  33<H>  ----------------------------<  93  4.0   |  30  |  0.76    Ca    8.8      25 Feb 2023 06:20    TPro  7.3  /  Alb  2.4<L>  /  TBili  0.2  /  DBili  x   /  AST  32  /  ALT  34  /  AlkPhos  143<H>  02-25        ACC: [id number] EXAM:  XR SHOULDER COMP MIN 2V RT   ORDERED BY: VIRGEN ORTEGA     PROCEDURE DATE:  02/25/2023          INTERPRETATION:  Clinical history: Pain    Right shoulder 2 views    Degenerative changes are seen without fracture or dislocation.    IMPRESSION: No fracture        Imp: Patient with diagnosis of Sacral Fracture admitted for comprehensive acute rehabilitation.    Plan:  - Sacral Fracture-  Continue PT/OT  - DVT prophylaxis - Lovenox   - Skin- Turn q2h, check skin daily  - Continue current medications; patient medically stable.   -Active issues-   -Cellulitis -  continue cephalexin  -HTN -  amlodipine  -Right shoulder pain -  x-ray reviewed, likely secondary to degenerative changes, start voltaren gel, lidocaine patch   -Pain- tylenol, oxycodone prn for severe pain  - Patient is stable to continue current rehabilitation program.

## 2023-02-27 LAB
ALBUMIN SERPL ELPH-MCNC: 2.5 G/DL — LOW (ref 3.3–5)
ALP SERPL-CCNC: 144 U/L — HIGH (ref 40–120)
ALT FLD-CCNC: 25 U/L — SIGNIFICANT CHANGE UP (ref 10–45)
ANION GAP SERPL CALC-SCNC: 7 MMOL/L — SIGNIFICANT CHANGE UP (ref 5–17)
AST SERPL-CCNC: 29 U/L — SIGNIFICANT CHANGE UP (ref 10–40)
BILIRUB SERPL-MCNC: 0.4 MG/DL — SIGNIFICANT CHANGE UP (ref 0.2–1.2)
BUN SERPL-MCNC: 29 MG/DL — HIGH (ref 7–23)
CALCIUM SERPL-MCNC: 9 MG/DL — SIGNIFICANT CHANGE UP (ref 8.4–10.5)
CHLORIDE SERPL-SCNC: 99 MMOL/L — SIGNIFICANT CHANGE UP (ref 96–108)
CO2 SERPL-SCNC: 29 MMOL/L — SIGNIFICANT CHANGE UP (ref 22–31)
CREAT SERPL-MCNC: 0.75 MG/DL — SIGNIFICANT CHANGE UP (ref 0.5–1.3)
EGFR: 75 ML/MIN/1.73M2 — SIGNIFICANT CHANGE UP
GLUCOSE SERPL-MCNC: 89 MG/DL — SIGNIFICANT CHANGE UP (ref 70–99)
HCT VFR BLD CALC: 35.2 % — SIGNIFICANT CHANGE UP (ref 34.5–45)
HGB BLD-MCNC: 11.1 G/DL — LOW (ref 11.5–15.5)
MCHC RBC-ENTMCNC: 27.4 PG — SIGNIFICANT CHANGE UP (ref 27–34)
MCHC RBC-ENTMCNC: 31.5 GM/DL — LOW (ref 32–36)
MCV RBC AUTO: 86.9 FL — SIGNIFICANT CHANGE UP (ref 80–100)
NRBC # BLD: 0 /100 WBCS — SIGNIFICANT CHANGE UP (ref 0–0)
PLATELET # BLD AUTO: 355 K/UL — SIGNIFICANT CHANGE UP (ref 150–400)
POTASSIUM SERPL-MCNC: 4.1 MMOL/L — SIGNIFICANT CHANGE UP (ref 3.5–5.3)
POTASSIUM SERPL-SCNC: 4.1 MMOL/L — SIGNIFICANT CHANGE UP (ref 3.5–5.3)
PROT SERPL-MCNC: 7.4 G/DL — SIGNIFICANT CHANGE UP (ref 6–8.3)
RBC # BLD: 4.05 M/UL — SIGNIFICANT CHANGE UP (ref 3.8–5.2)
RBC # FLD: 15.8 % — HIGH (ref 10.3–14.5)
SODIUM SERPL-SCNC: 135 MMOL/L — SIGNIFICANT CHANGE UP (ref 135–145)
WBC # BLD: 9.08 K/UL — SIGNIFICANT CHANGE UP (ref 3.8–10.5)
WBC # FLD AUTO: 9.08 K/UL — SIGNIFICANT CHANGE UP (ref 3.8–10.5)

## 2023-02-27 PROCEDURE — 99232 SBSQ HOSP IP/OBS MODERATE 35: CPT | Mod: GC

## 2023-02-27 PROCEDURE — 99232 SBSQ HOSP IP/OBS MODERATE 35: CPT

## 2023-02-27 RX ORDER — GABAPENTIN 400 MG/1
100 CAPSULE ORAL THREE TIMES A DAY
Refills: 0 | Status: DISCONTINUED | OUTPATIENT
Start: 2023-02-27 | End: 2023-03-07

## 2023-02-27 RX ORDER — POLYETHYLENE GLYCOL 3350 17 G/17G
17 POWDER, FOR SOLUTION ORAL
Refills: 0 | Status: DISCONTINUED | OUTPATIENT
Start: 2023-02-27 | End: 2023-03-07

## 2023-02-27 RX ADMIN — LIDOCAINE 1 PATCH: 4 CREAM TOPICAL at 19:27

## 2023-02-27 RX ADMIN — GABAPENTIN 100 MILLIGRAM(S): 400 CAPSULE ORAL at 21:19

## 2023-02-27 RX ADMIN — Medication 1 TABLET(S): at 11:32

## 2023-02-27 RX ADMIN — LIDOCAINE 1 PATCH: 4 CREAM TOPICAL at 19:26

## 2023-02-27 RX ADMIN — SENNA PLUS 2 TABLET(S): 8.6 TABLET ORAL at 21:19

## 2023-02-27 RX ADMIN — Medication 400 MILLIGRAM(S): at 12:57

## 2023-02-27 RX ADMIN — ENOXAPARIN SODIUM 40 MILLIGRAM(S): 100 INJECTION SUBCUTANEOUS at 11:33

## 2023-02-27 RX ADMIN — Medication 1 APPLICATION(S): at 06:19

## 2023-02-27 RX ADMIN — Medication 500 MILLIGRAM(S): at 06:18

## 2023-02-27 RX ADMIN — LIDOCAINE 1 PATCH: 4 CREAM TOPICAL at 11:33

## 2023-02-27 RX ADMIN — Medication 1 APPLICATION(S): at 17:34

## 2023-02-27 RX ADMIN — MUPIROCIN 1 APPLICATION(S): 20 OINTMENT TOPICAL at 06:18

## 2023-02-27 RX ADMIN — MUPIROCIN 1 APPLICATION(S): 20 OINTMENT TOPICAL at 17:35

## 2023-02-27 RX ADMIN — POLYETHYLENE GLYCOL 3350 17 GRAM(S): 17 POWDER, FOR SOLUTION ORAL at 21:19

## 2023-02-27 RX ADMIN — Medication 400 MILLIGRAM(S): at 06:47

## 2023-02-27 RX ADMIN — Medication 400 MILLIGRAM(S): at 13:47

## 2023-02-27 RX ADMIN — Medication 650 MILLIGRAM(S): at 21:21

## 2023-02-27 RX ADMIN — LATANOPROST 1 DROP(S): 0.05 SOLUTION/ DROPS OPHTHALMIC; TOPICAL at 21:19

## 2023-02-27 RX ADMIN — GABAPENTIN 100 MILLIGRAM(S): 400 CAPSULE ORAL at 13:05

## 2023-02-27 RX ADMIN — AMLODIPINE BESYLATE 5 MILLIGRAM(S): 2.5 TABLET ORAL at 06:18

## 2023-02-27 RX ADMIN — LIDOCAINE 1 PATCH: 4 CREAM TOPICAL at 07:37

## 2023-02-27 RX ADMIN — LIDOCAINE 1 PATCH: 4 CREAM TOPICAL at 23:16

## 2023-02-27 RX ADMIN — Medication 500 MILLIGRAM(S): at 17:33

## 2023-02-27 RX ADMIN — POLYETHYLENE GLYCOL 3350 17 GRAM(S): 17 POWDER, FOR SOLUTION ORAL at 11:32

## 2023-02-27 RX ADMIN — Medication 400 MILLIGRAM(S): at 07:39

## 2023-02-27 RX ADMIN — Medication 1 APPLICATION(S): at 11:32

## 2023-02-27 NOTE — PROGRESS NOTE ADULT - ASSESSMENT
This is a 89-year-old woman with hypertension, glaucoma, arthritis, presents to [facility] ED on 2/21 with c/o worsened low back pain and difficulty ambulating for the past 2-3 days s/p falls x 2 at home about 2.5 weeks apart. She was found to have acute, nondisplaced, minimally impacted sacral fracture, no surgical intervention. Patient now with gait Instability, ADL impairments and Functional impairments.    #Sacral Fracture  - No surgical intervention  - Comprehensive Rehab Program: PT/OT, 3hours daily and 5 days weekly  - PT: Focused on improving strength, endurance, coordination, balance, functional mobility, and transfers  - OT: Focused on improving strength, fine motor skills, coordination, posture and ADLs.    - WB Status: WBAT  management- percoset PRN, add lidoderm, ICE- low dose gabapentin started    #HTN  - Amlodipine 5mg daily    #Cellulitis  - Keflex 500mg q 12 hrs  - Topical Triamcinolone BID to lower legs with Mupirocin PRN to open wounds    #Glaucoma  - Xalatan opthal    HELDER  BUN 38>29 improved  encourage PO fluids    #Pain management  - Tylenol PRN, ibuprofen, Oxycodone PRN, [id number] 3x/day added    #DVT ppx  - Lovenox,     #Bowel Regimen  - Senna, miralax   last BM 2/24- increase miralax 2x/day, add dulcolax suppos PRN    #Bladder management  - BS on admission, and q 8 hours (SC if > 400)  - Monitor UO    #FEN   - Diet: Renal   - Supplements: oscal D     #Skin:  - Skin on admission: intact  - Pressure injury/Skin: Turn Q2hrs while in bed, OOB to Chair, PT/OT     #Sleep:   - Maintain quiet hours and low stim environment.  - Melatonin PRN to maximize participation in therapy during the day.       #Precaution  - Fall, Aspiration    #GOC  CODE STATUS: FULL CODE

## 2023-02-27 NOTE — PROGRESS NOTE ADULT - SUBJECTIVE AND OBJECTIVE BOX
Patient is a 89y old  Female who presents with a chief complaint of sacral fracture (27 Feb 2023 14:59)      Patient seen and examined at bedside. No overnight events reported.     ALLERGIES:  No Known Allergies    MEDICATIONS  (STANDING):  amLODIPine   Tablet 5 milliGRAM(s) Oral daily  calcium carbonate 1250 mG  + Vitamin D (OsCal 500 + D) 1 Tablet(s) Oral daily  cephalexin 500 milliGRAM(s) Oral every 12 hours  enoxaparin Injectable 40 milliGRAM(s) SubCutaneous every 24 hours  gabapentin 100 milliGRAM(s) Oral three times a day  latanoprost 0.005% Ophthalmic Solution 1 Drop(s) Both EYES every 24 hours  lidocaine   4% Patch 1 Patch Transdermal <User Schedule>  lidocaine   4% Patch 1 Patch Transdermal daily  multivitamin 1 Tablet(s) Oral daily  mupirocin 2% Ointment 1 Application(s) Topical two times a day  polyethylene glycol 3350 17 Gram(s) Oral two times a day  senna 2 Tablet(s) Oral at bedtime  triamcinolone 0.1% Ointment 1 Application(s) Topical two times a day  vitamin A &amp; D Ointment 1 Application(s) Topical daily    MEDICATIONS  (PRN):  acetaminophen     Tablet .. 650 milliGRAM(s) Oral every 6 hours PRN Temp greater or equal to 38C (100.4F), Mild Pain (1 - 3)  bisacodyl Suppository 10 milliGRAM(s) Rectal daily PRN Constipation  diclofenac sodium 1% Gel 2 Gram(s) Topical two times a day PRN shoulder pain  ibuprofen  Tablet. 400 milliGRAM(s) Oral three times a day PRN Moderate Pain (4 - 6)  oxycodone    5 mG/acetaminophen 325 mG 1 Tablet(s) Oral every 4 hours PRN Severe Pain (7 - 10)    Vital Signs Last 24 Hrs  T(F): 97.5 (27 Feb 2023 07:34), Max: 98.5 (26 Feb 2023 20:22)  HR: 77 (27 Feb 2023 07:34) (74 - 80)  BP: 124/74 (27 Feb 2023 07:34) (118/68 - 130/72)  RR: 16 (27 Feb 2023 07:34) (16 - 16)  SpO2: 96% (27 Feb 2023 07:34) (93% - 96%)  I&O's Summary    PHYSICAL EXAM:  General: NAD, A/O x 3  ENT: No gross hearing impairment, Moist mucous membranes, no thrush  Neck: Supple, No JVD  Lungs: Clear to auscultation bilaterally, good air entry, non-labored breathing  Cardio: RRR, S1/S2, No murmur  Abdomen: Soft, Nontender, Nondistended; Bowel sounds present  Extremities: No calf tenderness, No cyanosis, 1+ pitting edema, mild left ankle warmth  Psych: Appropriate mood and affect  Skin: +tenting    LABS:                        11.1   9.08  )-----------( 355      ( 27 Feb 2023 06:30 )             35.2     02-27    135  |  99  |  29  ----------------------------<  89  4.1   |  29  |  0.75    Ca    9.0      27 Feb 2023 06:30    TPro  7.4  /  Alb  2.5  /  TBili  0.4  /  DBili  x   /  AST  29  /  ALT  25  /  AlkPhos  144  02-27        COVID-19 PCR: NotDetec (02-24-23 @ 22:30)  COVID-19 PCR: NotDetec (02-24-23 @ 10:10)  COVID-19 PCR: NotDetec (02-21-23 @ 21:40)

## 2023-02-27 NOTE — PROGRESS NOTE ADULT - ASSESSMENT
89F with HTN, arthritis, recent recurrent falls at home resulting in sacral fracture, now in acute rehab    #Sacral fracture  -PT/OT  -Pain control    #Essential HTN  -c/w norvasc    #Left ankle cellulitis  -c/w Keflex - day 4/7 today     #DVT ppx: Lovenox

## 2023-02-27 NOTE — PROGRESS NOTE ADULT - SUBJECTIVE AND OBJECTIVE BOX
Chief complaint- Low back pain with difficulty ambulating    This is a 89-year-old woman with hypertension, glaucoma, arthritis, presents to [facility] ED on 2/21 with c/o worsened low back pain and difficulty ambulating for the past 2-3 days.  Pt lives alone and at baseline, walks independently, with no assistive devices.  Pt states she fell on her buttocks at home, about 2 weeks prior and she did see ortho.  Pt stated the pain did improve after a week, also take Tylenol prn.  However, she fell again about 3 days prior to presentataion.  Pt was okay. until for the past 2 days, low back pain has worsened, even with minimal movement.  Pt also reports pain not relieved with Tylenol. pt started taking Advil, but last time was yesterday.  She has difficulty standing and walking around due to the low back pain. She denied numbness, tingling, she is able to move all her extremities.  She denies fever, chills, chest pain, headache, dizziness, cough, dyspnea, dysuria.  She had nml BM and urination.   CT head neg for acute hemorrhage, extra-axial collection or displaced calvarial fracture.  Cervical spine CT showed no acute fracture or traumatic subluxation. Lumbar spine CT showed bilateral sacral germania insufficiency fractures. Multilevel degenerative changes of the lumbar spine. She was seen by ortho for acute, nondisplaced, minimally impacted sacral fracture, no surgical intervention, WBAT.     ROS/subjective  patient seen bedside OOB to WC  back pain persists- worse today since more active  some wooziness on percoset but doesn't want to change to tramadol at present  PVR 73cc  last BM 2/24  no headaches  no SOB, no chest pain   no nausea, no vomiting  pain BUEs - Belkys Right shoulder- Xrays with DJD  rashes in Legs reportedly there for one year  Reports radicular symptoms- Burning down both LEs to feet since fall    MEDICATIONS  (STANDING):  amLODIPine   Tablet 5 milliGRAM(s) Oral daily  calcium carbonate 1250 mG  + Vitamin D (OsCal 500 + D) 1 Tablet(s) Oral daily  cephalexin 500 milliGRAM(s) Oral every 12 hours  enoxaparin Injectable 40 milliGRAM(s) SubCutaneous every 24 hours  gabapentin 100 milliGRAM(s) Oral three times a day  latanoprost 0.005% Ophthalmic Solution 1 Drop(s) Both EYES every 24 hours  lidocaine   4% Patch 1 Patch Transdermal <User Schedule>  lidocaine   4% Patch 1 Patch Transdermal daily  multivitamin 1 Tablet(s) Oral daily  mupirocin 2% Ointment 1 Application(s) Topical two times a day  polyethylene glycol 3350 17 Gram(s) Oral daily  senna 2 Tablet(s) Oral at bedtime  triamcinolone 0.1% Ointment 1 Application(s) Topical two times a day  vitamin A &amp; D Ointment 1 Application(s) Topical daily    MEDICATIONS  (PRN):  acetaminophen     Tablet .. 650 milliGRAM(s) Oral every 6 hours PRN Temp greater or equal to 38C (100.4F), Mild Pain (1 - 3)  diclofenac sodium 1% Gel 2 Gram(s) Topical two times a day PRN shoulder pain  ibuprofen  Tablet. 400 milliGRAM(s) Oral three times a day PRN Moderate Pain (4 - 6)  oxycodone    5 mG/acetaminophen 325 mG 1 Tablet(s) Oral every 4 hours PRN Severe Pain (7 - 10)                            11.1   9.08  )-----------( 355      ( 27 Feb 2023 06:30 )             35.2     02-27    135  |  99  |  29<H>  ----------------------------<  89  4.1   |  29  |  0.75    Ca    9.0      27 Feb 2023 06:30    TPro  7.4  /  Alb  2.5<L>  /  TBili  0.4  /  DBili  x   /  AST  29  /  ALT  25  /  AlkPhos  144<H>  02-27        CAPILLARY BLOOD GLUCOSE          Vital Signs Last 24 Hrs  T(C): 36.4 (27 Feb 2023 07:34), Max: 36.9 (26 Feb 2023 20:22)  T(F): 97.5 (27 Feb 2023 07:34), Max: 98.5 (26 Feb 2023 20:22)  HR: 77 (27 Feb 2023 07:34) (74 - 80)  BP: 124/74 (27 Feb 2023 07:34) (118/68 - 130/72)  BP(mean): --  RR: 16 (27 Feb 2023 07:34) (16 - 16)  SpO2: 96% (27 Feb 2023 07:34) (93% - 96%)    Parameters below as of 27 Feb 2023 07:34  Patient On (Oxygen Delivery Method): room air    General: NAD, Resting Comfortable,                                  HEENT: NC/AT, EOM I, PERRLA, Normal Conjunctivae  Cardio: RRR, Normal S1-S2, No M/G/R                              Pulm: No Respiratory Distress,  Lungs CTAB                        Abdomen: ND/NT, Soft, BS+                                                MSK: No joint swelling, Right shoulder Frozen                                        Ext: 1+ edema, No calf tenderness    Skin:  Left lower leg with scabs, erythema, sl warmth, no drainage- stable                                                                Wounds: none  Decubitus Ulcers: None Present     Neurological Examination    Cognitive: AAO x 3                                                                         Attention: Intact   Judgment: Good evidence of judgement                               Memory: Recall 3 objects immediate and 3 min later      Mood/Affect: wnl                                                                           Communication:  Fluent,  No dysarthria   Swallow: intact  CN II - XII  intact                                                                            Sensory: Intact to light touch all ext                                                                                             Tone: normal Throughout   Balance: impaired    Motor    LEFT    UE: SF [3/5], EF [5/5], EE [5/5], WE [5/5],  [wnl]  RIGHT UE: SF [3-/5], EF [5/5], EE [5/5], WE [5/5],  [wnl]  LEFT    LE:  HF [4/5], KE [5/5], DF [2/5], EHL [1/5],  PF [3/5] Eversion 1/5  RIGHT LE:  HF [3+/5 secondary to pain], KE [5/5], DF [5/5], EHL [5/5],  PF [5/5]  No apparent SLR  Vitaliy    Reflex:  2 + thoroughout, Cope/Babinski negative      REhab eval in progress

## 2023-02-28 PROCEDURE — 99232 SBSQ HOSP IP/OBS MODERATE 35: CPT

## 2023-02-28 PROCEDURE — 99232 SBSQ HOSP IP/OBS MODERATE 35: CPT | Mod: GC

## 2023-02-28 RX ADMIN — Medication 1 TABLET(S): at 12:33

## 2023-02-28 RX ADMIN — POLYETHYLENE GLYCOL 3350 17 GRAM(S): 17 POWDER, FOR SOLUTION ORAL at 17:27

## 2023-02-28 RX ADMIN — Medication 1 APPLICATION(S): at 12:35

## 2023-02-28 RX ADMIN — GABAPENTIN 100 MILLIGRAM(S): 400 CAPSULE ORAL at 22:42

## 2023-02-28 RX ADMIN — MUPIROCIN 1 APPLICATION(S): 20 OINTMENT TOPICAL at 05:51

## 2023-02-28 RX ADMIN — LIDOCAINE 1 PATCH: 4 CREAM TOPICAL at 05:50

## 2023-02-28 RX ADMIN — Medication 1 APPLICATION(S): at 17:27

## 2023-02-28 RX ADMIN — AMLODIPINE BESYLATE 5 MILLIGRAM(S): 2.5 TABLET ORAL at 05:49

## 2023-02-28 RX ADMIN — LIDOCAINE 1 PATCH: 4 CREAM TOPICAL at 08:04

## 2023-02-28 RX ADMIN — GABAPENTIN 100 MILLIGRAM(S): 400 CAPSULE ORAL at 13:12

## 2023-02-28 RX ADMIN — SENNA PLUS 2 TABLET(S): 8.6 TABLET ORAL at 22:43

## 2023-02-28 RX ADMIN — MUPIROCIN 1 APPLICATION(S): 20 OINTMENT TOPICAL at 17:28

## 2023-02-28 RX ADMIN — LATANOPROST 1 DROP(S): 0.05 SOLUTION/ DROPS OPHTHALMIC; TOPICAL at 22:42

## 2023-02-28 RX ADMIN — Medication 1 TABLET(S): at 12:34

## 2023-02-28 RX ADMIN — ENOXAPARIN SODIUM 40 MILLIGRAM(S): 100 INJECTION SUBCUTANEOUS at 12:35

## 2023-02-28 RX ADMIN — GABAPENTIN 100 MILLIGRAM(S): 400 CAPSULE ORAL at 05:49

## 2023-02-28 RX ADMIN — LIDOCAINE 1 PATCH: 4 CREAM TOPICAL at 19:17

## 2023-02-28 RX ADMIN — Medication 1 APPLICATION(S): at 06:35

## 2023-02-28 RX ADMIN — Medication 500 MILLIGRAM(S): at 17:26

## 2023-02-28 RX ADMIN — Medication 500 MILLIGRAM(S): at 05:49

## 2023-02-28 NOTE — PROGRESS NOTE ADULT - ASSESSMENT
89F with HTN, arthritis, recent recurrent falls at home resulting in sacral fracture, now in acute rehab    #Sacral fracture  -PT/OT  -Pain control    #Essential HTN  -c/w norvasc    #Left ankle cellulitis  -c/w Keflex - day 4/7 today     #DVT ppx: Lovenox    will follow  d/w dr. larson

## 2023-02-28 NOTE — PROGRESS NOTE ADULT - SUBJECTIVE AND OBJECTIVE BOX
Patient is a 89y old  Female who presents with a chief complaint of sacral fracture     Patient seen and examined at bedside. No overnight events reported.   no n/v, no sob, no new complaints    Vital Signs Last 24 Hrs  T(C): 36.7 (28 Feb 2023 08:04), Max: 36.7 (28 Feb 2023 08:04)  T(F): 98.1 (28 Feb 2023 08:04), Max: 98.1 (28 Feb 2023 08:04)  HR: 91 (28 Feb 2023 08:04) (77 - 91)  BP: 115/69 (28 Feb 2023 08:04) (101/56 - 115/69)  BP(mean): --  RR: 16 (28 Feb 2023 08:04) (16 - 16)  SpO2: 93% (28 Feb 2023 08:04) (93% - 96%)    Parameters below as of 28 Feb 2023 08:04  Patient On (Oxygen Delivery Method): room air      GENERAL- NAD  EAR/NOSE/MOUTH/THROAT - no pharyngeal exudates, no oral leisions,  MMM  EYES- JUSTIN, conjunctiva and Sclera clear  NECK- supple  RESPIRATORY-  clear to auscultation bilaterally, non laboured breathing  CARDIOVASCULAR - SIS2, RRR  GI - soft NT BS present  EXTREMITIES- no pedal edema  NEUROLOGY- no gross focal deficits  PSYCHIATRY- AAO X 3                  11.1                 135  | 29   | 29           9.08  >-----------< 355     ------------------------< 89                    35.2                 4.1  | 99   | 0.75                                         Ca 9.0   Mg x     Ph x              MEDICATIONS  (STANDING):  amLODIPine   Tablet 5 milliGRAM(s) Oral daily  calcium carbonate 1250 mG  + Vitamin D (OsCal 500 + D) 1 Tablet(s) Oral daily  cephalexin 500 milliGRAM(s) Oral every 12 hours  enoxaparin Injectable 40 milliGRAM(s) SubCutaneous every 24 hours  gabapentin 100 milliGRAM(s) Oral three times a day  latanoprost 0.005% Ophthalmic Solution 1 Drop(s) Both EYES every 24 hours  lidocaine   4% Patch 1 Patch Transdermal <User Schedule>  lidocaine   4% Patch 1 Patch Transdermal daily  multivitamin 1 Tablet(s) Oral daily  mupirocin 2% Ointment 1 Application(s) Topical two times a day  polyethylene glycol 3350 17 Gram(s) Oral two times a day  senna 2 Tablet(s) Oral at bedtime  triamcinolone 0.1% Ointment 1 Application(s) Topical two times a day  vitamin A &amp; D Ointment 1 Application(s) Topical daily    MEDICATIONS  (PRN):  acetaminophen     Tablet .. 650 milliGRAM(s) Oral every 6 hours PRN Temp greater or equal to 38C (100.4F), Mild Pain (1 - 3)  bisacodyl Suppository 10 milliGRAM(s) Rectal daily PRN Constipation  diclofenac sodium 1% Gel 2 Gram(s) Topical two times a day PRN shoulder pain  ibuprofen  Tablet. 400 milliGRAM(s) Oral three times a day PRN Moderate Pain (4 - 6)  oxycodone    5 mG/acetaminophen 325 mG 1 Tablet(s) Oral every 4 hours PRN Severe Pain (7 - 10)

## 2023-02-28 NOTE — PROGRESS NOTE ADULT - ASSESSMENT
This is a 89-year-old woman with hypertension, glaucoma, arthritis, presents to [facility] ED on 2/21 with c/o worsened low back pain and difficulty ambulating for the past 2-3 days s/p falls x 2 at home about 2.5 weeks apart. She was found to have acute, nondisplaced, minimally impacted sacral fracture, no surgical intervention. Patient now with gait Instability, ADL impairments and Functional impairments.    #Sacral Fracture  - No surgical intervention  - Comprehensive Rehab Program: PT/OT, 3hours daily and 5 days weekly  - PT: Focused on improving strength, endurance, coordination, balance, functional mobility, and transfers  - OT: Focused on improving strength, fine motor skills, coordination, posture and ADLs.    - WB Status: WBAT  management- percoset PRN, add lidoderm, ICE- low dose gabapentin started    #HTN  - Amlodipine 5mg daily    #Cellulitis  - Keflex 500mg q 12 hrs  - Topical Triamcinolone BID to lower legs with Mupirocin PRN to open wounds    #Glaucoma  - Xalatan opthal    HELDER  BUN 38>29 improved  encourage PO fluids    #Pain management  - Tylenol PRN, ibuprofen, Oxycodone PRN, [id number] 3x/day added    #DVT ppx  - Lovenox,     #Bowel Regimen  - Senna, miralax   last BM 2/24- increase miralax 2x/day, add dulcolax suppos PRN    #Bladder management  - BS on admission, and q 8 hours (SC if > 400)  - Monitor UO    #FEN   - Diet: Renal   - Supplements: oscal D     #Skin:  - Skin on admission: intact  - Pressure injury/Skin: Turn Q2hrs while in bed, OOB to Chair, PT/OT     #Sleep:   - Maintain quiet hours and low stim environment.  - Melatonin PRN to maximize participation in therapy during the day.       #Precaution  - Fall, Aspiration    #GOC  CODE STATUS: FULL CODE

## 2023-02-28 NOTE — PROGRESS NOTE ADULT - SUBJECTIVE AND OBJECTIVE BOX
Patient is a 89y old  Female who presents with a chief complaint of sacral fracture (28 Feb 2023 11:49)      HPI:  This is a 89-year-old woman with hypertension, glaucoma, arthritis, presents to [facility] ED on 2/21 with c/o worsened low back pain and difficulty ambulating for the past 2-3 days.  Pt lives alone and at baseline, walks independently, with no assistive devices.  Pt states she fell on her buttocks at home, about 2 weeks prior and she did see ortho.  Pt stated the pain did improve after a week, also take Tylenol prn.  However, she fell again about 3 days prior to presentataion.  Pt was okay. until for the past 2 days, low back pain has worsened, even with minimal movement.  Pt also reports pain not relieved with Tylenol. pt started taking Advil, but last time was yesterday.  She has difficulty standing and walking around due to the low back pain. She denied numbness, tingling, she is able to move all her extremities.  She denies fever, chills, chest pain, headache, dizziness, cough, dyspnea, dysuria.  She had nml BM and urination.   CT head neg for acute hemorrhage, extra-axial collection or displaced calvarial fracture.  Cervical spine CT showed no acute fracture or traumatic subluxation. Lumbar spine CT showed bilateral sacral germania insufficiency fractures. Multilevel degenerative changes of the lumbar spine. She was seen by ortho for acute, nondisplaced, minimally impacted sacral fracture, no surgical intervention, WBAT.      (24 Feb 2023 13:22)        SUBJECTIVE/OVERNIGHT EVENTS:  Patient was seen and examined at bedside this morning.  She reports that she slept well  States that since her fall she has had pain shooting in left leg at times, as well as pins and needles/burning sensation in bilateral feet. Reports slight improvement with initiation of gabapentin yesterday.   Patient notes that she has continued sacral pain. Per therapy, limited by pain and not tolerating certain movement and positions.  Patient denies chest pain, sob, fever, chills, nausea, vomiting, diarrhea.   Last bowel movement was 2/24; patient amenable to suppository.      PAST MEDICAL & SURGICAL HISTORY:  Hypertension      History of total knee replacement  right      History of total hip arthroplasty  right with pinning      History of hip replacement, total, left          Allergies    No Known Allergies    Intolerances          MEDICATIONS  (STANDING):  amLODIPine   Tablet 5 milliGRAM(s) Oral daily  calcium carbonate 1250 mG  + Vitamin D (OsCal 500 + D) 1 Tablet(s) Oral daily  cephalexin 500 milliGRAM(s) Oral every 12 hours  enoxaparin Injectable 40 milliGRAM(s) SubCutaneous every 24 hours  gabapentin 100 milliGRAM(s) Oral three times a day  latanoprost 0.005% Ophthalmic Solution 1 Drop(s) Both EYES every 24 hours  lidocaine   4% Patch 1 Patch Transdermal <User Schedule>  lidocaine   4% Patch 1 Patch Transdermal daily  multivitamin 1 Tablet(s) Oral daily  mupirocin 2% Ointment 1 Application(s) Topical two times a day  polyethylene glycol 3350 17 Gram(s) Oral two times a day  senna 2 Tablet(s) Oral at bedtime  triamcinolone 0.1% Ointment 1 Application(s) Topical two times a day  vitamin A &amp; D Ointment 1 Application(s) Topical daily    MEDICATIONS  (PRN):  acetaminophen     Tablet .. 650 milliGRAM(s) Oral every 6 hours PRN Temp greater or equal to 38C (100.4F), Mild Pain (1 - 3)  bisacodyl Suppository 10 milliGRAM(s) Rectal daily PRN Constipation  diclofenac sodium 1% Gel 2 Gram(s) Topical two times a day PRN shoulder pain  ibuprofen  Tablet. 400 milliGRAM(s) Oral three times a day PRN Moderate Pain (4 - 6)  oxycodone    5 mG/acetaminophen 325 mG 1 Tablet(s) Oral every 4 hours PRN Severe Pain (7 - 10)        RECENT LABS:                        11.1   9.08  )-----------( 355      ( 27 Feb 2023 06:30 )             35.2     02-27    135  |  99  |  29<H>  ----------------------------<  89  4.1   |  29  |  0.75    Ca    9.0      27 Feb 2023 06:30    TPro  7.4  /  Alb  2.5<L>  /  TBili  0.4  /  DBili  x   /  AST  29  /  ALT  25  /  AlkPhos  144<H>  02-27        CAPILLARY BLOOD GLUCOSE            VITALS  Vital Signs Last 24 Hrs  T(C): 36.7 (28 Feb 2023 08:04), Max: 36.7 (28 Feb 2023 08:04)  T(F): 98.1 (28 Feb 2023 08:04), Max: 98.1 (28 Feb 2023 08:04)  HR: 91 (28 Feb 2023 08:04) (77 - 91)  BP: 115/69 (28 Feb 2023 08:04) (101/56 - 115/69)  BP(mean): --  RR: 16 (28 Feb 2023 08:04) (16 - 16)  SpO2: 93% (28 Feb 2023 08:04) (93% - 96%)    Parameters below as of 28 Feb 2023 08:04  Patient On (Oxygen Delivery Method): room air          PHYSICAL EXAM:  General: NAD, Resting Comfortable,                                  HEENT: NC/AT, EOM I, PERRLA, Normal Conjunctivae  Cardio: RRR, Normal S1-S2, No M/G/R                              Pulm: No Respiratory Distress,  Lungs CTAB                        Abdomen: ND/NT, Soft, BS+                                                MSK: No joint swelling, Right shoulder Frozen                                        Ext: 1+ edema, No calf tenderness    Skin:  Left lower leg with scabs, erythema, sl warmth, no drainage- stable                                                                Neuro - AAOx3, SILT, LLE eversion/DF weakness      # Case discussed at IDT rounds 2/28    SW: Patient lives alone in a private home  Family lives in [place], though patient has friends nearby who are able to help her.   1 ANDIE with 1F setup inside    OT: Limited at times by pain  UBD/LBD- Min/Total  Transfers - Total A    PT:  Amb- 8 steps with RW and WC follow Min-Mod assist  Transfers- Min-Mod assist    Discharge Planning- 3/7 EVAN.

## 2023-03-01 PROCEDURE — 99232 SBSQ HOSP IP/OBS MODERATE 35: CPT

## 2023-03-01 PROCEDURE — 99232 SBSQ HOSP IP/OBS MODERATE 35: CPT | Mod: GC

## 2023-03-01 RX ADMIN — LIDOCAINE 1 PATCH: 4 CREAM TOPICAL at 19:21

## 2023-03-01 RX ADMIN — LATANOPROST 1 DROP(S): 0.05 SOLUTION/ DROPS OPHTHALMIC; TOPICAL at 21:24

## 2023-03-01 RX ADMIN — GABAPENTIN 100 MILLIGRAM(S): 400 CAPSULE ORAL at 05:10

## 2023-03-01 RX ADMIN — Medication 1 TABLET(S): at 12:00

## 2023-03-01 RX ADMIN — LIDOCAINE 1 PATCH: 4 CREAM TOPICAL at 07:58

## 2023-03-01 RX ADMIN — Medication 500 MILLIGRAM(S): at 18:21

## 2023-03-01 RX ADMIN — GABAPENTIN 100 MILLIGRAM(S): 400 CAPSULE ORAL at 13:17

## 2023-03-01 RX ADMIN — POLYETHYLENE GLYCOL 3350 17 GRAM(S): 17 POWDER, FOR SOLUTION ORAL at 18:22

## 2023-03-01 RX ADMIN — ENOXAPARIN SODIUM 40 MILLIGRAM(S): 100 INJECTION SUBCUTANEOUS at 12:00

## 2023-03-01 RX ADMIN — Medication 1 APPLICATION(S): at 05:09

## 2023-03-01 RX ADMIN — GABAPENTIN 100 MILLIGRAM(S): 400 CAPSULE ORAL at 21:24

## 2023-03-01 RX ADMIN — Medication 500 MILLIGRAM(S): at 05:09

## 2023-03-01 RX ADMIN — MUPIROCIN 1 APPLICATION(S): 20 OINTMENT TOPICAL at 18:21

## 2023-03-01 RX ADMIN — Medication 1 APPLICATION(S): at 11:59

## 2023-03-01 RX ADMIN — SENNA PLUS 2 TABLET(S): 8.6 TABLET ORAL at 21:25

## 2023-03-01 RX ADMIN — MUPIROCIN 1 APPLICATION(S): 20 OINTMENT TOPICAL at 05:10

## 2023-03-01 RX ADMIN — Medication 1 APPLICATION(S): at 18:21

## 2023-03-01 RX ADMIN — Medication 400 MILLIGRAM(S): at 21:24

## 2023-03-01 RX ADMIN — Medication 400 MILLIGRAM(S): at 22:20

## 2023-03-01 RX ADMIN — AMLODIPINE BESYLATE 5 MILLIGRAM(S): 2.5 TABLET ORAL at 05:09

## 2023-03-01 NOTE — PROGRESS NOTE ADULT - SUBJECTIVE AND OBJECTIVE BOX
Patient is a 89y old  Female who presents with a chief complaint of sacral fracture     Patient seen and examined at bedside. No overnight events reported.   no n/v, no sob, no new complaints    Vital Signs Last 24 Hrs  T(C): 36.6 (01 Mar 2023 08:52), Max: 36.6 (28 Feb 2023 20:19)  T(F): 97.9 (01 Mar 2023 08:52), Max: 97.9 (01 Mar 2023 08:52)  HR: 68 (01 Mar 2023 08:52) (68 - 74)  BP: 111/59 (01 Mar 2023 08:52) (111/59 - 127/72)  BP(mean): --  RR: 16 (01 Mar 2023 08:52) (16 - 16)  SpO2: 96% (01 Mar 2023 08:52) (96% - 96%)    Parameters below as of 01 Mar 2023 08:52  Patient On (Oxygen Delivery Method): room air      GENERAL- NAD  EAR/NOSE/MOUTH/THROAT - no pharyngeal exudates, no oral lesion's  MMM  EYES- JUSTIN, conjunctiva and Sclera clear  NECK- supple  RESPIRATORY-  clear to auscultation bilaterally, non laboured breathing  CARDIOVASCULAR - SIS2, RRR  GI - soft NT BS present  EXTREMITIES- no pedal edema  NEUROLOGY- no gross focal deficits  PSYCHIATRY- AAO X 3                    11.1                 135  | 29   | 29           9.08  >-----------< 355     ------------------------< 89                    35.2                 4.1  | 99   | 0.75                                         Ca 9.0   Mg x     Ph x          MEDICATIONS  (STANDING):  amlodipine   Tablet 5 milliGRAM(s) Oral daily  calcium carbonate 1250 mG  + Vitamin D (OsCal 500 + D) 1 Tablet(s) Oral daily  cephalexin 500 milliGRAM(s) Oral every 12 hours  enoxaparin Injectable 40 milliGRAM(s) SubCutaneous every 24 hours  gabapentin 100 milliGRAM(s) Oral three times a day  latanoprost 0.005% Ophthalmic Solution 1 Drop(s) Both EYES every 24 hours  lidocaine   4% Patch 1 Patch Transdermal <User Schedule>  lidocaine   4% Patch 1 Patch Transdermal daily  multivitamin 1 Tablet(s) Oral daily  mupirocin 2% Ointment 1 Application(s) Topical two times a day  polyethylene glycol 3350 17 Gram(s) Oral two times a day  senna 2 Tablet(s) Oral at bedtime  triamcinolone 0.1% Ointment 1 Application(s) Topical two times a day  vitamin A &amp; D Ointment 1 Application(s) Topical daily    MEDICATIONS  (PRN):  acetaminophen     Tablet .. 650 milliGRAM(s) Oral every 6 hours PRN Temp greater or equal to 38C (100.4F), Mild Pain (1 - 3)  bisacodyl Suppository 10 milliGRAM(s) Rectal daily PRN Constipation  diclofenac sodium 1% Gel 2 Gram(s) Topical two times a day PRN shoulder pain  ibuprofen  Tablet. 400 milliGRAM(s) Oral three times a day PRN Moderate Pain (4 - 6)  oxycodone    5 mG/acetaminophen 325 mG 1 Tablet(s) Oral every 4 hours PRN Severe Pain (7 - 10)

## 2023-03-01 NOTE — PROGRESS NOTE ADULT - SUBJECTIVE AND OBJECTIVE BOX
Patient is a 89y old  Female who presents with a chief complaint of sacral fracture (28 Feb 2023 11:49)      HPI:  This is a 89-year-old woman with hypertension, glaucoma, arthritis, presents to [facility] ED on 2/21 with c/o worsened low back pain and difficulty ambulating for the past 2-3 days.  Pt lives alone and at baseline, walks independently, with no assistive devices.  Pt states she fell on her buttocks at home, about 2 weeks prior and she did see ortho.  Pt stated the pain did improve after a week, also take Tylenol prn.  However, she fell again about 3 days prior to presentataion.  Pt was okay. until for the past 2 days, low back pain has worsened, even with minimal movement.  Pt also reports pain not relieved with Tylenol. pt started taking Advil, but last time was yesterday.  She has difficulty standing and walking around due to the low back pain. She denied numbness, tingling, she is able to move all her extremities.  She denies fever, chills, chest pain, headache, dizziness, cough, dyspnea, dysuria.  She had nml BM and urination.   CT head neg for acute hemorrhage, extra-axial collection or displaced calvarial fracture.  Cervical spine CT showed no acute fracture or traumatic subluxation. Lumbar spine CT showed bilateral sacral germania insufficiency fractures. Multilevel degenerative changes of the lumbar spine. She was seen by ortho for acute, nondisplaced, minimally impacted sacral fracture, no surgical intervention, WBAT.      (24 Feb 2023 13:22)        SUBJECTIVE/OVERNIGHT EVENTS/ROS:  Patient was seen and examined at bedside this morning.  She reports that she slept well  resolution of burning pain in feet  Initial PVR this AM 440cc, voided again- PVR 5cc  left leg symptoms/reddness improving  reports low back pain with movement  Patient denies chest pain, sob, fever, chills, nausea, vomiting, diarrhea.   moved bowels 2/28      PAST MEDICAL & SURGICAL HISTORY:  Hypertension      History of total knee replacement  right      History of total hip arthroplasty  right with pinning      History of hip replacement, total, left          Allergies    No Known Allergies    Intolerances          MEDICATIONS  (STANDING):  amLODIPine   Tablet 5 milliGRAM(s) Oral daily  calcium carbonate 1250 mG  + Vitamin D (OsCal 500 + D) 1 Tablet(s) Oral daily  cephalexin 500 milliGRAM(s) Oral every 12 hours  enoxaparin Injectable 40 milliGRAM(s) SubCutaneous every 24 hours  gabapentin 100 milliGRAM(s) Oral three times a day  latanoprost 0.005% Ophthalmic Solution 1 Drop(s) Both EYES every 24 hours  lidocaine   4% Patch 1 Patch Transdermal <User Schedule>  lidocaine   4% Patch 1 Patch Transdermal daily  multivitamin 1 Tablet(s) Oral daily  mupirocin 2% Ointment 1 Application(s) Topical two times a day  polyethylene glycol 3350 17 Gram(s) Oral two times a day  senna 2 Tablet(s) Oral at bedtime  triamcinolone 0.1% Ointment 1 Application(s) Topical two times a day  vitamin A &amp; D Ointment 1 Application(s) Topical daily    MEDICATIONS  (PRN):  acetaminophen     Tablet .. 650 milliGRAM(s) Oral every 6 hours PRN Temp greater or equal to 38C (100.4F), Mild Pain (1 - 3)  bisacodyl Suppository 10 milliGRAM(s) Rectal daily PRN Constipation  diclofenac sodium 1% Gel 2 Gram(s) Topical two times a day PRN shoulder pain  ibuprofen  Tablet. 400 milliGRAM(s) Oral three times a day PRN Moderate Pain (4 - 6)  oxycodone    5 mG/acetaminophen 325 mG 1 Tablet(s) Oral every 4 hours PRN Severe Pain (7 - 10)          RECENT LABS:                        11.1   9.08  )-----------( 355      ( 27 Feb 2023 06:30 )             35.2     02-27    135  |  99  |  29<H>  ----------------------------<  89  4.1   |  29  |  0.75    Ca    9.0      27 Feb 2023 06:30    TPro  7.4  /  Alb  2.5<L>  /  TBili  0.4  /  DBili  x   /  AST  29  /  ALT  25  /  AlkPhos  144<H>  02-27        CAPILLARY BLOOD GLUCOSE            Vital Signs Last 24 Hrs  T(C): 36.6 (03-01-23 @ 08:52), Max: 36.6 (02-28-23 @ 20:19)  T(F): 97.9 (03-01-23 @ 08:52), Max: 97.9 (03-01-23 @ 08:52)  HR: 68 (03-01-23 @ 08:52) (68 - 74)  BP: 111/59 (03-01-23 @ 08:52) (111/59 - 127/72)  BP(mean): --  RR: 16 (03-01-23 @ 08:52) (16 - 16)  SpO2: 96% (03-01-23 @ 08:52) (96% - 96%)            PHYSICAL EXAM:  General: NAD, Resting Comfortable,                                  HEENT: NC/AT, EOM I, PERRLA, Normal Conjunctivae  Cardio: RRR, Normal S1-S2, No M/G/R                              Pulm: No Respiratory Distress,  Lungs CTAB                        Abdomen: ND/NT, Soft, BS+                                                MSK: No joint swelling, Right shoulder Frozen                                        Ext: 1+ edema, No calf tenderness    Skin:  Left lower leg with scabs, erythema, sl warmth, no drainage- stable                                                                Neuro - AAOx3, SILT, LLE eversion/DF weakness      # Case discussed at IDT rounds 2/28    SW: Patient lives alone in a private home  Family lives in [place], though patient has friends nearby who are able to help her.   1 ANDIE with 1F setup inside    OT: Limited at times by pain  UBD/LBD- Min/Total  Transfers - Total A    PT:  Amb- 8 steps with RW and WC follow Min-Mod assist  Transfers- Min-Mod assist    Discharge Planning- 3/7 EVAN.

## 2023-03-02 LAB
ALBUMIN SERPL ELPH-MCNC: 2.4 G/DL — LOW (ref 3.3–5)
ALP SERPL-CCNC: 143 U/L — HIGH (ref 40–120)
ALT FLD-CCNC: 22 U/L — SIGNIFICANT CHANGE UP (ref 10–45)
ANION GAP SERPL CALC-SCNC: 6 MMOL/L — SIGNIFICANT CHANGE UP (ref 5–17)
AST SERPL-CCNC: 30 U/L — SIGNIFICANT CHANGE UP (ref 10–40)
BILIRUB SERPL-MCNC: 0.2 MG/DL — SIGNIFICANT CHANGE UP (ref 0.2–1.2)
BUN SERPL-MCNC: 30 MG/DL — HIGH (ref 7–23)
CALCIUM SERPL-MCNC: 9.1 MG/DL — SIGNIFICANT CHANGE UP (ref 8.4–10.5)
CHLORIDE SERPL-SCNC: 101 MMOL/L — SIGNIFICANT CHANGE UP (ref 96–108)
CO2 SERPL-SCNC: 29 MMOL/L — SIGNIFICANT CHANGE UP (ref 22–31)
CREAT SERPL-MCNC: 0.83 MG/DL — SIGNIFICANT CHANGE UP (ref 0.5–1.3)
EGFR: 66 ML/MIN/1.73M2 — SIGNIFICANT CHANGE UP
GLUCOSE SERPL-MCNC: 97 MG/DL — SIGNIFICANT CHANGE UP (ref 70–99)
HCT VFR BLD CALC: 32.5 % — LOW (ref 34.5–45)
HGB BLD-MCNC: 10 G/DL — LOW (ref 11.5–15.5)
MCHC RBC-ENTMCNC: 27.2 PG — SIGNIFICANT CHANGE UP (ref 27–34)
MCHC RBC-ENTMCNC: 30.8 GM/DL — LOW (ref 32–36)
MCV RBC AUTO: 88.3 FL — SIGNIFICANT CHANGE UP (ref 80–100)
NRBC # BLD: 0 /100 WBCS — SIGNIFICANT CHANGE UP (ref 0–0)
PLATELET # BLD AUTO: 367 K/UL — SIGNIFICANT CHANGE UP (ref 150–400)
POTASSIUM SERPL-MCNC: 4.1 MMOL/L — SIGNIFICANT CHANGE UP (ref 3.5–5.3)
POTASSIUM SERPL-SCNC: 4.1 MMOL/L — SIGNIFICANT CHANGE UP (ref 3.5–5.3)
PROT SERPL-MCNC: 7.2 G/DL — SIGNIFICANT CHANGE UP (ref 6–8.3)
RBC # BLD: 3.68 M/UL — LOW (ref 3.8–5.2)
RBC # FLD: 15.7 % — HIGH (ref 10.3–14.5)
SODIUM SERPL-SCNC: 136 MMOL/L — SIGNIFICANT CHANGE UP (ref 135–145)
WBC # BLD: 7.24 K/UL — SIGNIFICANT CHANGE UP (ref 3.8–10.5)
WBC # FLD AUTO: 7.24 K/UL — SIGNIFICANT CHANGE UP (ref 3.8–10.5)

## 2023-03-02 PROCEDURE — 99232 SBSQ HOSP IP/OBS MODERATE 35: CPT | Mod: GC

## 2023-03-02 PROCEDURE — 99232 SBSQ HOSP IP/OBS MODERATE 35: CPT

## 2023-03-02 RX ADMIN — Medication 1 TABLET(S): at 11:44

## 2023-03-02 RX ADMIN — Medication 1 APPLICATION(S): at 14:13

## 2023-03-02 RX ADMIN — MUPIROCIN 1 APPLICATION(S): 20 OINTMENT TOPICAL at 17:38

## 2023-03-02 RX ADMIN — GABAPENTIN 100 MILLIGRAM(S): 400 CAPSULE ORAL at 05:36

## 2023-03-02 RX ADMIN — Medication 1 APPLICATION(S): at 17:39

## 2023-03-02 RX ADMIN — ENOXAPARIN SODIUM 40 MILLIGRAM(S): 100 INJECTION SUBCUTANEOUS at 11:44

## 2023-03-02 RX ADMIN — Medication 1 APPLICATION(S): at 05:36

## 2023-03-02 RX ADMIN — AMLODIPINE BESYLATE 5 MILLIGRAM(S): 2.5 TABLET ORAL at 05:36

## 2023-03-02 RX ADMIN — GABAPENTIN 100 MILLIGRAM(S): 400 CAPSULE ORAL at 14:13

## 2023-03-02 RX ADMIN — Medication 650 MILLIGRAM(S): at 09:30

## 2023-03-02 RX ADMIN — LATANOPROST 1 DROP(S): 0.05 SOLUTION/ DROPS OPHTHALMIC; TOPICAL at 21:31

## 2023-03-02 RX ADMIN — Medication 500 MILLIGRAM(S): at 05:36

## 2023-03-02 RX ADMIN — LIDOCAINE 1 PATCH: 4 CREAM TOPICAL at 06:50

## 2023-03-02 RX ADMIN — MUPIROCIN 1 APPLICATION(S): 20 OINTMENT TOPICAL at 05:36

## 2023-03-02 RX ADMIN — Medication 650 MILLIGRAM(S): at 08:44

## 2023-03-02 RX ADMIN — LIDOCAINE 1 PATCH: 4 CREAM TOPICAL at 18:08

## 2023-03-02 RX ADMIN — GABAPENTIN 100 MILLIGRAM(S): 400 CAPSULE ORAL at 21:30

## 2023-03-02 RX ADMIN — SENNA PLUS 2 TABLET(S): 8.6 TABLET ORAL at 21:30

## 2023-03-02 RX ADMIN — LIDOCAINE 1 PATCH: 4 CREAM TOPICAL at 07:28

## 2023-03-02 NOTE — PROGRESS NOTE ADULT - SUBJECTIVE AND OBJECTIVE BOX
Patient is a 89y old  Female who presents with a chief complaint of sacral fracture     Patient seen and examined at bedside. No overnight events reported.   no n/v, no sob, no new complaints    Vital Signs Last 24 Hrs  T(C): 36.3 (02 Mar 2023 07:45), Max: 36.3 (02 Mar 2023 07:45)  T(F): 97.4 (02 Mar 2023 07:45), Max: 97.4 (02 Mar 2023 07:45)  HR: 67 (02 Mar 2023 07:45) (66 - 67)  BP: 130/73 (02 Mar 2023 07:45) (127/70 - 130/73)  BP(mean): --  RR: 16 (02 Mar 2023 07:45) (16 - 16)  SpO2: 96% (02 Mar 2023 07:45) (96% - 96%)    Parameters below as of 02 Mar 2023 07:45  Patient On (Oxygen Delivery Method): room air      GENERAL- NAD  EAR/NOSE/MOUTH/THROAT - no pharyngeal exudates, no oral lesion's  MMM  EYES- JUSTIN, conjunctiva and Sclera clear  NECK- supple  RESPIRATORY-  clear to auscultation bilaterally, non laboured breathing  CARDIOVASCULAR - SIS2, RRR  GI - soft NT BS present  EXTREMITIES- no pedal edema  NEUROLOGY- no gross focal deficits  PSYCHIATRY- AAO X 3                  10.0                 136  | 29   | 30           7.24  >-----------< 367     ------------------------< 97                    32.5                 4.1  | 101  | 0.83                                         Ca 9.1   Mg x     Ph x              MEDICATIONS  (STANDING):  amLODIPine   Tablet 5 milliGRAM(s) Oral daily  calcium carbonate 1250 mG  + Vitamin D (OsCal 500 + D) 1 Tablet(s) Oral daily  cephalexin 500 milliGRAM(s) Oral every 12 hours  enoxaparin Injectable 40 milliGRAM(s) SubCutaneous every 24 hours  gabapentin 100 milliGRAM(s) Oral three times a day  latanoprost 0.005% Ophthalmic Solution 1 Drop(s) Both EYES every 24 hours  lidocaine   4% Patch 1 Patch Transdermal <User Schedule>  lidocaine   4% Patch 1 Patch Transdermal daily  multivitamin 1 Tablet(s) Oral daily  mupirocin 2% Ointment 1 Application(s) Topical two times a day  polyethylene glycol 3350 17 Gram(s) Oral two times a day  senna 2 Tablet(s) Oral at bedtime  triamcinolone 0.1% Ointment 1 Application(s) Topical two times a day  vitamin A &amp; D Ointment 1 Application(s) Topical daily    MEDICATIONS  (PRN):  acetaminophen     Tablet .. 650 milliGRAM(s) Oral every 6 hours PRN Temp greater or equal to 38C (100.4F), Mild Pain (1 - 3)  bisacodyl Suppository 10 milliGRAM(s) Rectal daily PRN Constipation  diclofenac sodium 1% Gel 2 Gram(s) Topical two times a day PRN shoulder pain  ibuprofen  Tablet. 400 milliGRAM(s) Oral three times a day PRN Moderate Pain (4 - 6)  oxycodone    5 mG/acetaminophen 325 mG 1 Tablet(s) Oral every 4 hours PRN Severe Pain (7 - 10)    MEDICATIONS  (STANDING):  amlodipine   Tablet 5 milliGRAM(s) Oral daily  calcium carbonate 1250 mG  + Vitamin D (OsCal 500 + D) 1 Tablet(s) Oral daily  cephalexin 500 milliGRAM(s) Oral every 12 hours  enoxaparin Injectable 40 milliGRAM(s) SubCutaneous every 24 hours  gabapentin 100 milliGRAM(s) Oral three times a day  latanoprost 0.005% Ophthalmic Solution 1 Drop(s) Both EYES every 24 hours  lidocaine   4% Patch 1 Patch Transdermal <User Schedule>  lidocaine   4% Patch 1 Patch Transdermal daily  multivitamin 1 Tablet(s) Oral daily  mupirocin 2% Ointment 1 Application(s) Topical two times a day  polyethylene glycol 3350 17 Gram(s) Oral two times a day  senna 2 Tablet(s) Oral at bedtime  triamcinolone 0.1% Ointment 1 Application(s) Topical two times a day  vitamin A &amp; D Ointment 1 Application(s) Topical daily    MEDICATIONS  (PRN):  acetaminophen     Tablet .. 650 milliGRAM(s) Oral every 6 hours PRN Temp greater or equal to 38C (100.4F), Mild Pain (1 - 3)  bisacodyl Suppository 10 milliGRAM(s) Rectal daily PRN Constipation  diclofenac sodium 1% Gel 2 Gram(s) Topical two times a day PRN shoulder pain  ibuprofen  Tablet. 400 milliGRAM(s) Oral three times a day PRN Moderate Pain (4 - 6)  oxycodone    5 mG/acetaminophen 325 mG 1 Tablet(s) Oral every 4 hours PRN Severe Pain (7 - 10)

## 2023-03-02 NOTE — PROGRESS NOTE ADULT - SUBJECTIVE AND OBJECTIVE BOX
Patient is a 89y old  Female who presents with a chief complaint of sacral fracture (02 Mar 2023 10:28)      HPI:  This is a 89-year-old woman with hypertension, glaucoma, arthritis, presents to [facility] ED on 2/21 with c/o worsened low back pain and difficulty ambulating for the past 2-3 days.  Pt lives alone and at baseline, walks independently, with no assistive devices.  Pt states she fell on her buttocks at home, about 2 weeks prior and she did see ortho.  Pt stated the pain did improve after a week, also take Tylenol prn.  However, she fell again about 3 days prior to presentataion.  Pt was okay. until for the past 2 days, low back pain has worsened, even with minimal movement.  Pt also reports pain not relieved with Tylenol. pt started taking Advil, but last time was yesterday.  She has difficulty standing and walking around due to the low back pain. She denied numbness, tingling, she is able to move all her extremities.  She denies fever, chills, chest pain, headache, dizziness, cough, dyspnea, dysuria.  She had nml BM and urination.   CT head neg for acute hemorrhage, extra-axial collection or displaced calvarial fracture.  Cervical spine CT showed no acute fracture or traumatic subluxation. Lumbar spine CT showed bilateral sacral germania insufficiency fractures. Multilevel degenerative changes of the lumbar spine. She was seen by ortho for acute, nondisplaced, minimally impacted sacral fracture, no surgical intervention, WBAT.      (24 Feb 2023 13:22)        SUBJECTIVE/OVERNIGHT EVENTS:  Patient was seen and examined at bedside this morning. Patient's friend was updated at bedside.  She reports feeling well this morning. Pain/dysesthesias in the feet are better today. She is still having some intermittent back pain.  Patient denies fever, chills, chest pain, SOB. Denies nausea, vomiting, constipation, abdominal pain. Had a bowel movement this morning in the bathroom.  Discussed plan for [facility] at [facility] and safety measures upon discharge from rehab.      PAST MEDICAL & SURGICAL HISTORY:  Hypertension      History of total knee replacement  right      History of total hip arthroplasty  right with pinning      History of hip replacement, total, left          Allergies    No Known Allergies    Intolerances          MEDICATIONS  (STANDING):  amLODIPine   Tablet 5 milliGRAM(s) Oral daily  calcium carbonate 1250 mG  + Vitamin D (OsCal 500 + D) 1 Tablet(s) Oral daily  cephalexin 500 milliGRAM(s) Oral every 12 hours  enoxaparin Injectable 40 milliGRAM(s) SubCutaneous every 24 hours  gabapentin 100 milliGRAM(s) Oral three times a day  latanoprost 0.005% Ophthalmic Solution 1 Drop(s) Both EYES every 24 hours  lidocaine   4% Patch 1 Patch Transdermal <User Schedule>  lidocaine   4% Patch 1 Patch Transdermal daily  multivitamin 1 Tablet(s) Oral daily  mupirocin 2% Ointment 1 Application(s) Topical two times a day  polyethylene glycol 3350 17 Gram(s) Oral two times a day  senna 2 Tablet(s) Oral at bedtime  triamcinolone 0.1% Ointment 1 Application(s) Topical two times a day  vitamin A &amp; D Ointment 1 Application(s) Topical daily    MEDICATIONS  (PRN):  acetaminophen     Tablet .. 650 milliGRAM(s) Oral every 6 hours PRN Temp greater or equal to 38C (100.4F), Mild Pain (1 - 3)  bisacodyl Suppository 10 milliGRAM(s) Rectal daily PRN Constipation  diclofenac sodium 1% Gel 2 Gram(s) Topical two times a day PRN shoulder pain  ibuprofen  Tablet. 400 milliGRAM(s) Oral three times a day PRN Moderate Pain (4 - 6)  oxycodone    5 mG/acetaminophen 325 mG 1 Tablet(s) Oral every 4 hours PRN Severe Pain (7 - 10)        RECENT LABS:                        10.0   7.24  )-----------( 367      ( 02 Mar 2023 06:30 )             32.5     03-02    136  |  101  |  30<H>  ----------------------------<  97  4.1   |  29  |  0.83    Ca    9.1      02 Mar 2023 06:30    TPro  7.2  /  Alb  2.4<L>  /  TBili  0.2  /  DBili  x   /  AST  30  /  ALT  22  /  AlkPhos  143<H>  03-02        CAPILLARY BLOOD GLUCOSE            VITALS  Vital Signs Last 24 Hrs  T(C): 36.3 (02 Mar 2023 07:45), Max: 36.3 (02 Mar 2023 07:45)  T(F): 97.4 (02 Mar 2023 07:45), Max: 97.4 (02 Mar 2023 07:45)  HR: 67 (02 Mar 2023 07:45) (66 - 67)  BP: 130/73 (02 Mar 2023 07:45) (127/70 - 130/73)  BP(mean): --  RR: 16 (02 Mar 2023 07:45) (16 - 16)  SpO2: 96% (02 Mar 2023 07:45) (96% - 96%)    Parameters below as of 02 Mar 2023 07:45  Patient On (Oxygen Delivery Method): room air          PHYSICAL EXAM:  General: NAD, Resting Comfortable,                                  HEENT: NC/AT, EOM I, PERRLA, Normal Conjunctivae  Cardio: RRR, Normal S1-S2, No M/G/R                              Pulm: No Respiratory Distress,  Lungs CTAB                        Abdomen: ND/NT, Soft, BS+                                                MSK: No joint swelling, Right shoulder Frozen                                        Ext: 1+ edema, No calf tenderness    Skin:  Left lower leg with scabs, erythema, sl warmth, no drainage- stable                                                                Neuro - AAOx3, SILT, LLE eversion/DF weakness      # Case discussed at IDT rounds 2/28    SW: Patient lives alone in a private home  Family lives in [place], though patient has friends nearby who are able to help her.   1 ANDIE with 1F setup inside    OT: Limited at times by pain  UBD/LBD- Min/Total  Transfers - Total A    PT:  Amb- 8 steps with RW and WC follow Min-Mod assist  Transfers- Min-Mod assist    Discharge Planning- 3/7 EVAN.   Patient is a 89y old  Female who presents with a chief complaint of sacral fracture (02 Mar 2023 10:28)      HPI:  This is a 89-year-old woman with hypertension, glaucoma, arthritis, presents to [facility] ED on 2/21 with c/o worsened low back pain and difficulty ambulating for the past 2-3 days.  Pt lives alone and at baseline, walks independently, with no assistive devices.  Pt states she fell on her buttocks at home, about 2 weeks prior and she did see ortho.  Pt stated the pain did improve after a week, also take Tylenol prn.  However, she fell again about 3 days prior to presentataion.  Pt was okay. until for the past 2 days, low back pain has worsened, even with minimal movement.  Pt also reports pain not relieved with Tylenol. pt started taking Advil, but last time was yesterday.  She has difficulty standing and walking around due to the low back pain. She denied numbness, tingling, she is able to move all her extremities.  She denies fever, chills, chest pain, headache, dizziness, cough, dyspnea, dysuria.  She had nml BM and urination.   CT head neg for acute hemorrhage, extra-axial collection or displaced calvarial fracture.  Cervical spine CT showed no acute fracture or traumatic subluxation. Lumbar spine CT showed bilateral sacral germania insufficiency fractures. Multilevel degenerative changes of the lumbar spine. She was seen by ortho for acute, nondisplaced, minimally impacted sacral fracture, no surgical intervention, WBAT.      (24 Feb 2023 13:22)        SUBJECTIVE/OVERNIGHT EVENTS/ROS:  Patient was seen and examined at bedside this morning. Patient's friend was updated at bedside.  She reports feeling well this morning. Pain/dysesthesias in the feet are better today. She is still having some intermittent back pain.  Patient denies fever, chills, chest pain, SOB. Denies nausea, vomiting, constipation, abdominal pain. Had a bowel movement this morning in the bathroom.  Discussed plan for [facility] at [facility] and safety measures upon discharge from rehab.      PAST MEDICAL & SURGICAL HISTORY:  Hypertension      History of total knee replacement  right      History of total hip arthroplasty  right with pinning      History of hip replacement, total, left          Allergies    No Known Allergies    Intolerances          MEDICATIONS  (STANDING):  amLODIPine   Tablet 5 milliGRAM(s) Oral daily  calcium carbonate 1250 mG  + Vitamin D (OsCal 500 + D) 1 Tablet(s) Oral daily  cephalexin 500 milliGRAM(s) Oral every 12 hours  enoxaparin Injectable 40 milliGRAM(s) SubCutaneous every 24 hours  gabapentin 100 milliGRAM(s) Oral three times a day  latanoprost 0.005% Ophthalmic Solution 1 Drop(s) Both EYES every 24 hours  lidocaine   4% Patch 1 Patch Transdermal <User Schedule>  lidocaine   4% Patch 1 Patch Transdermal daily  multivitamin 1 Tablet(s) Oral daily  mupirocin 2% Ointment 1 Application(s) Topical two times a day  polyethylene glycol 3350 17 Gram(s) Oral two times a day  senna 2 Tablet(s) Oral at bedtime  triamcinolone 0.1% Ointment 1 Application(s) Topical two times a day  vitamin A &amp; D Ointment 1 Application(s) Topical daily    MEDICATIONS  (PRN):  acetaminophen     Tablet .. 650 milliGRAM(s) Oral every 6 hours PRN Temp greater or equal to 38C (100.4F), Mild Pain (1 - 3)  bisacodyl Suppository 10 milliGRAM(s) Rectal daily PRN Constipation  diclofenac sodium 1% Gel 2 Gram(s) Topical two times a day PRN shoulder pain  ibuprofen  Tablet. 400 milliGRAM(s) Oral three times a day PRN Moderate Pain (4 - 6)  oxycodone    5 mG/acetaminophen 325 mG 1 Tablet(s) Oral every 4 hours PRN Severe Pain (7 - 10)        RECENT LABS:                        10.0   7.24  )-----------( 367      ( 02 Mar 2023 06:30 )             32.5     03-02    136  |  101  |  30<H>  ----------------------------<  97  4.1   |  29  |  0.83    Ca    9.1      02 Mar 2023 06:30    TPro  7.2  /  Alb  2.4<L>  /  TBili  0.2  /  DBili  x   /  AST  30  /  ALT  22  /  AlkPhos  143<H>  03-02        CAPILLARY BLOOD GLUCOSE            VITALS  Vital Signs Last 24 Hrs  T(C): 36.3 (02 Mar 2023 07:45), Max: 36.3 (02 Mar 2023 07:45)  T(F): 97.4 (02 Mar 2023 07:45), Max: 97.4 (02 Mar 2023 07:45)  HR: 67 (02 Mar 2023 07:45) (66 - 67)  BP: 130/73 (02 Mar 2023 07:45) (127/70 - 130/73)  BP(mean): --  RR: 16 (02 Mar 2023 07:45) (16 - 16)  SpO2: 96% (02 Mar 2023 07:45) (96% - 96%)    Parameters below as of 02 Mar 2023 07:45  Patient On (Oxygen Delivery Method): room air          PHYSICAL EXAM:  General: NAD, Resting Comfortable,                                  HEENT: NC/AT, EOM I, PERRLA, Normal Conjunctivae  Cardio: RRR, Normal S1-S2, No M/G/R                              Pulm: No Respiratory Distress,  Lungs CTAB                        Abdomen: ND/NT, Soft, BS+                                                MSK: No joint swelling, Right shoulder Frozen                                        Ext: 1+ edema, No calf tenderness    Skin:  Left lower leg with scabs, erythema, sl warmth, no drainage- stable                                                                Neuro - AAOx3, SILT, LLE eversion/DF weakness      # Case discussed at IDT rounds 2/28    SW: Patient lives alone in a private home  Family lives in [place], though patient has friends nearby who are able to help her.   1 ANDIE with 1F setup inside    OT: Limited at times by pain  UBD/LBD- Min/Total  Transfers - Total A    PT:  Amb- 8 steps with RW and WC follow Min-Mod assist  Transfers- Min-Mod assist    Discharge Planning- 3/7 [facility].

## 2023-03-02 NOTE — PROGRESS NOTE ADULT - ASSESSMENT
This is a 89-year-old woman with hypertension, glaucoma, arthritis, presents to [facility] ED on 2/21 with c/o worsened low back pain and difficulty ambulating for the past 2-3 days s/p falls x 2 at home about 2.5 weeks apart. She was found to have acute, nondisplaced, minimally impacted sacral fracture, no surgical intervention. Patient now with gait Instability, ADL impairments and Functional impairments.    #Sacral Fracture  - No surgical intervention  - Comprehensive Rehab Program: PT/OT, 3hours daily and 5 days weekly  - PT: Focused on improving strength, endurance, coordination, balance, functional mobility, and transfers  - OT: Focused on improving strength, fine motor skills, coordination, posture and ADLs.    - WB Status: WBAT  management- percoset PRN, add lidoderm, ICE- low dose gabapentin started- ongoing improvement in LE burning sensation     #HTN  - Amlodipine 5mg daily    #Cellulitis  - Keflex 500mg q 12 hrs  - Topical Triamcinolone BID to lower legs with Mupirocin PRN to open wounds    #Glaucoma  - Xalatan opthal    HELDER  BUN 38>29 improved  encourage PO fluids  check labs in AM    #Pain management  - Tylenol PRN, ibuprofen, Oxycodone PRN, [id number] 3x/day added    #DVT ppx  - Lovenox,     #Bowel Regimen  - Senna, miralax   Moved Bowels 3/2    #Bladder management  - BS on admission, and q 8 hours (SC if > 400)  - Monitor UO    #FEN   - Diet: Renal   - Supplements: oscal D     #Skin:  - Skin on admission: intact  - Pressure injury/Skin: Turn Q2hrs while in bed, OOB to Chair, PT/OT     #Sleep:   - Maintain quiet hours and low stim environment.  - Melatonin PRN to maximize participation in therapy during the day.       #Precaution  - Fall, Aspiration    #GOC  CODE STATUS: FULL CODE  This is a 89-year-old woman with hypertension, glaucoma, arthritis, presents to [facility] ED on 2/21 with c/o worsened low back pain and difficulty ambulating for the past 2-3 days s/p falls x 2 at home about 2.5 weeks apart. She was found to have acute, nondisplaced, minimally impacted sacral fracture, no surgical intervention. Patient now with gait Instability, ADL impairments and Functional impairments.    #Sacral Fracture  - No surgical intervention  - Comprehensive Rehab Program: PT/OT, 3hours daily and 5 days weekly  - PT: Focused on improving strength, endurance, coordination, balance, functional mobility, and transfers  - OT: Focused on improving strength, fine motor skills, coordination, posture and ADLs.    - WB Status: WBAT  management- percoset PRN, add lidoderm, ICE- low dose gabapentin started- ongoing improvement in LE burning sensation     #HTN  - Amlodipine 5mg daily    #Cellulitis  - Keflex 500mg q 12 hrs- finished 7 days - to DC  - Topical Triamcinolone BID to lower legs with Mupirocin PRN to open wounds    #Glaucoma  - Xalatan opthal    HELDER  BUN 38>29 >30 3/2  encourage PO fluids      #Pain management  - Tylenol PRN, ibuprofen, Oxycodone PRN, [id number] 3x/day added    #DVT ppx  - Lovenox,     #Bowel Regimen  - Senna, miralax   Moved Bowels 3/2    #Bladder management  - BS on admission, and q 8 hours (SC if > 400)  - Monitor UO    #FEN   - Diet: Renal   - Supplements: oscal D     #Skin:  - Skin on admission: intact  - Pressure injury/Skin: Turn Q2hrs while in bed, OOB to Chair, PT/OT     #Sleep:   - Maintain quiet hours and low stim environment.  - Melatonin PRN to maximize participation in therapy during the day.       #Precaution  - Fall, Aspiration    #GOC  CODE STATUS: FULL CODE

## 2023-03-03 PROCEDURE — 99232 SBSQ HOSP IP/OBS MODERATE 35: CPT | Mod: GC

## 2023-03-03 PROCEDURE — 99232 SBSQ HOSP IP/OBS MODERATE 35: CPT

## 2023-03-03 RX ADMIN — Medication 1 TABLET(S): at 11:06

## 2023-03-03 RX ADMIN — Medication 1 APPLICATION(S): at 05:29

## 2023-03-03 RX ADMIN — MUPIROCIN 1 APPLICATION(S): 20 OINTMENT TOPICAL at 17:34

## 2023-03-03 RX ADMIN — GABAPENTIN 100 MILLIGRAM(S): 400 CAPSULE ORAL at 21:50

## 2023-03-03 RX ADMIN — MUPIROCIN 1 APPLICATION(S): 20 OINTMENT TOPICAL at 05:29

## 2023-03-03 RX ADMIN — LIDOCAINE 1 PATCH: 4 CREAM TOPICAL at 20:00

## 2023-03-03 RX ADMIN — GABAPENTIN 100 MILLIGRAM(S): 400 CAPSULE ORAL at 05:29

## 2023-03-03 RX ADMIN — Medication 1 APPLICATION(S): at 11:07

## 2023-03-03 RX ADMIN — GABAPENTIN 100 MILLIGRAM(S): 400 CAPSULE ORAL at 13:01

## 2023-03-03 RX ADMIN — AMLODIPINE BESYLATE 5 MILLIGRAM(S): 2.5 TABLET ORAL at 05:28

## 2023-03-03 RX ADMIN — LATANOPROST 1 DROP(S): 0.05 SOLUTION/ DROPS OPHTHALMIC; TOPICAL at 21:51

## 2023-03-03 RX ADMIN — ENOXAPARIN SODIUM 40 MILLIGRAM(S): 100 INJECTION SUBCUTANEOUS at 11:06

## 2023-03-03 RX ADMIN — LIDOCAINE 1 PATCH: 4 CREAM TOPICAL at 06:45

## 2023-03-03 RX ADMIN — Medication 1 APPLICATION(S): at 17:34

## 2023-03-03 RX ADMIN — LIDOCAINE 1 PATCH: 4 CREAM TOPICAL at 07:33

## 2023-03-03 NOTE — PROGRESS NOTE ADULT - SUBJECTIVE AND OBJECTIVE BOX
Patient is a 89y old  Female who presents with a chief complaint of sacral fracture (02 Mar 2023 10:28)      HPI:  This is a 89-year-old woman with hypertension, glaucoma, arthritis, presents to [facility] ED on 2/21 with c/o worsened low back pain and difficulty ambulating for the past 2-3 days.  Pt lives alone and at baseline, walks independently, with no assistive devices.  Pt states she fell on her buttocks at home, about 2 weeks prior and she did see ortho.  Pt stated the pain did improve after a week, also take Tylenol prn.  However, she fell again about 3 days prior to presentataion.  Pt was okay. until for the past 2 days, low back pain has worsened, even with minimal movement.  Pt also reports pain not relieved with Tylenol. pt started taking Advil, but last time was yesterday.  She has difficulty standing and walking around due to the low back pain. She denied numbness, tingling, she is able to move all her extremities.  She denies fever, chills, chest pain, headache, dizziness, cough, dyspnea, dysuria.  She had nml BM and urination.   CT head neg for acute hemorrhage, extra-axial collection or displaced calvarial fracture.  Cervical spine CT showed no acute fracture or traumatic subluxation. Lumbar spine CT showed bilateral sacral germania insufficiency fractures. Multilevel degenerative changes of the lumbar spine. She was seen by ortho for acute, nondisplaced, minimally impacted sacral fracture, no surgical intervention, WBAT.      (24 Feb 2023 13:22)        SUBJECTIVE/OVERNIGHT EVENTS/ROS:  Patient was seen and examined at bedside this morning.   reports persistent back pain especially on awakening with movement.   Pain/dysesthesias in the feet are better today.  Patient denies fever, chills, chest pain, SOB. Denies nausea, vomiting, constipation, abdominal pain. moved bowels today  Discussed plan for EVAN at [facility] and safety measures upon discharge from rehab.      PAST MEDICAL & SURGICAL HISTORY:  Hypertension      History of total knee replacement  right      History of total hip arthroplasty  right with pinning      History of hip replacement, total, left          Allergies    No Known Allergies    Intolerances        MEDICATIONS  (STANDING):  amLODIPine   Tablet 5 milliGRAM(s) Oral daily  calcium carbonate 1250 mG  + Vitamin D (OsCal 500 + D) 1 Tablet(s) Oral daily  enoxaparin Injectable 40 milliGRAM(s) SubCutaneous every 24 hours  gabapentin 100 milliGRAM(s) Oral three times a day  latanoprost 0.005% Ophthalmic Solution 1 Drop(s) Both EYES every 24 hours  lidocaine   4% Patch 1 Patch Transdermal <User Schedule>  lidocaine   4% Patch 1 Patch Transdermal daily  multivitamin 1 Tablet(s) Oral daily  mupirocin 2% Ointment 1 Application(s) Topical two times a day  polyethylene glycol 3350 17 Gram(s) Oral two times a day  senna 2 Tablet(s) Oral at bedtime  triamcinolone 0.1% Ointment 1 Application(s) Topical two times a day  vitamin A &amp; D Ointment 1 Application(s) Topical daily    MEDICATIONS  (PRN):  acetaminophen     Tablet .. 650 milliGRAM(s) Oral every 6 hours PRN Temp greater or equal to 38C (100.4F), Mild Pain (1 - 3)  bisacodyl Suppository 10 milliGRAM(s) Rectal daily PRN Constipation  diclofenac sodium 1% Gel 2 Gram(s) Topical two times a day PRN shoulder pain  ibuprofen  Tablet. 400 milliGRAM(s) Oral three times a day PRN Moderate Pain (4 - 6)  oxycodone    5 mG/acetaminophen 325 mG 1 Tablet(s) Oral every 4 hours PRN Severe Pain (7 - 10)                            10.0   7.24  )-----------( 367      ( 02 Mar 2023 06:30 )             32.5     03-02    136  |  101  |  30<H>  ----------------------------<  97  4.1   |  29  |  0.83    Ca    9.1      02 Mar 2023 06:30    TPro  7.2  /  Alb  2.4<L>  /  TBili  0.2  /  DBili  x   /  AST  30  /  ALT  22  /  AlkPhos  143<H>  03-02        CAPILLARY BLOOD GLUCOSE          Vital Signs Last 24 Hrs  T(C): 36.4 (03 Mar 2023 08:15), Max: 36.6 (03 Mar 2023 07:47)  T(F): 97.6 (03 Mar 2023 08:15), Max: 97.8 (03 Mar 2023 07:47)  HR: 76 (03 Mar 2023 08:15) (65 - 76)  BP: 126/74 (03 Mar 2023 08:15) (110/62 - 126/74)  BP(mean): --  RR: 15 (03 Mar 2023 08:15) (15 - 16)  SpO2: 95% (03 Mar 2023 08:15) (93% - 95%)    Parameters below as of 03 Mar 2023 08:15  Patient On (Oxygen Delivery Method): room air          PHYSICAL EXAM:  General: NAD, Resting Comfortable,                                  HEENT: NC/AT, EOM I, PERRLA, Normal Conjunctivae  Cardio: RRR, Normal S1-S2, No M/G/R                              Pulm: No Respiratory Distress,  Lungs CTAB                        Abdomen: ND/NT, Soft, BS+                                                MSK: No joint swelling, Right shoulder Frozen                                        Ext: 1+ edema, No calf tenderness    Skin:  Left lower leg with scabs, erythema better, sl warmth, no drainage- stable                                                                Neuro - AAOx3, SILT, LLE eversion/DF weakness      # Case discussed at IDT rounds 2/28    SW: Patient lives alone in a private home  Family lives in [place], though patient has friends nearby who are able to help her.   1 ANDIE with 1F setup inside    OT: Limited at times by pain  UBD/LBD- Min/Total  Transfers - Total A    PT:  Amb- 8 steps with RW and WC follow Min-Mod assist  Transfers- Min-Mod assist    Discharge Planning- 3/7 EVAN.

## 2023-03-03 NOTE — CHART NOTE - NSCHARTNOTEFT_GEN_A_CORE
# Case discussed at IDT rounds 2/28    SW: Patient lives alone in a private home  Family lives in [place], though patient has friends nearby who are able to help her.   1 ANDIE with 1F setup inside    OT: Limited at times by pain  UBD/LBD- Min/Total  Transfers - Total A    PT:  Amb- 8 steps with RW and WC follow Min-Mod assist  Transfers- Min-Mod assist    Discharge Planning- 3/7 EVAN
Nutrition Follow Up Note  Hospital Course   (Per Electronic Medical Record)    Source:  Patient [X]  Medical Record [X]      Diet:   DASH-TLC Diet w/ Thin Liquids (IDDSI Level 0)  Tolerates Diet Consistency Well  No Chewing/Swallowing Difficulties  No Recent Nausea, Vomiting, Diarrhea or Constipation (as Per Patient)  Varied Intake @Meals (as Per Documentation) - States Good PO Intake/Appetite (Per Patient)  Nutrition Education Provided on DASH-TLC Diet     Enteral/Parenteral Nutrition: Not Applicable    Current Weight: 155.6lb on 2/28  Obtain New Weight  Obtain Weights Weekly     Pertinent Medications: MEDICATIONS  (STANDING):  amLODIPine   Tablet 5 milliGRAM(s) Oral daily  calcium carbonate 1250 mG  + Vitamin D (OsCal 500 + D) 1 Tablet(s) Oral daily  enoxaparin Injectable 40 milliGRAM(s) SubCutaneous every 24 hours  gabapentin 100 milliGRAM(s) Oral three times a day  latanoprost 0.005% Ophthalmic Solution 1 Drop(s) Both EYES every 24 hours  lidocaine   4% Patch 1 Patch Transdermal <User Schedule>  lidocaine   4% Patch 1 Patch Transdermal daily  multivitamin 1 Tablet(s) Oral daily  mupirocin 2% Ointment 1 Application(s) Topical two times a day  polyethylene glycol 3350 17 Gram(s) Oral two times a day  senna 2 Tablet(s) Oral at bedtime  triamcinolone 0.1% Ointment 1 Application(s) Topical two times a day  vitamin A &amp; D Ointment 1 Application(s) Topical daily    MEDICATIONS  (PRN):  acetaminophen     Tablet .. 650 milliGRAM(s) Oral every 6 hours PRN Temp greater or equal to 38C (100.4F), Mild Pain (1 - 3)  bisacodyl Suppository 10 milliGRAM(s) Rectal daily PRN Constipation  diclofenac sodium 1% Gel 2 Gram(s) Topical two times a day PRN shoulder pain  ibuprofen  Tablet. 400 milliGRAM(s) Oral three times a day PRN Moderate Pain (4 - 6)  oxycodone    5 mG/acetaminophen 325 mG 1 Tablet(s) Oral every 4 hours PRN Severe Pain (7 - 10)    Pertinent Labs:  03-02 Na136 mmol/L Glu 97 mg/dL K+ 4.1 mmol/L Cr  0.83 mg/dL BUN 30 mg/dL<H> 03-02 Alb 2.4 g/dL<L>    Skin: No Pressure Ulcers     Edema: +1 Edema Noted to Feet  (as Per Documentation)     Last Bowel Movement: on 3/2    Estimated Needs:   [X] No Change Since Previous Assessment    Previous Nutrition Diagnosis:   Increased Nutrient Needs     Nutrition Diagnosis is [X] Resolved - No Pressure Ulcers     New Nutrition Diagnosis: [X] Not Applicable    Interventions:   1. Nutrition Education Provided on DASH-TLC Diet   2. Recommend Continue Nutrition Plan of Care     Monitoring & Evaluation:   [X] Weights   [X] PO Intake   [X] Skin Integrity   [X] Follow Up (Per Protocol)  [X] Tolerance to Diet Prescription   [X] Other: Labs     Registered Dietitian/Nutritionist Remains Available.  Philip Salinas RDN    Phone# [phone]
[facility] Plan of Care    REHABILITATION DIAGNOSIS:  Other fracture of sacrum, initial encounter for closed fracture          COMORBIDITIES/COMPLICATING CONDITIONS IMPACTING REHABILITATION:  HEALTH ISSUES - PROBLEM Dx:  Sacral fracture    Essential hypertension    Cellulitis          PAST MEDICAL & SURGICAL HISTORY:  Hypertension      History of total knee replacement  right      History of total hip arthroplasty  right with pinning      History of hip replacement, total, left          Based upon consideration of the patient's impairments, functional status, complicating conditions and any other contributing factors and after information garnered from the assessments of all therapy disciplines involved in treating the patient and other pertinent clinicians:    INTERDISCIPLINARY REHABILITATION INTERVENTIONS:    [ X  ] Transfer Training  [ X  ] Bed Mobility  [ X  ] Therapeutic Exercise  [ X ] Balance/Coordination Exercises  [ X ] Locomotion retraining  [ X  ] Stairs  [  X ] Functional Transfer Training  [ x  ] Bowel/Bladder program  [ x  ] Pain Management  [   ] Skin/Wound Care  [   ] Visual/Perceptual Training  [   ] Therapeutic Recreation Activities  [   ] Neuromuscular Re-education  [ X  ] Activities of Daily Living  [   ] Speech Exercise  [   ] Swallowing Exercises  [   ] Vital Stim  [   ] Dietary Supplements  [   ] Calorie Count  [   ] Cognitive Exercises  [   ] Congnitive/Linguistic Treatment  [   ] Behavior Program  [   ] Neuropsych Therapy  [ X  ] Patient/Family Counseling  [ X ] Family Training  [ X  ] Community Re-entry  [   ] Orthotic Evaluation  [   ] Prosthetic Eval/Training    MEDICAL PROGNOSIS:  good    REHAB POTENTIAL:  good  EXPECTED DAILY THERAPY:         PT:2hr       OT:1hr       ST:       P&O:    EXPECTED INTENSITY OF PROGRAM:  3 hrs / Day    EXPECTED FREQUENCY OF PROGRAM: 5 Days/ Week    ESTIMATED LOS:  [  ] 5-7 Days  [  ] 7-10 Days  [ x ] 10- 14 Days  [  ] 14- 18 Days  [  ] 18- 21 Days    ESTIMATED DISPOSITION:  [  ] Home   [  ] Home with Outpatient Therapies  [x  ] Home with Home Therapies  [  ] Assisted Living  [  ] Nursing Home  [  ] Long Term Acute Care    INTERDISCIPLINARY FUNCTIONAL OUTCOMES/GOALS:         Gait/Mobility:5       Transfers:5       ADLs:5       Functional Transfers:5       Medication Management:7       Communication:NA       Cognitive:NA       Dysphagia:NA       Bladder7       Bowel:7     Functional Independent Measures:   7 = Independent  6 = Modified Independent  5 = Supervision  4 = Minimal Assist/ Contact Guard  3 = Moderate Assistance  2 = Maximum Assistance  1 = Total Assistance  0 = Unable to assess

## 2023-03-03 NOTE — PROGRESS NOTE ADULT - ASSESSMENT
This is a 89-year-old woman with hypertension, glaucoma, arthritis, presents to [facility] ED on 2/21 with c/o worsened low back pain and difficulty ambulating for the past 2-3 days s/p falls x 2 at home about 2.5 weeks apart. She was found to have acute, nondisplaced, minimally impacted sacral fracture, no surgical intervention. Patient now with gait Instability, ADL impairments and Functional impairments.    #Sacral Fracture  - No surgical intervention  - Comprehensive Rehab Program: PT/OT, 3hours daily and 5 days weekly  - PT: Focused on improving strength, endurance, coordination, balance, functional mobility, and transfers  - OT: Focused on improving strength, fine motor skills, coordination, posture and ADLs.    - WB Status: WBAT  management- percoset PRN, add lidoderm, ICE- low dose gabapentin started- ongoing improvement in LE burning sensation     #HTN  - Amlodipine 5mg daily    #Cellulitis  - Keflex 500mg q 12 hrs- finished 7 days - to DC  - Topical Triamcinolone BID to lower legs with Mupirocin PRN to open wounds    #Glaucoma  - Xalatan opthal    HELDER  BUN 38>29 >30 3/2  encourage PO fluids      #Pain management  - Tylenol PRN, ibuprofen, Oxycodone PRN, [id number] 3x/day added    #DVT ppx  - Lovenox,     #Bowel Regimen  - Senna, miralax   Moved Bowels 3/3    #Bladder management  - BS on admission, and q 8 hours (SC if > 400)  - Monitor UO    #FEN   - Diet: Renal   - Supplements: oscal D     #Skin:  - Skin on admission: intact  - Pressure injury/Skin: Turn Q2hrs while in bed, OOB to Chair, PT/OT     #Sleep:   - Maintain quiet hours and low stim environment.  - Melatonin PRN to maximize participation in therapy during the day.       #Precaution  - Fall, Aspiration    #GOC  CODE STATUS: FULL CODE

## 2023-03-03 NOTE — PROGRESS NOTE ADULT - SUBJECTIVE AND OBJECTIVE BOX
Patient is a 89y old  Female who presents with a chief complaint of sacral fracture (02 Mar 2023 13:42)      INTERVAL History of Present Illness/OVERNIGHT EVENTS: pain 5/10 sacrum - improved with pain meds, worse with sitting/ambulation    MEDICATIONS  (STANDING):  amLODIPine   Tablet 5 milliGRAM(s) Oral daily  calcium carbonate 1250 mG  + Vitamin D (OsCal 500 + D) 1 Tablet(s) Oral daily  enoxaparin Injectable 40 milliGRAM(s) SubCutaneous every 24 hours  gabapentin 100 milliGRAM(s) Oral three times a day  latanoprost 0.005% Ophthalmic Solution 1 Drop(s) Both EYES every 24 hours  lidocaine   4% Patch 1 Patch Transdermal <User Schedule>  lidocaine   4% Patch 1 Patch Transdermal daily  multivitamin 1 Tablet(s) Oral daily  mupirocin 2% Ointment 1 Application(s) Topical two times a day  polyethylene glycol 3350 17 Gram(s) Oral two times a day  senna 2 Tablet(s) Oral at bedtime  triamcinolone 0.1% Ointment 1 Application(s) Topical two times a day  vitamin A &amp; D Ointment 1 Application(s) Topical daily    MEDICATIONS  (PRN):  acetaminophen     Tablet .. 650 milliGRAM(s) Oral every 6 hours PRN Temp greater or equal to 38C (100.4F), Mild Pain (1 - 3)  bisacodyl Suppository 10 milliGRAM(s) Rectal daily PRN Constipation  diclofenac sodium 1% Gel 2 Gram(s) Topical two times a day PRN shoulder pain  ibuprofen  Tablet. 400 milliGRAM(s) Oral three times a day PRN Moderate Pain (4 - 6)  oxycodone    5 mG/acetaminophen 325 mG 1 Tablet(s) Oral every 4 hours PRN Severe Pain (7 - 10)      Allergies    No Known Allergies    Intolerances        REVIEW OF SYSTEMS:  Other systems currently negative unless otherwise specified above.    Vital Signs Last 24 Hrs  T(C): 36.4 (03 Mar 2023 08:15), Max: 36.6 (03 Mar 2023 07:47)  T(F): 97.6 (03 Mar 2023 08:15), Max: 97.8 (03 Mar 2023 07:47)  HR: 76 (03 Mar 2023 08:15) (65 - 76)  BP: 126/74 (03 Mar 2023 08:15) (110/62 - 126/74)  BP(mean): --  RR: 15 (03 Mar 2023 08:15) (15 - 16)  SpO2: 95% (03 Mar 2023 08:15) (93% - 95%)    Parameters below as of 03 Mar 2023 08:15  Patient On (Oxygen Delivery Method): room air            PHYSICAL EXAM:  GENERAL: No apparent distress, appears stated age  EYES: Conjunctiva and sclera clear, no discharge  ENMT: Moist mucous membranes, no nasal discharge  CHEST/LUNG: Clear to auscultation bilaterally, no wheeze or rales  HEART: Regular rhythm, no rubs or gallops  ABDOMEN: Soft, Nontender, Nondistended  EXTREMITIES:  No clubbing, cyanosis or edema      LABS:      Ca    9.1        02 Mar 2023 06:30          CAPILLARY BLOOD GLUCOSE            RADIOLOGY & ADDITIONAL TESTS:      Images reviewed personally    Consultant Notes Reviewed and Care Discussed with relevant Consultants.

## 2023-03-03 NOTE — PROGRESS NOTE ADULT - ASSESSMENT
89F with HTN, arthritis, recent recurrent falls at home resulting in sacral fracture, now in acute rehab.    #Sacral fracture  -PT/OT  -Pain control  - Bowel rx    #Essential HTN  Blood pressure meds with hold parameters     #DVT ppx: Lovenox    Fall precautions

## 2023-03-04 PROCEDURE — 99232 SBSQ HOSP IP/OBS MODERATE 35: CPT | Mod: GC

## 2023-03-04 PROCEDURE — 99232 SBSQ HOSP IP/OBS MODERATE 35: CPT

## 2023-03-04 RX ADMIN — MUPIROCIN 1 APPLICATION(S): 20 OINTMENT TOPICAL at 18:10

## 2023-03-04 RX ADMIN — Medication 1 APPLICATION(S): at 18:10

## 2023-03-04 RX ADMIN — LIDOCAINE 1 PATCH: 4 CREAM TOPICAL at 23:59

## 2023-03-04 RX ADMIN — LIDOCAINE 1 PATCH: 4 CREAM TOPICAL at 11:42

## 2023-03-04 RX ADMIN — GABAPENTIN 100 MILLIGRAM(S): 400 CAPSULE ORAL at 21:41

## 2023-03-04 RX ADMIN — LIDOCAINE 1 PATCH: 4 CREAM TOPICAL at 18:49

## 2023-03-04 RX ADMIN — LIDOCAINE 1 PATCH: 4 CREAM TOPICAL at 06:30

## 2023-03-04 RX ADMIN — LIDOCAINE 1 PATCH: 4 CREAM TOPICAL at 09:46

## 2023-03-04 RX ADMIN — Medication 1 APPLICATION(S): at 06:19

## 2023-03-04 RX ADMIN — Medication 1 APPLICATION(S): at 11:43

## 2023-03-04 RX ADMIN — Medication 1 TABLET(S): at 11:41

## 2023-03-04 RX ADMIN — ENOXAPARIN SODIUM 40 MILLIGRAM(S): 100 INJECTION SUBCUTANEOUS at 11:41

## 2023-03-04 RX ADMIN — GABAPENTIN 100 MILLIGRAM(S): 400 CAPSULE ORAL at 13:32

## 2023-03-04 RX ADMIN — GABAPENTIN 100 MILLIGRAM(S): 400 CAPSULE ORAL at 06:18

## 2023-03-04 RX ADMIN — MUPIROCIN 1 APPLICATION(S): 20 OINTMENT TOPICAL at 06:19

## 2023-03-04 RX ADMIN — AMLODIPINE BESYLATE 5 MILLIGRAM(S): 2.5 TABLET ORAL at 06:19

## 2023-03-04 RX ADMIN — SENNA PLUS 2 TABLET(S): 8.6 TABLET ORAL at 21:41

## 2023-03-04 RX ADMIN — LATANOPROST 1 DROP(S): 0.05 SOLUTION/ DROPS OPHTHALMIC; TOPICAL at 21:41

## 2023-03-04 NOTE — PROGRESS NOTE ADULT - ASSESSMENT
This is a 89-year-old woman with hypertension, glaucoma, arthritis, presents to [facility] ED on 2/21 with c/o worsened low back pain and difficulty ambulating for the past 2-3 days s/p falls x 2 at home about 2.5 weeks apart. She was found to have acute, nondisplaced, minimally impacted sacral fracture, no surgical intervention. Patient now with gait Instability, ADL impairments and Functional impairments.    #Sacral Fracture  - No surgical intervention  - Comprehensive Rehab Program: PT/OT, 3hours daily and 5 days weekly  - PT: Focused on improving strength, endurance, coordination, balance, functional mobility, and transfers  - OT: Focused on improving strength, fine motor skills, coordination, posture and ADLs.    - WB Status: WBAT  management- percoset PRN, add lidoderm, ICE- low dose gabapentin started- ongoing improvement in LE burning sensation     #HTN  - Amlodipine 5mg daily    #Cellulitis  - Keflex 500mg q 12 hrs- finished 7 days - to DC  - Topical Triamcinolone BID to lower legs with Mupirocin PRN to open wounds    #Glaucoma  - Xalatan opthal    HELDER  BUN 38>29 >30 3/2  encourage PO fluids      #Pain management  - Tylenol PRN, ibuprofen, Oxycodone PRN, [id number] 3x/day added    #DVT ppx  - Lovenox,     #Bowel Regimen  - Senna, miralax   Moved Bowels 3/3    #Bladder management  - BS on admission, and q 8 hours (SC if > 400)  - Monitor UO    #FEN   - Diet: Renal   - Supplements: oscal D     #Skin:  - Skin on admission: intact  - Pressure injury/Skin: Turn Q2hrs while in bed, OOB to Chair, PT/OT     #Sleep:   - Maintain quiet hours and low stim environment.  - Melatonin PRN to maximize participation in therapy during the day.       #Precaution  - Fall, Aspiration    #GOC  CODE STATUS: FULL CODE

## 2023-03-04 NOTE — PROGRESS NOTE ADULT - SUBJECTIVE AND OBJECTIVE BOX
Patient is a 89y old  Female who presents with a chief complaint of sacral fracture (02 Mar 2023 10:28)      HPI:  This is a 89-year-old woman with hypertension, glaucoma, arthritis, presents to [facility] ED on 2/21 with c/o worsened low back pain and difficulty ambulating for the past 2-3 days.  Pt lives alone and at baseline, walks independently, with no assistive devices.  Pt states she fell on her buttocks at home, about 2 weeks prior and she did see ortho.  Pt stated the pain did improve after a week, also take Tylenol prn.  However, she fell again about 3 days prior to presentataion.  Pt was okay. until for the past 2 days, low back pain has worsened, even with minimal movement.  Pt also reports pain not relieved with Tylenol. pt started taking Advil, but last time was yesterday.  She has difficulty standing and walking around due to the low back pain. She denied numbness, tingling, she is able to move all her extremities.  She denies fever, chills, chest pain, headache, dizziness, cough, dyspnea, dysuria.  She had nml BM and urination.   CT head neg for acute hemorrhage, extra-axial collection or displaced calvarial fracture.  Cervical spine CT showed no acute fracture or traumatic subluxation. Lumbar spine CT showed bilateral sacral germania insufficiency fractures. Multilevel degenerative changes of the lumbar spine. She was seen by ortho for acute, nondisplaced, minimally impacted sacral fracture, no surgical intervention, WBAT.      (24 Feb 2023 13:22)        SUBJECTIVE/OVERNIGHT EVENTS/ROS:  Patient was seen and examined at bedside this morning.   persistent back pain with movement but reports less than on admission- wants to try to get OOB to WC   Pain/dysesthesias in the feet are better today.  Patient denies fever, chills, chest pain, SOB. Denies nausea, vomiting, constipation, abdominal pain. moved bowels X3 yesterday  Discussed plan for EVAN at [facility] and safety measures upon discharge from rehab.  dressings Off Both legs      PAST MEDICAL & SURGICAL HISTORY:  Hypertension      History of total knee replacement  right      History of total hip arthroplasty  right with pinning      History of hip replacement, total, left          Allergies    No Known Allergies    Intolerances        MEDICATIONS  (STANDING):  amLODIPine   Tablet 5 milliGRAM(s) Oral daily  calcium carbonate 1250 mG  + Vitamin D (OsCal 500 + D) 1 Tablet(s) Oral daily  enoxaparin Injectable 40 milliGRAM(s) SubCutaneous every 24 hours  gabapentin 100 milliGRAM(s) Oral three times a day  latanoprost 0.005% Ophthalmic Solution 1 Drop(s) Both EYES every 24 hours  lidocaine   4% Patch 1 Patch Transdermal <User Schedule>  lidocaine   4% Patch 1 Patch Transdermal daily  multivitamin 1 Tablet(s) Oral daily  mupirocin 2% Ointment 1 Application(s) Topical two times a day  polyethylene glycol 3350 17 Gram(s) Oral two times a day  senna 2 Tablet(s) Oral at bedtime  triamcinolone 0.1% Ointment 1 Application(s) Topical two times a day  vitamin A &amp; D Ointment 1 Application(s) Topical daily    MEDICATIONS  (PRN):  acetaminophen     Tablet .. 650 milliGRAM(s) Oral every 6 hours PRN Temp greater or equal to 38C (100.4F), Mild Pain (1 - 3)  bisacodyl Suppository 10 milliGRAM(s) Rectal daily PRN Constipation  diclofenac sodium 1% Gel 2 Gram(s) Topical two times a day PRN shoulder pain  ibuprofen  Tablet. 400 milliGRAM(s) Oral three times a day PRN Moderate Pain (4 - 6)  oxycodone    5 mG/acetaminophen 325 mG 1 Tablet(s) Oral every 4 hours PRN Severe Pain (7 - 10)                            10.0   7.24  )-----------( 367      ( 02 Mar 2023 06:30 )             32.5     03-02    136  |  101  |  30<H>  ----------------------------<  97  4.1   |  29  |  0.83    Ca    9.1      02 Mar 2023 06:30    TPro  7.2  /  Alb  2.4<L>  /  TBili  0.2  /  DBili  x   /  AST  30  /  ALT  22  /  AlkPhos  143<H>  03-02        CAPILLARY BLOOD GLUCOSE          Vital Signs Last 24 Hrs  T(C): 36.7 (04 Mar 2023 08:32), Max: 37.1 (03 Mar 2023 21:57)  T(F): 98 (04 Mar 2023 08:32), Max: 98.7 (03 Mar 2023 21:57)  HR: 82 (04 Mar 2023 08:32) (75 - 82)  BP: 144/74 (04 Mar 2023 08:32) (119/67 - 144/74)  BP(mean): --  RR: 16 (04 Mar 2023 08:32) (16 - 16)  SpO2: 92% (04 Mar 2023 08:32) (92% - 93%)    Parameters below as of 04 Mar 2023 08:32  Patient On (Oxygen Delivery Method): room air      PHYSICAL EXAM:  General: NAD, Resting Comfortable,                                  HEENT: NC/AT, EOM I, PERRLA, Normal Conjunctivae  Cardio: RRR, Normal S1-S2, No M/G/R                              Pulm: No Respiratory Distress,  Lungs CTAB                        Abdomen: ND/NT, Soft, BS+                                                MSK: No joint swelling, Right shoulder Frozen                                        Ext: 1+ edema, No calf tenderness    Skin:  Left lower leg with scabs, no erythema                                                               Neuro - AAOx3, SILT, LLE eversion/DF weakness sl improved      # Case discussed at IDT rounds 2/28    SW: Patient lives alone in a private home  Family lives in [place], though patient has friends nearby who are able to help her.   1 ANDIE with 1F setup inside    OT: Limited at times by pain  UBD/LBD- Min/Total  Transfers - Total A    PT:  Amb- 8 steps with RW and WC follow Min-Mod assist  Transfers- Min-Mod assist    Discharge Planning- 3/7 EVAN.

## 2023-03-04 NOTE — PROGRESS NOTE ADULT - SUBJECTIVE AND OBJECTIVE BOX
Hospitalist: Vivi Emanuel DO    CHIEF COMPLAINT: Patient is a 89y old  female who presents with a chief complaint of sacral fracture (03 Mar 2023 14:18)      SUBJECTIVE / OVERNIGHT EVENTS: Patient seen and examined. No acute events overnight. Pain well controlled and patient without any complaints.    MEDICATIONS  (STANDING):  amLODIPine   Tablet 5 milliGRAM(s) Oral daily  calcium carbonate 1250 mG  + Vitamin D (OsCal 500 + D) 1 Tablet(s) Oral daily  enoxaparin Injectable 40 milliGRAM(s) SubCutaneous every 24 hours  gabapentin 100 milliGRAM(s) Oral three times a day  latanoprost 0.005% Ophthalmic Solution 1 Drop(s) Both EYES every 24 hours  lidocaine   4% Patch 1 Patch Transdermal <User Schedule>  lidocaine   4% Patch 1 Patch Transdermal daily  multivitamin 1 Tablet(s) Oral daily  mupirocin 2% Ointment 1 Application(s) Topical two times a day  polyethylene glycol 3350 17 Gram(s) Oral two times a day  senna 2 Tablet(s) Oral at bedtime  triamcinolone 0.1% Ointment 1 Application(s) Topical two times a day  vitamin A &amp; D Ointment 1 Application(s) Topical daily    MEDICATIONS  (PRN):  acetaminophen     Tablet .. 650 milliGRAM(s) Oral every 6 hours PRN Temp greater or equal to 38C (100.4F), Mild Pain (1 - 3)  bisacodyl Suppository 10 milliGRAM(s) Rectal daily PRN Constipation  diclofenac sodium 1% Gel 2 Gram(s) Topical two times a day PRN shoulder pain  ibuprofen  Tablet. 400 milliGRAM(s) Oral three times a day PRN Moderate Pain (4 - 6)  oxycodone    5 mG/acetaminophen 325 mG 1 Tablet(s) Oral every 4 hours PRN Severe Pain (7 - 10)      VITALS:  T(F): 98 (03-04-23 @ 08:32), Max: 98.7 (03-03-23 @ 21:57)  HR: 82 (03-04-23 @ 08:32) (75 - 82)  BP: 144/74 (03-04-23 @ 08:32) (119/67 - 144/74)  RR: 16 (03-04-23 @ 08:32) (16 - 16)  SpO2: 92% (03-04-23 @ 08:32)      REVIEW OF SYSTEMS:  For ROV please refer back to H&P     PHYSICAL EXAM:  GENERAL: No apparent distress, appears stated age  EYES: Conjunctiva and sclera clear, no discharge  ENMT: Moist mucous membranes, no nasal discharge  CHEST/LUNG: Clear to auscultation bilaterally, no wheeze or rales  HEART: Regular rhythm, no rubs or gallops  ABDOMEN: Soft, Nontender, Nondistended  EXTREMITIES:  No clubbing, cyanosis or edema      RADIOLOGY & ADDITIONAL TESTS:    Imaging Personally Reviewed:    [X] Consultant(s) Notes Reviewed:  [X] Care Discussed with Consultants/Other Providers:

## 2023-03-05 ENCOUNTER — TRANSCRIPTION ENCOUNTER (OUTPATIENT)
Age: 88
End: 2023-03-05

## 2023-03-05 PROCEDURE — 99232 SBSQ HOSP IP/OBS MODERATE 35: CPT

## 2023-03-05 PROCEDURE — 99232 SBSQ HOSP IP/OBS MODERATE 35: CPT | Mod: GC

## 2023-03-05 RX ADMIN — SENNA PLUS 2 TABLET(S): 8.6 TABLET ORAL at 21:50

## 2023-03-05 RX ADMIN — Medication 1 TABLET(S): at 12:51

## 2023-03-05 RX ADMIN — ENOXAPARIN SODIUM 40 MILLIGRAM(S): 100 INJECTION SUBCUTANEOUS at 12:51

## 2023-03-05 RX ADMIN — Medication 1 APPLICATION(S): at 17:57

## 2023-03-05 RX ADMIN — POLYETHYLENE GLYCOL 3350 17 GRAM(S): 17 POWDER, FOR SOLUTION ORAL at 17:58

## 2023-03-05 RX ADMIN — Medication 1 TABLET(S): at 12:52

## 2023-03-05 RX ADMIN — Medication 1 APPLICATION(S): at 05:38

## 2023-03-05 RX ADMIN — LIDOCAINE 1 PATCH: 4 CREAM TOPICAL at 07:01

## 2023-03-05 RX ADMIN — AMLODIPINE BESYLATE 5 MILLIGRAM(S): 2.5 TABLET ORAL at 05:37

## 2023-03-05 RX ADMIN — LIDOCAINE 1 PATCH: 4 CREAM TOPICAL at 06:59

## 2023-03-05 RX ADMIN — MUPIROCIN 1 APPLICATION(S): 20 OINTMENT TOPICAL at 17:58

## 2023-03-05 RX ADMIN — GABAPENTIN 100 MILLIGRAM(S): 400 CAPSULE ORAL at 21:50

## 2023-03-05 RX ADMIN — MUPIROCIN 1 APPLICATION(S): 20 OINTMENT TOPICAL at 05:38

## 2023-03-05 RX ADMIN — LIDOCAINE 1 PATCH: 4 CREAM TOPICAL at 19:00

## 2023-03-05 RX ADMIN — Medication 1 APPLICATION(S): at 12:52

## 2023-03-05 RX ADMIN — LATANOPROST 1 DROP(S): 0.05 SOLUTION/ DROPS OPHTHALMIC; TOPICAL at 21:50

## 2023-03-05 RX ADMIN — GABAPENTIN 100 MILLIGRAM(S): 400 CAPSULE ORAL at 05:37

## 2023-03-05 NOTE — PROGRESS NOTE ADULT - SUBJECTIVE AND OBJECTIVE BOX
Hospitalist: Vivi Emanuel DO    CHIEF COMPLAINT: Patient is a 89y old  female who presents with a chief complaint of sacral fracture (05 Mar 2023 07:42)      SUBJECTIVE / OVERNIGHT EVENTS: Patient seen and examined. No acute events overnight. Pain well controlled and patient without any complaints.    MEDICATIONS  (STANDING):  amLODIPine   Tablet 5 milliGRAM(s) Oral daily  calcium carbonate 1250 mG  + Vitamin D (OsCal 500 + D) 1 Tablet(s) Oral daily  enoxaparin Injectable 40 milliGRAM(s) SubCutaneous every 24 hours  gabapentin 100 milliGRAM(s) Oral three times a day  latanoprost 0.005% Ophthalmic Solution 1 Drop(s) Both EYES every 24 hours  lidocaine   4% Patch 1 Patch Transdermal <User Schedule>  lidocaine   4% Patch 1 Patch Transdermal daily  multivitamin 1 Tablet(s) Oral daily  mupirocin 2% Ointment 1 Application(s) Topical two times a day  polyethylene glycol 3350 17 Gram(s) Oral two times a day  senna 2 Tablet(s) Oral at bedtime  triamcinolone 0.1% Ointment 1 Application(s) Topical two times a day  vitamin A &amp; D Ointment 1 Application(s) Topical daily    MEDICATIONS  (PRN):  acetaminophen     Tablet .. 650 milliGRAM(s) Oral every 6 hours PRN Temp greater or equal to 38C (100.4F), Mild Pain (1 - 3)  bisacodyl Suppository 10 milliGRAM(s) Rectal daily PRN Constipation  diclofenac sodium 1% Gel 2 Gram(s) Topical two times a day PRN shoulder pain  ibuprofen  Tablet. 400 milliGRAM(s) Oral three times a day PRN Moderate Pain (4 - 6)  oxycodone    5 mG/acetaminophen 325 mG 1 Tablet(s) Oral every 4 hours PRN Severe Pain (7 - 10)      VITALS:  T(F): 97.3 (03-05-23 @ 09:03), Max: 97.9 (03-04-23 @ 21:45)  HR: 80 (03-05-23 @ 09:03) (74 - 80)  BP: 134/76 (03-05-23 @ 09:03) (134/76 - 140/77)  RR: 16 (03-05-23 @ 09:03) (16 - 16)  SpO2: 95% (03-05-23 @ 09:03)      REVIEW OF SYSTEMS:  For ROV please refer back to H&P     PHYSICAL EXAM:  GENERAL: No apparent distress, appears stated age  EYES: Conjunctiva and sclera clear, no discharge  ENMT: Moist mucous membranes, no nasal discharge  CHEST/LUNG: Clear to auscultation bilaterally, no wheeze or rales  HEART: Regular rhythm, no rubs or gallops  ABDOMEN: Soft, Nontender, Nondistended  EXTREMITIES:  No clubbing, cyanosis or edema      RADIOLOGY & ADDITIONAL TESTS:    Imaging Personally Reviewed:    [X] Consultant(s) Notes Reviewed:  [X] Care Discussed with Consultants/Other Providers:

## 2023-03-05 NOTE — DISCHARGE NOTE PROVIDER - HOSPITAL COURSE
This is a 89-year-old woman with hypertension, glaucoma, arthritis, presents to [facility] ED on 2/21 with c/o worsened low back pain and difficulty ambulating for the past 2-3 days.  Pt lives alone and at baseline, walks independently, with no assistive devices.  Pt states she fell on her buttocks at home, about 2 weeks prior and she did see ortho.  Pt stated the pain did improve after a week, also take Tylenol prn.  However, she fell again about 3 days prior to presentataion.  Pt was okay. until for the past 2 days, low back pain has worsened, even with minimal movement.  Pt also reports pain not relieved with Tylenol. pt started taking Advil, but last time was yesterday.  She has difficulty standing and walking around due to the low back pain. She denied numbness, tingling, she is able to move all her extremities.  She denies fever, chills, chest pain, headache, dizziness, cough, dyspnea, dysuria.  She had nml BM and urination.   CT head neg for acute hemorrhage, extra-axial collection or displaced calvarial fracture.  Cervical spine CT showed no acute fracture or traumatic subluxation. Lumbar spine CT showed bilateral sacral germania insufficiency fractures. Multilevel degenerative changes of the lumbar spine. She was seen by ortho for acute, nondisplaced, minimally impacted sacral fracture, no surgical intervention, WBAT.     REHAB COURSE:  Patient was stable upon rehab admission to  [facility]. Admitted with gait instability, ADL, and functional impairments.     Rehab Course was relatively uncomplicated. Patient finished a 7-day course of oral antibiotics for LLE cellulitis. She experienced mild burning pain in the distal lower extremities which improved with gabapentin.    Patient tolerated course of inpatient PT/OT/SLP rehab with significant functional improvements and met rehab goals prior to discharge. Patient was medically stable and optimized for discharge to [facility] with follow-up with Orthopedic Surgery, PM&R, and PCP.

## 2023-03-05 NOTE — PROGRESS NOTE ADULT - SUBJECTIVE AND OBJECTIVE BOX
Patient is a 89y old  Female who presents with a chief complaint of sacral fracture (02 Mar 2023 10:28)      HPI:  This is a 89-year-old woman with hypertension, glaucoma, arthritis, presents to [facility] ED on 2/21 with c/o worsened low back pain and difficulty ambulating for the past 2-3 days.  Pt lives alone and at baseline, walks independently, with no assistive devices.  Pt states she fell on her buttocks at home, about 2 weeks prior and she did see ortho.  Pt stated the pain did improve after a week, also take Tylenol prn.  However, she fell again about 3 days prior to presentataion.  Pt was okay. until for the past 2 days, low back pain has worsened, even with minimal movement.  Pt also reports pain not relieved with Tylenol. pt started taking Advil, but last time was yesterday.  She has difficulty standing and walking around due to the low back pain. She denied numbness, tingling, she is able to move all her extremities.  She denies fever, chills, chest pain, headache, dizziness, cough, dyspnea, dysuria.  She had nml BM and urination.   CT head neg for acute hemorrhage, extra-axial collection or displaced calvarial fracture.  Cervical spine CT showed no acute fracture or traumatic subluxation. Lumbar spine CT showed bilateral sacral germania insufficiency fractures. Multilevel degenerative changes of the lumbar spine. She was seen by ortho for acute, nondisplaced, minimally impacted sacral fracture, no surgical intervention, WBAT.      (24 Feb 2023 13:22)        SUBJECTIVE/OVERNIGHT EVENTS/ROS:  Patient was seen and examined at bedside this morning.   no issues overnight  Pain/dysesthesias in the feet are better today.  Patient denies fever, chills, chest pain, SOB. Denies nausea, vomiting, constipation, abdominal pain. moved bowels X3 yesterday  Discussed plan for [facility] at [facility] and safety measures upon discharge from rehab.  dressings Off Both legs- receiving ointments      PAST MEDICAL & SURGICAL HISTORY:  Hypertension      History of total knee replacement  right      History of total hip arthroplasty  right with pinning      History of hip replacement, total, left          Allergies    No Known Allergies    Intolerances        MEDICATIONS  (STANDING):  amLODIPine   Tablet 5 milliGRAM(s) Oral daily  calcium carbonate 1250 mG  + Vitamin D (OsCal 500 + D) 1 Tablet(s) Oral daily  enoxaparin Injectable 40 milliGRAM(s) SubCutaneous every 24 hours  gabapentin 100 milliGRAM(s) Oral three times a day  latanoprost 0.005% Ophthalmic Solution 1 Drop(s) Both EYES every 24 hours  lidocaine   4% Patch 1 Patch Transdermal <User Schedule>  lidocaine   4% Patch 1 Patch Transdermal daily  multivitamin 1 Tablet(s) Oral daily  mupirocin 2% Ointment 1 Application(s) Topical two times a day  polyethylene glycol 3350 17 Gram(s) Oral two times a day  senna 2 Tablet(s) Oral at bedtime  triamcinolone 0.1% Ointment 1 Application(s) Topical two times a day  vitamin A &amp; D Ointment 1 Application(s) Topical daily    MEDICATIONS  (PRN):  acetaminophen     Tablet .. 650 milliGRAM(s) Oral every 6 hours PRN Temp greater or equal to 38C (100.4F), Mild Pain (1 - 3)  bisacodyl Suppository 10 milliGRAM(s) Rectal daily PRN Constipation  diclofenac sodium 1% Gel 2 Gram(s) Topical two times a day PRN shoulder pain  ibuprofen  Tablet. 400 milliGRAM(s) Oral three times a day PRN Moderate Pain (4 - 6)  oxycodone    5 mG/acetaminophen 325 mG 1 Tablet(s) Oral every 4 hours PRN Severe Pain (7 - 10)                            10.0   7.24  )-----------( 367      ( 02 Mar 2023 06:30 )             32.5     03-02    136  |  101  |  30<H>  ----------------------------<  97  4.1   |  29  |  0.83    Ca    9.1      02 Mar 2023 06:30    TPro  7.2  /  Alb  2.4<L>  /  TBili  0.2  /  DBili  x   /  AST  30  /  ALT  22  /  AlkPhos  143<H>  03-02        CAPILLARY BLOOD GLUCOSE          Vital Signs Last 24 Hrs  T(C): 36.3 (05 Mar 2023 09:03), Max: 36.6 (04 Mar 2023 21:45)  T(F): 97.3 (05 Mar 2023 09:03), Max: 97.9 (04 Mar 2023 21:45)  HR: 80 (05 Mar 2023 09:03) (74 - 80)  BP: 134/76 (05 Mar 2023 09:03) (134/76 - 140/77)  BP(mean): --  RR: 16 (05 Mar 2023 09:03) (16 - 16)  SpO2: 95% (05 Mar 2023 09:03) (94% - 95%)    Parameters below as of 05 Mar 2023 09:03  Patient On (Oxygen Delivery Method): room air          PHYSICAL EXAM:  General: NAD, Resting Comfortable,                                  HEENT: NC/AT, EOM I, PERRLA, Normal Conjunctivae  Cardio: RRR, Normal S1-S2, No M/G/R                              Pulm: No Respiratory Distress,  Lungs CTAB                        Abdomen: ND/NT, Soft, BS+                                                MSK: No joint swelling, Right shoulder Frozen                                        Ext: 1+ edema, No calf tenderness    Skin:  Left lower leg with scabs, no erythema                                                               Neuro - AAOx3, SILT, LLE eversion/DF weakness sl improved      # Case discussed at IDT rounds 2/28    SW: Patient lives alone in a private home  Family lives in [place], though patient has friends nearby who are able to help her.   1 ANDIE with 1F setup inside    OT: Limited at times by pain  UBD/LBD- Min/Total  Transfers - Total A    PT:  Amb- 8 steps with RW and WC follow Min-Mod assist  Transfers- Min-Mod assist    Discharge Planning- 3/7 [facility].

## 2023-03-05 NOTE — DISCHARGE NOTE PROVIDER - CARE PROVIDER_API CALL
YEE Pandya)  Orthopaedic Surgery  [address], [address]  [place] [address]  Phone: [phone]  Fax: [phone]  Follow Up Time:     Loren Nova)  Internal Medicine  [address] [place] [address]  Phone: [phone]  Fax: [phone]  Follow Up Time:     Erick Hernandez)  PhysicalRehab Medicine  [address] [place] [address]  Phone: [phone]  Fax: [phone]  Follow Up Time: 1 month

## 2023-03-05 NOTE — DISCHARGE NOTE PROVIDER - NSDCMRMEDTOKEN_GEN_ALL_CORE_FT
acetaminophen 325 mg oral tablet: 2 tab(s) orally every 6 hours, As needed, Temp greater or equal to 38C (100.4F), Mild Pain (1 - 3)  amLODIPine 5 mg oral tablet: 1 tab(s) orally once a day  calcium-vitamin D 500 mg-5 mcg (200 intl units) oral tablet: 1 tab(s) orally once a day  cephalexin 500 mg oral capsule: 1 cap(s) orally every 12 hours  enoxaparin: 40 milligram(s) subcutaneous once a day  latanoprost 0.005% ophthalmic solution: 1 drop(s) to each affected eye once a day (in the evening)  Multiple Vitamins oral tablet: 1 tab(s) orally once a day  mupirocin 2% topical ointment: 1 application topically 2 times a day  oxycodone-acetaminophen 5 mg-325 mg oral tablet: 1 tab(s) orally every 4 hours, As needed, Severe Pain (7 - 10)  polyethylene glycol 3350 oral powder for reconstitution: 17 gram(s) orally once a day  senna leaf extract oral tablet: 2 tab(s) orally once a day  triamcinolone 0.1% topical ointment: 1 application topically 2 times a day   acetaminophen 325 mg oral tablet: 2 tab(s) orally every 6 hours, As needed, Temp greater or equal to 38C (100.4F), Mild Pain (1 - 3)  amLODIPine 5 mg oral tablet: 1 tab(s) orally once a day  bisacodyl 10 mg rectal suppository: 1 suppository(ies) rectal once a day, As needed, Constipation  calcium-vitamin D 500 mg-5 mcg (200 intl units) oral tablet: 1 tab(s) orally once a day  diclofenac 1% topical gel: Apply topically to affected area once a day (at bedtime), As Needed shoulder pain  enoxaparin: 40 milligram(s) subcutaneous once a day  gabapentin 100 mg oral capsule: 1 cap(s) orally 3 times a day  ibuprofen 400 mg oral tablet: 1 tab(s) orally 3 times a day, As needed, Moderate Pain (4 - 6)  latanoprost 0.005% ophthalmic solution: 1 drop(s) to each affected eye every 24 hours  lidocaine 4% topical film: Apply topically to affected area once a day  Multiple Vitamins oral tablet: 1 tab(s) orally once a day  mupirocin 2% topical ointment: 1 application topically 2 times a day  oxycodone-acetaminophen 5 mg-325 mg oral tablet: 1 tab(s) orally every 4 hours, As needed, Severe Pain (7 - 10)  polyethylene glycol 3350 oral powder for reconstitution: 17 gram(s) orally 2 times a day  senna leaf extract oral tablet: 2 tab(s) orally once a day (at bedtime)  triamcinolone 0.1% topical ointment: 1 application topically 2 times a day  vitamin A and D topical ointment: 1 application topically once a day

## 2023-03-05 NOTE — DISCHARGE NOTE PROVIDER - PROVIDER TOKENS
PROVIDER:[TOKEN:[[id number]]],PROVIDER:[TOKEN:[[id number]]],PROVIDER:[TOKEN:[[id number]],FOLLOWUP:[1 month]]

## 2023-03-05 NOTE — DISCHARGE NOTE PROVIDER - CARE PROVIDERS DIRECT ADDRESSES
[email or web address]

## 2023-03-05 NOTE — PROGRESS NOTE ADULT - ASSESSMENT
This is a 89-year-old woman with hypertension, glaucoma, arthritis, presents to [facility] ED on 2/21 with c/o worsened low back pain and difficulty ambulating for the past 2-3 days s/p falls x 2 at home about 2.5 weeks apart. She was found to have acute, nondisplaced, minimally impacted sacral fracture, no surgical intervention. Patient now with gait Instability, ADL impairments and Functional impairments.    #Sacral Fracture  - No surgical intervention  - Comprehensive Rehab Program: PT/OT, 3hours daily and 5 days weekly  - PT: Focused on improving strength, endurance, coordination, balance, functional mobility, and transfers  - OT: Focused on improving strength, fine motor skills, coordination, posture and ADLs.    - WB Status: WBAT  management- percoset PRN, add lidoderm, ICE- low dose gabapentin started- ongoing improvement in LE burning sensation     #HTN  - Amlodipine 5mg daily    #Cellulitis  - Keflex 500mg q 12 hrs- finished 7 days - to DC  - Topical Triamcinolone BID to lower legs with Mupirocin PRN to open wounds    #Glaucoma  - Xalatan opthal    HELDER  BUN 38>29 >30 3/2  encourage PO fluids  check CMP in AM      #Pain management  - Tylenol PRN, ibuprofen, Oxycodone PRN, [id number] 3x/day added    #DVT ppx  - Lovenox,     #Bowel Regimen  - Senna, miralax   Moved Bowels 3/3    #Bladder management  - BS on admission, and q 8 hours (SC if > 400)  - Monitor UO    #FEN   - Diet: Renal   - Supplements: oscal D     #Skin:  - Skin on admission: intact  - Pressure injury/Skin: Turn Q2hrs while in bed, OOB to Chair, PT/OT     #Sleep:   - Maintain quiet hours and low stim environment.  - Melatonin PRN to maximize participation in therapy during the day.       #Precaution  - Fall, Aspiration    #GOC  CODE STATUS: FULL CODE

## 2023-03-05 NOTE — DISCHARGE NOTE PROVIDER - NSDCCPCAREPLAN_GEN_ALL_CORE_FT
PRINCIPAL DISCHARGE DIAGNOSIS  Diagnosis: Sacral fracture  Assessment and Plan of Treatment: You were admitted to [place] for rehabilitation following your hospitalization, and you received physical and occupational therapy during your admission. Please continue taking your medications as prescribed and follow up with your Orthopedist, PM&R and PCP following your discharge from rehab.

## 2023-03-06 LAB
ALBUMIN SERPL ELPH-MCNC: 2.5 G/DL — LOW (ref 3.3–5)
ALP SERPL-CCNC: 166 U/L — HIGH (ref 40–120)
ALT FLD-CCNC: 28 U/L — SIGNIFICANT CHANGE UP (ref 10–45)
ANION GAP SERPL CALC-SCNC: 6 MMOL/L — SIGNIFICANT CHANGE UP (ref 5–17)
AST SERPL-CCNC: 35 U/L — SIGNIFICANT CHANGE UP (ref 10–40)
BILIRUB SERPL-MCNC: 0.2 MG/DL — SIGNIFICANT CHANGE UP (ref 0.2–1.2)
BUN SERPL-MCNC: 26 MG/DL — HIGH (ref 7–23)
CALCIUM SERPL-MCNC: 9 MG/DL — SIGNIFICANT CHANGE UP (ref 8.4–10.5)
CHLORIDE SERPL-SCNC: 103 MMOL/L — SIGNIFICANT CHANGE UP (ref 96–108)
CO2 SERPL-SCNC: 27 MMOL/L — SIGNIFICANT CHANGE UP (ref 22–31)
CREAT SERPL-MCNC: 0.61 MG/DL — SIGNIFICANT CHANGE UP (ref 0.5–1.3)
EGFR: 84 ML/MIN/1.73M2 — SIGNIFICANT CHANGE UP
GLUCOSE SERPL-MCNC: 96 MG/DL — SIGNIFICANT CHANGE UP (ref 70–99)
HCT VFR BLD CALC: 33.5 % — LOW (ref 34.5–45)
HGB BLD-MCNC: 10.6 G/DL — LOW (ref 11.5–15.5)
MCHC RBC-ENTMCNC: 28 PG — SIGNIFICANT CHANGE UP (ref 27–34)
MCHC RBC-ENTMCNC: 31.6 GM/DL — LOW (ref 32–36)
MCV RBC AUTO: 88.6 FL — SIGNIFICANT CHANGE UP (ref 80–100)
NRBC # BLD: 0 /100 WBCS — SIGNIFICANT CHANGE UP (ref 0–0)
PLATELET # BLD AUTO: 394 K/UL — SIGNIFICANT CHANGE UP (ref 150–400)
POTASSIUM SERPL-MCNC: 4.2 MMOL/L — SIGNIFICANT CHANGE UP (ref 3.5–5.3)
POTASSIUM SERPL-SCNC: 4.2 MMOL/L — SIGNIFICANT CHANGE UP (ref 3.5–5.3)
PROT SERPL-MCNC: 7.2 G/DL — SIGNIFICANT CHANGE UP (ref 6–8.3)
RBC # BLD: 3.78 M/UL — LOW (ref 3.8–5.2)
RBC # FLD: 16.1 % — HIGH (ref 10.3–14.5)
SARS-COV-2 RNA SPEC QL NAA+PROBE: SIGNIFICANT CHANGE UP
SODIUM SERPL-SCNC: 136 MMOL/L — SIGNIFICANT CHANGE UP (ref 135–145)
WBC # BLD: 7.56 K/UL — SIGNIFICANT CHANGE UP (ref 3.8–10.5)
WBC # FLD AUTO: 7.56 K/UL — SIGNIFICANT CHANGE UP (ref 3.8–10.5)

## 2023-03-06 PROCEDURE — 99232 SBSQ HOSP IP/OBS MODERATE 35: CPT | Mod: GC

## 2023-03-06 RX ORDER — OXYCODONE AND ACETAMINOPHEN 5; 325 MG/1; MG/1
1 TABLET ORAL
Qty: 0 | Refills: 0 | DISCHARGE
Start: 2023-03-06

## 2023-03-06 RX ORDER — AMLODIPINE BESYLATE 2.5 MG/1
1 TABLET ORAL
Qty: 0 | Refills: 0 | DISCHARGE

## 2023-03-06 RX ORDER — ACETAMINOPHEN 500 MG
2 TABLET ORAL
Qty: 0 | Refills: 0 | DISCHARGE
Start: 2023-03-06

## 2023-03-06 RX ORDER — LATANOPROST 0.05 MG/ML
1 SOLUTION/ DROPS OPHTHALMIC; TOPICAL
Qty: 0 | Refills: 0 | DISCHARGE

## 2023-03-06 RX ORDER — GABAPENTIN 400 MG/1
1 CAPSULE ORAL
Qty: 0 | Refills: 0 | DISCHARGE
Start: 2023-03-06

## 2023-03-06 RX ORDER — SALICYLIC ACID 0.5 %
1 CLEANSER (GRAM) TOPICAL
Qty: 0 | Refills: 0 | DISCHARGE
Start: 2023-03-06

## 2023-03-06 RX ORDER — SENNA PLUS 8.6 MG/1
2 TABLET ORAL
Qty: 0 | Refills: 0 | DISCHARGE
Start: 2023-03-06

## 2023-03-06 RX ORDER — MUPIROCIN 20 MG/G
1 OINTMENT TOPICAL
Qty: 0 | Refills: 0 | DISCHARGE
Start: 2023-03-06

## 2023-03-06 RX ORDER — AMLODIPINE BESYLATE 2.5 MG/1
1 TABLET ORAL
Qty: 0 | Refills: 0 | DISCHARGE
Start: 2023-03-06

## 2023-03-06 RX ORDER — LIDOCAINE 4 G/100G
1 CREAM TOPICAL
Qty: 0 | Refills: 0 | DISCHARGE
Start: 2023-03-06

## 2023-03-06 RX ORDER — IBUPROFEN 200 MG
1 TABLET ORAL
Qty: 0 | Refills: 0 | DISCHARGE
Start: 2023-03-06

## 2023-03-06 RX ORDER — POLYETHYLENE GLYCOL 3350 17 G/17G
17 POWDER, FOR SOLUTION ORAL
Qty: 0 | Refills: 0 | DISCHARGE
Start: 2023-03-06

## 2023-03-06 RX ORDER — DICLOFENAC SODIUM 30 MG/G
1 GEL TOPICAL
Qty: 0 | Refills: 0 | DISCHARGE
Start: 2023-03-06

## 2023-03-06 RX ORDER — LATANOPROST 0.05 MG/ML
1 SOLUTION/ DROPS OPHTHALMIC; TOPICAL
Qty: 0 | Refills: 0 | DISCHARGE
Start: 2023-03-06

## 2023-03-06 RX ADMIN — GABAPENTIN 100 MILLIGRAM(S): 400 CAPSULE ORAL at 06:12

## 2023-03-06 RX ADMIN — SENNA PLUS 2 TABLET(S): 8.6 TABLET ORAL at 21:47

## 2023-03-06 RX ADMIN — Medication 1 APPLICATION(S): at 11:14

## 2023-03-06 RX ADMIN — MUPIROCIN 1 APPLICATION(S): 20 OINTMENT TOPICAL at 06:30

## 2023-03-06 RX ADMIN — Medication 1 APPLICATION(S): at 06:30

## 2023-03-06 RX ADMIN — LIDOCAINE 1 PATCH: 4 CREAM TOPICAL at 18:19

## 2023-03-06 RX ADMIN — LIDOCAINE 1 PATCH: 4 CREAM TOPICAL at 07:29

## 2023-03-06 RX ADMIN — AMLODIPINE BESYLATE 5 MILLIGRAM(S): 2.5 TABLET ORAL at 06:12

## 2023-03-06 RX ADMIN — GABAPENTIN 100 MILLIGRAM(S): 400 CAPSULE ORAL at 21:46

## 2023-03-06 RX ADMIN — ENOXAPARIN SODIUM 40 MILLIGRAM(S): 100 INJECTION SUBCUTANEOUS at 11:16

## 2023-03-06 RX ADMIN — MUPIROCIN 1 APPLICATION(S): 20 OINTMENT TOPICAL at 17:08

## 2023-03-06 RX ADMIN — Medication 1 TABLET(S): at 11:16

## 2023-03-06 RX ADMIN — GABAPENTIN 100 MILLIGRAM(S): 400 CAPSULE ORAL at 13:46

## 2023-03-06 RX ADMIN — LIDOCAINE 1 PATCH: 4 CREAM TOPICAL at 11:14

## 2023-03-06 RX ADMIN — LATANOPROST 1 DROP(S): 0.05 SOLUTION/ DROPS OPHTHALMIC; TOPICAL at 21:47

## 2023-03-06 RX ADMIN — LIDOCAINE 1 PATCH: 4 CREAM TOPICAL at 18:26

## 2023-03-06 RX ADMIN — Medication 1 APPLICATION(S): at 17:08

## 2023-03-06 RX ADMIN — LIDOCAINE 1 PATCH: 4 CREAM TOPICAL at 06:53

## 2023-03-06 NOTE — PROGRESS NOTE ADULT - ASSESSMENT
This is a 89-year-old woman with hypertension, glaucoma, arthritis, presents to [facility] ED on 2/21 with c/o worsened low back pain and difficulty ambulating for the past 2-3 days s/p falls x 2 at home about 2.5 weeks apart. She was found to have acute, nondisplaced, minimally impacted sacral fracture, no surgical intervention. Patient now with gait Instability, ADL impairments and Functional impairments.    #Sacral Fracture  - No surgical intervention  - Comprehensive Rehab Program: PT/OT, 3hours daily and 5 days weekly  - PT: Focused on improving strength, endurance, coordination, balance, functional mobility, and transfers  - OT: Focused on improving strength, fine motor skills, coordination, posture and ADLs.    - WB Status: WBAT  management- percoset PRN, add lidoderm, ICE- low dose gabapentin started- ongoing improvement in LE burning sensation     #HTN  - Amlodipine 5mg daily    #Cellulitis  - Keflex 500mg q 12 hrs- completed 7 day course  - Topical Triamcinolone BID to lower legs with Mupirocin PRN to open wounds    #Glaucoma  - Xalatan opthal    HELDER  BUN 38>29 >30 > 26 3/6  Improved  encourage PO fluids      #Pain management  - Tylenol PRN, ibuprofen, Oxycodone PRN, [id number] 3x/day added    #DVT ppx  - Lovenox,     #Bowel Regimen  - Senna, miralax   Moved Bowels 3/3    #Bladder management  - BS on admission, and q 8 hours (SC if > 400)  - Monitor UO    #FEN   - Diet: Renal   - Supplements: oscal D     #Skin:  - Skin on admission: intact  - Pressure injury/Skin: Turn Q2hrs while in bed, OOB to Chair, PT/OT     #Sleep:   - Maintain quiet hours and low stim environment.  - Melatonin PRN to maximize participation in therapy during the day.       #Precaution  - Fall, Aspiration    #GOC  CODE STATUS: FULL CODE  This is a 89-year-old woman with hypertension, glaucoma, arthritis, presents to [facility] ED on 2/21 with c/o worsened low back pain and difficulty ambulating for the past 2-3 days s/p falls x 2 at home about 2.5 weeks apart. She was found to have acute, nondisplaced, minimally impacted sacral fracture, no surgical intervention. Patient now with gait Instability, ADL impairments and Functional impairments.    #Sacral Fracture  - No surgical intervention  - Comprehensive Rehab Program: PT/OT, 3hours daily and 5 days weekly  - PT: Focused on improving strength, endurance, coordination, balance, functional mobility, and transfers  - OT: Focused on improving strength, fine motor skills, coordination, posture and ADLs.    - WB Status: WBAT  management- percoset PRN, add lidoderm, ICE- low dose gabapentin started- ongoing improvement in LE burning sensation     #HTN  - Amlodipine 5mg daily    #Cellulitis  - Keflex 500mg q 12 hrs- completed 7 day course  - Topical Triamcinolone BID to lower legs with Mupirocin PRN to open wounds    #Glaucoma  - Xalatan opthal    HELDER  BUN 38>29 >30 > 26 3/6  Improved  encourage PO fluids      #Pain management  - Tylenol PRN, ibuprofen, Oxycodone PRN, [id number] 3x/day added    #DVT ppx  - Lovenox,     #Bowel Regimen  - Senna, miralax   Moved Bowels 3/5    #Bladder management  - BS on admission, and q 8 hours (SC if > 400)  - Monitor UO    #FEN   - Diet: Renal   - Supplements: oscal D     #Skin:  - Skin on admission: intact  - Pressure injury/Skin: Turn Q2hrs while in bed, OOB to Chair, PT/OT     #Sleep:   - Maintain quiet hours and low stim environment.  - Melatonin PRN to maximize participation in therapy during the day.       #Precaution  - Fall, Aspiration    #GOC  CODE STATUS: FULL CODE

## 2023-03-07 ENCOUNTER — TRANSCRIPTION ENCOUNTER (OUTPATIENT)
Age: 88
End: 2023-03-07

## 2023-03-07 VITALS
RESPIRATION RATE: 16 BRPM | SYSTOLIC BLOOD PRESSURE: 128 MMHG | TEMPERATURE: 98 F | HEART RATE: 88 BPM | DIASTOLIC BLOOD PRESSURE: 79 MMHG | OXYGEN SATURATION: 95 %

## 2023-03-07 PROCEDURE — 99232 SBSQ HOSP IP/OBS MODERATE 35: CPT

## 2023-03-07 PROCEDURE — 85027 COMPLETE CBC AUTOMATED: CPT

## 2023-03-07 PROCEDURE — 97110 THERAPEUTIC EXERCISES: CPT

## 2023-03-07 PROCEDURE — 80053 COMPREHEN METABOLIC PANEL: CPT

## 2023-03-07 PROCEDURE — 85025 COMPLETE CBC W/AUTO DIFF WBC: CPT

## 2023-03-07 PROCEDURE — 97535 SELF CARE MNGMENT TRAINING: CPT

## 2023-03-07 PROCEDURE — 99238 HOSP IP/OBS DSCHRG MGMT 30/<: CPT | Mod: GC

## 2023-03-07 PROCEDURE — 97530 THERAPEUTIC ACTIVITIES: CPT

## 2023-03-07 PROCEDURE — 73030 X-RAY EXAM OF SHOULDER: CPT

## 2023-03-07 PROCEDURE — 97167 OT EVAL HIGH COMPLEX 60 MIN: CPT

## 2023-03-07 PROCEDURE — 97542 WHEELCHAIR MNGMENT TRAINING: CPT

## 2023-03-07 PROCEDURE — 36415 COLL VENOUS BLD VENIPUNCTURE: CPT

## 2023-03-07 PROCEDURE — 97116 GAIT TRAINING THERAPY: CPT

## 2023-03-07 PROCEDURE — 97163 PT EVAL HIGH COMPLEX 45 MIN: CPT

## 2023-03-07 PROCEDURE — 87635 SARS-COV-2 COVID-19 AMP PRB: CPT

## 2023-03-07 RX ADMIN — AMLODIPINE BESYLATE 5 MILLIGRAM(S): 2.5 TABLET ORAL at 05:56

## 2023-03-07 RX ADMIN — Medication 1 APPLICATION(S): at 12:43

## 2023-03-07 RX ADMIN — GABAPENTIN 100 MILLIGRAM(S): 400 CAPSULE ORAL at 13:16

## 2023-03-07 RX ADMIN — GABAPENTIN 100 MILLIGRAM(S): 400 CAPSULE ORAL at 05:56

## 2023-03-07 RX ADMIN — LIDOCAINE 1 PATCH: 4 CREAM TOPICAL at 00:00

## 2023-03-07 RX ADMIN — ENOXAPARIN SODIUM 40 MILLIGRAM(S): 100 INJECTION SUBCUTANEOUS at 12:42

## 2023-03-07 RX ADMIN — Medication 1 TABLET(S): at 12:41

## 2023-03-07 RX ADMIN — Medication 1 APPLICATION(S): at 06:01

## 2023-03-07 RX ADMIN — Medication 1 TABLET(S): at 12:42

## 2023-03-07 RX ADMIN — MUPIROCIN 1 APPLICATION(S): 20 OINTMENT TOPICAL at 06:01

## 2023-03-07 NOTE — PROGRESS NOTE ADULT - REASON FOR ADMISSION
sacral fracture

## 2023-03-07 NOTE — PROGRESS NOTE ADULT - SUBJECTIVE AND OBJECTIVE BOX
Patient is a 89y old  Female who presents with a chief complaint of sacral fracture (02 Mar 2023 10:28)      HPI:  This is a 89-year-old woman with hypertension, glaucoma, arthritis, presents to [facility] ED on 2/21 with c/o worsened low back pain and difficulty ambulating for the past 2-3 days.  Pt lives alone and at baseline, walks independently, with no assistive devices.  Pt states she fell on her buttocks at home, about 2 weeks prior and she did see ortho.  Pt stated the pain did improve after a week, also take Tylenol prn.  However, she fell again about 3 days prior to presentataion.  Pt was okay. until for the past 2 days, low back pain has worsened, even with minimal movement.  Pt also reports pain not relieved with Tylenol. pt started taking Advil, but last time was yesterday.  She has difficulty standing and walking around due to the low back pain. She denied numbness, tingling, she is able to move all her extremities.  She denies fever, chills, chest pain, headache, dizziness, cough, dyspnea, dysuria.  She had nml BM and urination.   CT head neg for acute hemorrhage, extra-axial collection or displaced calvarial fracture.  Cervical spine CT showed no acute fracture or traumatic subluxation. Lumbar spine CT showed bilateral sacral germania insufficiency fractures. Multilevel degenerative changes of the lumbar spine. She was seen by ortho for acute, nondisplaced, minimally impacted sacral fracture, no surgical intervention, WBAT.      (24 Feb 2023 13:22)        SUBJECTIVE/OVERNIGHT EVENTS/ROS:  Patient was seen and examined at bedside this morning. No overnight events. Reports that her pain is well controlled. (+) BM daily. Feels well this morning. Denies any chills, pain, CP, SOB, n/v, constipation. DC to [facility] today.        PAST MEDICAL & SURGICAL HISTORY:  Hypertension      History of total knee replacement  right      History of total hip arthroplasty  right with pinning      History of hip replacement, total, left          Allergies    No Known Allergies    Intolerances        MEDICATIONS  (STANDING):  amLODIPine   Tablet 5 milliGRAM(s) Oral daily  calcium carbonate 1250 mG  + Vitamin D (OsCal 500 + D) 1 Tablet(s) Oral daily  enoxaparin Injectable 40 milliGRAM(s) SubCutaneous every 24 hours  gabapentin 100 milliGRAM(s) Oral three times a day  latanoprost 0.005% Ophthalmic Solution 1 Drop(s) Both EYES every 24 hours  lidocaine   4% Patch 1 Patch Transdermal <User Schedule>  lidocaine   4% Patch 1 Patch Transdermal daily  multivitamin 1 Tablet(s) Oral daily  mupirocin 2% Ointment 1 Application(s) Topical two times a day  polyethylene glycol 3350 17 Gram(s) Oral two times a day  senna 2 Tablet(s) Oral at bedtime  triamcinolone 0.1% Ointment 1 Application(s) Topical two times a day  vitamin A &amp; D Ointment 1 Application(s) Topical daily    MEDICATIONS  (PRN):  acetaminophen     Tablet .. 650 milliGRAM(s) Oral every 6 hours PRN Temp greater or equal to 38C (100.4F), Mild Pain (1 - 3)  bisacodyl Suppository 10 milliGRAM(s) Rectal daily PRN Constipation  diclofenac sodium 1% Gel 2 Gram(s) Topical two times a day PRN shoulder pain  ibuprofen  Tablet. 400 milliGRAM(s) Oral three times a day PRN Moderate Pain (4 - 6)  oxycodone    5 mG/acetaminophen 325 mG 1 Tablet(s) Oral every 4 hours PRN Severe Pain (7 - 10)                 RECENT LABS/IMAGING                        10.6   7.56  )-----------( 394      ( 06 Mar 2023 06:07 )             33.5     03-06    136  |  103  |  26<H>  ----------------------------<  96  4.2   |  27  |  0.61    Ca    9.0      06 Mar 2023 06:07    TPro  7.2  /  Alb  2.5<L>  /  TBili  0.2  /  DBili  x   /  AST  35  /  ALT  28  /  AlkPhos  166<H>  03-06                CAPILLARY BLOOD GLUCOSE          Vital Signs Last 24 Hrs  T(C): 36.4 (07 Mar 2023 10:35), Max: 36.6 (06 Mar 2023 21:53)  T(F): 97.6 (07 Mar 2023 10:35), Max: 97.9 (06 Mar 2023 21:53)  HR: 88 (07 Mar 2023 10:35) (47 - 88)  BP: 128/79 (07 Mar 2023 10:35) (128/71 - 145/57)  BP(mean): --  RR: 16 (07 Mar 2023 10:35) (16 - 16)  SpO2: 95% (07 Mar 2023 10:35) (94% - 95%)    Parameters below as of 06 Mar 2023 21:53  Patient On (Oxygen Delivery Method): room air          PHYSICAL EXAM:  General: NAD, Resting Comfortable                             HEENT: NC/AT, EOM I, PERRLA, Normal Conjunctivae  Cardio: RRR, Normal S1-S2, No M/G/R                              Pulm: No Respiratory Distress,  Lungs CTA in all fields                     Abdomen: ND/NT, Soft, BS+                                                MSK: No joint swelling, Right shoulder Frozen                                        Ext: 1+ edema, No calf tenderness    Skin:  Left lower leg with scabs, no erythema                                                               Neuro - AAOx3, SILT, LLE eversion/DF weakness with slight improvement      # Case discussed at IDT rounds 2/28    SW: Patient lives alone in a private home  Family lives in [place], though patient has friends nearby who are able to help her.   1 ANDIE with 1F setup inside    OT: Limited at times by pain  UBD/LBD- Min/Total  Transfers - Total A    PT:  Amb- 8 steps with RW and WC follow Min-Mod assist  Transfers- Min-Mod assist    Discharge Planning- 3/7 [facility].

## 2023-03-07 NOTE — PROGRESS NOTE ADULT - ATTENDING COMMENTS
Rehab Attending- Patient seen and examined by me - Case discussed, above note reviewed by me with modifications made    last BM 2/24- on miralax/ senna- will add dulcolax suppository  pin in low on prolonged sitting- minimal standing  noted left foot weakness in eversion/ dorsiflexion with paresthesias intermittently- no SLR  Sensation intact- no tinels at fibular head  ? symptoms being old vs new  On gabapentin- sl relief  discussed in team- tentative Dc to [facility] 3/7  to continue intensive rehab program
Rehab Attending- Patient seen and examined by me- Case discussed, above note reviewed by me with modifications made    Burning pain LEs improved  LBP with activity   ambulates approx 10' in PT  cellulitis improved- finished Keflex  labs reviewed by me- stable  to continue intensive rehab program
Rehab Attending- Patient seen and examined by me - Case discussed, above note reviewed by me with modifications made    Ready for Dc to [facility]  FU with ortho at [facility]  Continue pain management  FU PMD upon DC  FU PMR Dr Hernandez 4-6 weeks
Rehab Attending- Patient seen and examined by me - Case discussed, above note reviewed by me with modifications made    doing well  ready for Dc in AM to [facility]  back/sacral pain improving- present mostly now in moving/twisting in bed/transfers  LLE cellulitis improved- Derm improved with steroid cream/bacitacin  labs reviewed by me- stable  to continue intensive rehab program

## 2023-03-07 NOTE — PROGRESS NOTE ADULT - ASSESSMENT
This is a 89-year-old woman with hypertension, glaucoma, arthritis, presents to [facility] ED on 2/21 with c/o worsened low back pain and difficulty ambulating for the past 2-3 days s/p falls x 2 at home about 2.5 weeks apart. She was found to have acute, nondisplaced, minimally impacted sacral fracture, no surgical intervention. Patient now with gait Instability, ADL impairments and Functional impairments.    #Sacral Fracture  - No surgical intervention  - Continue rehab program at [facility]  - PT: Focused on improving strength, endurance, coordination, balance, functional mobility, and transfers  - OT: Focused on improving strength, fine motor skills, coordination, posture and ADLs.    - WB Status: WBAT  management- percoset PRN, add lidoderm, ICE- low dose gabapentin started- ongoing improvement in LE burning sensation     #HTN  - Amlodipine 5mg daily    #Cellulitis  - Keflex 500mg q 12 hrs- completed 7 day course  - Topical Triamcinolone BID to lower legs with Mupirocin PRN to open wounds    #Glaucoma  - Xalatan opthal    HELDER  BUN 38>29 >30 > 26 3/6  Improved  encourage PO fluids      #Pain management  - Tylenol PRN, ibuprofen, Oxycodone PRN, [id number] 3x/day added    #DVT ppx  - Lovenox,     #Bowel Regimen  - Senna, miralax   Moved Bowels 3/5    #Bladder management  - BS on admission, and q 8 hours (SC if > 400)  - Monitor UO    #FEN   - Diet: Renal   - Supplements: oscal D     #Skin:  - Skin on admission: intact  - Pressure injury/Skin: Turn Q2hrs while in bed, OOB to Chair, PT/OT     #Sleep:   - Maintain quiet hours and low stim environment.  - Melatonin PRN to maximize participation in therapy during the day.       #Precaution  - Fall, Aspiration    #GOC  CODE STATUS: FULL CODE

## 2023-03-07 NOTE — PROGRESS NOTE ADULT - SUBJECTIVE AND OBJECTIVE BOX
Patient is a 89y old  Female who presents with a chief complaint of sacral fracture (06 Mar 2023 13:12)      INTERVAL History of Present Illness/OVERNIGHT EVENTS: participating in PT/OT     MEDICATIONS  (STANDING):  amLODIPine   Tablet 5 milliGRAM(s) Oral daily  calcium carbonate 1250 mG  + Vitamin D (OsCal 500 + D) 1 Tablet(s) Oral daily  enoxaparin Injectable 40 milliGRAM(s) SubCutaneous every 24 hours  gabapentin 100 milliGRAM(s) Oral three times a day  latanoprost 0.005% Ophthalmic Solution 1 Drop(s) Both EYES every 24 hours  lidocaine   4% Patch 1 Patch Transdermal <User Schedule>  lidocaine   4% Patch 1 Patch Transdermal daily  multivitamin 1 Tablet(s) Oral daily  mupirocin 2% Ointment 1 Application(s) Topical two times a day  polyethylene glycol 3350 17 Gram(s) Oral two times a day  senna 2 Tablet(s) Oral at bedtime  triamcinolone 0.1% Ointment 1 Application(s) Topical two times a day  vitamin A &amp; D Ointment 1 Application(s) Topical daily    MEDICATIONS  (PRN):  acetaminophen     Tablet .. 650 milliGRAM(s) Oral every 6 hours PRN Temp greater or equal to 38C (100.4F), Mild Pain (1 - 3)  bisacodyl Suppository 10 milliGRAM(s) Rectal daily PRN Constipation  diclofenac sodium 1% Gel 2 Gram(s) Topical two times a day PRN shoulder pain  ibuprofen  Tablet. 400 milliGRAM(s) Oral three times a day PRN Moderate Pain (4 - 6)  oxycodone    5 mG/acetaminophen 325 mG 1 Tablet(s) Oral every 4 hours PRN Severe Pain (7 - 10)      Allergies    No Known Allergies    Intolerances        REVIEW OF SYSTEMS:  Other systems currently negative unless otherwise specified above.    Vital Signs Last 24 Hrs  T(C): 36.4 (07 Mar 2023 10:35), Max: 36.6 (06 Mar 2023 21:53)  T(F): 97.6 (07 Mar 2023 10:35), Max: 97.9 (06 Mar 2023 21:53)  HR: 88 (07 Mar 2023 10:35) (47 - 88)  BP: 128/79 (07 Mar 2023 10:35) (128/71 - 145/57)  BP(mean): --  RR: 16 (07 Mar 2023 10:35) (16 - 16)  SpO2: 95% (07 Mar 2023 10:35) (94% - 95%)    Parameters below as of 06 Mar 2023 21:53  Patient On (Oxygen Delivery Method): room air            PHYSICAL EXAM:  GENERAL: No apparent distress, appears stated age  EYES: Conjunctiva and sclera clear, no discharge  ENMT: Moist mucous membranes, no nasal discharge  CHEST/LUNG: Clear to auscultation bilaterally, no wheeze or rales  HEART: Regular rhythm, no rubs or gallops  ABDOMEN: Soft, Nontender, Nondistended  EXTREMITIES:  No clubbing, cyanosis or edema      LABS:      Ca    9.0        06 Mar 2023 06:07          CAPILLARY BLOOD GLUCOSE            RADIOLOGY & ADDITIONAL TESTS:      Images reviewed personally    Consultant Notes Reviewed and Care Discussed with relevant Consultants.

## 2023-03-07 NOTE — PROGRESS NOTE ADULT - ASSESSMENT
89F with HTN, arthritis, recent recurrent falls at home resulting in sacral fracture, now in acute rehab.    #Sacral fracture  -PT/OT  -Pain control  - Bowel rx    #Essential HTN  Blood pressure meds with hold parameters     #DVT ppx: Lovenox    Fall precautions    Dispo: [facility] today.

## 2023-03-07 NOTE — PROGRESS NOTE ADULT - PROVIDER SPECIALTY LIST ADULT
Physiatry
Physiatry
Rehab Medicine
Hospitalist
Physiatry
Hospitalist
Hospitalist
Physiatry
Physiatry
Rehab Medicine
Hospitalist
Physiatry
Physiatry

## 2023-03-07 NOTE — DISCHARGE NOTE NURSING/CASE MANAGEMENT/SOCIAL WORK - PATIENT PORTAL LINK FT
You can access the FollowMyHealth Patient Portal offered by [facility] by registering at the following website: [email or web address]. By joining [facility]’s FollowMyHealth portal, you will also be able to view your health information using other applications (apps) compatible with our system.

## 2023-03-13 ENCOUNTER — APPOINTMENT (OUTPATIENT)
Dept: ORTHOPEDIC SURGERY | Facility: CLINIC | Age: 88
End: 2023-03-13

## 2023-05-10 ENCOUNTER — APPOINTMENT (OUTPATIENT)
Dept: GERIATRICS | Facility: CLINIC | Age: 88
End: 2023-05-10
Payer: MEDICARE

## 2023-05-10 ENCOUNTER — NON-APPOINTMENT (OUTPATIENT)
Age: 88
End: 2023-05-10

## 2023-05-10 VITALS
RESPIRATION RATE: 14 BRPM | BODY MASS INDEX: 24.8 KG/M2 | TEMPERATURE: 97.9 F | HEART RATE: 91 BPM | HEIGHT: 67 IN | DIASTOLIC BLOOD PRESSURE: 60 MMHG | SYSTOLIC BLOOD PRESSURE: 118 MMHG | WEIGHT: 158 LBS | OXYGEN SATURATION: 99 %
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
## 2023-05-10 PROCEDURE — 99204 OFFICE O/P NEW MOD 45 MIN: CPT

## 2023-05-10 RX ORDER — ALENDRONATE SODIUM 70 MG/1
70 TABLET ORAL
Qty: 1 | Refills: 3 | Status: ACTIVE | COMMUNITY
Start: 2023-05-10 | End: 1900-01-01

## 2023-05-10 NOTE — HISTORY OF PRESENT ILLNESS
[FreeTextEntry1] : 89yoF with pmhx of HTN, arthritis, OP seen as new patient for evaluation\par \par fell in feb with sacral fracture s/p hospitalization and acute rehab then [facility]  stay\par recently came home\par hx of right hip pinning after falling on ramp\par states she used to take fosamax without problems\par denies any upcoming dental work\par \par b/l shoulder OA: with limited ROM\par \par functional status:\par walking with walker without pain\par currently working with PT at home\par \par denies trouble with her memory\par denies sleep distrubane or mood disturbance\par \par follows with opthal\par \par medications\par amlodipine 5mg\par b12\par d3\par latanopost\par triamcinoline ointment on left leg\par \par skin: 1 year of left leg lesion\par saw derm with [facility] has been using steroid on and off \par \par has \par cleaning help in house\par gets help with grocery shopping\par cooks herself\par sends out laundry\par \par \par today she states Demian Fish is her HCP:  younger brother lives in [place]\par she has form at home\par different from what she said in hospital\par will need to follow up\par \par \par occupation: \par \par

## 2023-05-10 NOTE — PHYSICAL EXAM
[Alert] : alert [No Acute Distress] : in no acute distress [Normal Voice/Communication] : normal voice/communication [Sclera] : the sclera and conjunctiva were normal [EOMI] : extraocular movements were intact [PERRL] : pupils were equal in size, round, and reactive to light [Normal Appearance] : the appearance of the neck was normal [Thyroid Not Enlarged] : the thyroid was not enlarged [Supple] : the neck was supple [No Respiratory Distress] : no respiratory distress [No Acc Muscle Use] : no accessory muscle use [Respiration, Rhythm And Depth] : normal respiratory rhythm and effort [Auscultation Breath Sounds / Voice Sounds] : lungs were clear to auscultation bilaterally [Normal S1, S2] : normal S1 and S2 [Heart Rate And Rhythm] : heart rate was normal and rhythm regular [Abdomen Tenderness] : non-tender [Abdomen Soft] : soft [Cervical Lymph Nodes Enlarged Posterior Bilaterally] : posterior cervical [Supraclavicular Lymph Nodes Enlarged Bilaterally] : supraclavicular [Cervical Lymph Nodes Enlarged Anterior Bilaterally] : anterior cervical, supraclavicular [No Spinal Tenderness] : no spinal tenderness [No Clubbing, Cyanosis] : no clubbing or cyanosis of the fingernails [Involuntary Movements] : no involuntary movements were seen [No Focal Deficits] : no focal deficits [Normal Affect] : the affect was normal [Normal Mood] : the mood was normal [de-identified] : left leg slightly more edema than right, chronic [de-identified] : difficulty standing from chair [de-identified] : multiple scabs crsuts on left lower leg, left 2nd toe with small cut, no discharge

## 2023-05-10 NOTE — REVIEW OF SYSTEMS
[Fever] : no fever [Eyesight Problems] : no eyesight problems [Loss Of Hearing] : no hearing loss [As Noted in HPI] : as noted in HPI [Negative] : Heme/Lymph

## 2023-05-10 NOTE — ASSESSMENT
[FreeTextEntry1] : Osteoporosis\par labwork reviewed from rehab, with normal kidney function\par will check vit D levels\par recommend starting fosamax given fragility fracture pending labwork\par c/w PT at home\par walker use\par fall risk remains high with limited ROM of b/l shoulders due to OA\par she lives alone and does not have life alert and declining today- will readdress at follow up visit\par \par skin lesion: \par start mupirocin bid \par lachydrin to upper legs\par skin lesions - will need dermatology follow up\par \par cut on toe: cleansed today, advised follow up with podiatry within 1 week\par \par anemia:\par on labs of ~10 from hospital\par repeat levels with home draw\par \par \par

## 2023-05-15 ENCOUNTER — LABORATORY RESULT (OUTPATIENT)
Age: 88
End: 2023-05-15

## 2023-06-09 ENCOUNTER — APPOINTMENT (OUTPATIENT)
Dept: GERIATRICS | Facility: CLINIC | Age: 88
End: 2023-06-09
Payer: MEDICARE

## 2023-06-09 VITALS
RESPIRATION RATE: 18 BRPM | DIASTOLIC BLOOD PRESSURE: 70 MMHG | WEIGHT: 160 LBS | BODY MASS INDEX: 25.11 KG/M2 | HEIGHT: 67 IN | SYSTOLIC BLOOD PRESSURE: 130 MMHG | HEART RATE: 94 BPM | OXYGEN SATURATION: 97 %

## 2023-06-09 DIAGNOSIS — L98.9 DISORDER OF THE SKIN AND SUBCUTANEOUS TISSUE, UNSPECIFIED: ICD-10-CM

## 2023-06-09 DIAGNOSIS — L03.90 CELLULITIS, UNSPECIFIED: ICD-10-CM

## 2023-06-09 PROCEDURE — 99214 OFFICE O/P EST MOD 30 MIN: CPT

## 2023-06-09 RX ORDER — ZINC OXIDE 13 %
CREAM (GRAM) TOPICAL
Qty: 7 | Refills: 0 | Status: ACTIVE | COMMUNITY
Start: 2023-06-09 | End: 1900-01-01

## 2023-06-09 RX ORDER — AMLODIPINE BESYLATE 5 MG/1
5 TABLET ORAL DAILY
Qty: 90 | Refills: 2 | Status: ACTIVE | COMMUNITY
Start: 2023-06-09 | End: 1900-01-01

## 2023-06-09 NOTE — PHYSICAL EXAM
[Alert] : alert [Normal Voice/Communication] : normal voice/communication [Sclera] : the sclera and conjunctiva were normal [EOMI] : extraocular movements were intact [PERRL] : pupils were equal in size, round, and reactive to light [Normal Appearance] : the appearance of the neck was normal [Thyroid Not Enlarged] : the thyroid was not enlarged [Supple] : the neck was supple [No Respiratory Distress] : no respiratory distress [No Acc Muscle Use] : no accessory muscle use [Respiration, Rhythm And Depth] : normal respiratory rhythm and effort [No Clubbing, Cyanosis] : no clubbing or cyanosis of the fingernails [Involuntary Movements] : no involuntary movements were seen [No Focal Deficits] : no focal deficits [Normal Affect] : the affect was normal [Normal Mood] : the mood was normal [de-identified] : left leg slightly more edema than right, chronic [de-identified] : difficulty standing from chair [de-identified] : multiple scabs crsuts on left lower leg, left 2nd toe with dried corn

## 2023-06-09 NOTE — HISTORY OF PRESENT ILLNESS
[FreeTextEntry1] : 89yoF with pmhx of HTN, arthritis, OP seen for follow up\par \par states left leg has been more swollen than right chronically\par hx of chronic thickening of skin of left leg\par has seen derm in past\par no pain or calf pain\par \par fell in feb with sacral fracture s/p hospitalization and acute rehab then [facility]  stay\par recently came home\par hx of right hip pinning after falling [place] ramp\par started fosamax last visit and tolerating\par denies any upcoming dental work\par \par b/l shoulder OA: with limited ROM\par \par functional status:\par walking with walker without pain\par currently working with PT outpatient\par \par denies trouble with her memory\par denies sleep disturbance or mood disturbance\par \par follows with opthal\par \par medications\par amlodipine 5mg\par b12\par d3\par latanopost\par triamcinoline ointment on left leg\par \par skin: 1 year of left leg lesion\par saw derm with [facility] has been using steroid on and off \par \par has \par cleaning help in house\par gets help with grocery shopping\par cooks herself\par sends out laundry\par \par \par today she states Demian Fish is her HCP:  younger brother lives in [place]\par she has form at home\par different from what she said in hospital\par will need to follow up\par \par \par occupation: \par \par twice a week works for [employer], runs [employer]

## 2023-06-09 NOTE — ASSESSMENT
[FreeTextEntry1] : cellulitis:\par start keflex, c/w mupirocin and leg dressing\par she declines home care nurse\par skin lesions - will need dermatology follow up\par \par Osteoporosis\par labwork reviewed from rehab, with normal kidney function\par vit D okay\par c/w fosamax given fragility fracture \par c/w PT outpatient\par walker use\par fall risk remains high with limited ROM of b/l shoulders due to OA\par she lives alone and does not have life alert and declining today- will readdress at follow up visit\par \par \par advised to follow up with podiatry regarding the corn/lesion on her left middle toe\par \par anemia:\par resolved\par \par \par

## 2023-06-23 ENCOUNTER — APPOINTMENT (OUTPATIENT)
Dept: GERIATRICS | Facility: CLINIC | Age: 88
End: 2023-06-23
Payer: MEDICARE

## 2023-06-23 VITALS
SYSTOLIC BLOOD PRESSURE: 150 MMHG | OXYGEN SATURATION: 96 % | TEMPERATURE: 98 F | WEIGHT: 167 LBS | DIASTOLIC BLOOD PRESSURE: 75 MMHG | HEART RATE: 89 BPM | RESPIRATION RATE: 18 BRPM | HEIGHT: 67 IN | BODY MASS INDEX: 26.21 KG/M2

## 2023-06-23 DIAGNOSIS — R26.89 OTHER ABNORMALITIES OF GAIT AND MOBILITY: ICD-10-CM

## 2023-06-23 PROCEDURE — 99214 OFFICE O/P EST MOD 30 MIN: CPT

## 2023-06-23 NOTE — PHYSICAL EXAM
[Alert] : alert [No Acute Distress] : in no acute distress [Sclera] : the sclera and conjunctiva were normal [EOMI] : extraocular movements were intact [Normal Outer Ear/Nose] : the ears and nose were normal in appearance [Normal Appearance] : the appearance of the neck was normal [No Respiratory Distress] : no respiratory distress [No Acc Muscle Use] : no accessory muscle use [Respiration, Rhythm And Depth] : normal respiratory rhythm and effort [Normal Affect] : the affect was normal [Normal Mood] : the mood was normal [de-identified] : slow to rise from chair, unsteady, poor balance, walker use [de-identified] : thickened leathery skin with white and erythema with some discharge of left lower leg

## 2023-06-23 NOTE — HISTORY OF PRESENT ILLNESS
[FreeTextEntry1] : 89yof seen for follow up for lower leg dermatitis\par \par she has completed course of antibiotic since last visit with some improvement\par she continues to do the wound care herself, washing it once a day, applying mupiricin and covering it, she changes the bandage twice daily\par she denies worse pain or increased redness\par \par she has not yet seen derm, will be seeing next week\par she has not followed up with podiatry\par \par poor balance\par unsteady gait\par use of walker\par OA as well\par \par \par htn: states she is adherent with medications\par states she just came from PT today

## 2023-06-23 NOTE — ASSESSMENT
[FreeTextEntry1] : dermatitis:\par completed keflex\par c/w mupirocin and leg dressing\par she again declines home care nurse\par dermatology referral reinforced- she has appointment next week\par \par unsteady gait/poor balance:\par walker use\par c/w PT\par \par htn: slightly elevated today\par monitor\par c/w current medications\par may need increase if remains elevated \par advised low salt diet

## 2023-07-14 ENCOUNTER — APPOINTMENT (OUTPATIENT)
Dept: GERIATRICS | Facility: CLINIC | Age: 88
End: 2023-07-14
Payer: MEDICARE

## 2023-07-14 VITALS
WEIGHT: 160 LBS | SYSTOLIC BLOOD PRESSURE: 130 MMHG | BODY MASS INDEX: 25.11 KG/M2 | TEMPERATURE: 97.3 F | OXYGEN SATURATION: 96 % | DIASTOLIC BLOOD PRESSURE: 70 MMHG | HEIGHT: 67 IN | RESPIRATION RATE: 18 BRPM | HEART RATE: 77 BPM

## 2023-07-14 DIAGNOSIS — L30.9 DERMATITIS, UNSPECIFIED: ICD-10-CM

## 2023-07-14 PROCEDURE — 99214 OFFICE O/P EST MOD 30 MIN: CPT

## 2023-07-14 RX ORDER — AMMONIUM LACTATE 12 %
12 CREAM (GRAM) TOPICAL
Qty: 1 | Refills: 2 | Status: COMPLETED | COMMUNITY
Start: 2023-05-10 | End: 2023-07-14

## 2023-07-14 RX ORDER — CEPHALEXIN 500 MG/1
500 TABLET ORAL
Qty: 14 | Refills: 0 | Status: COMPLETED | COMMUNITY
Start: 2023-06-09 | End: 2023-07-14

## 2023-07-14 RX ORDER — MUPIROCIN 20 MG/G
2 OINTMENT TOPICAL
Qty: 1 | Refills: 0 | Status: COMPLETED | COMMUNITY
Start: 2023-05-10 | End: 2023-07-14

## 2023-07-14 NOTE — PHYSICAL EXAM
[Alert] : alert [Sclera] : the sclera and conjunctiva were normal [EOMI] : extraocular movements were intact [Normal Outer Ear/Nose] : the ears and nose were normal in appearance [Normal Appearance] : the appearance of the neck was normal [No Respiratory Distress] : no respiratory distress [No Acc Muscle Use] : no accessory muscle use [Respiration, Rhythm And Depth] : normal respiratory rhythm and effort [No Focal Deficits] : no focal deficits [Normal Affect] : the affect was normal [Normal Mood] : the mood was normal [de-identified] : slow to rise from chair, walker use [de-identified] : mild/ pink erythema over bilateral lower legs, no discharge [de-identified] : 5/5 hip flexion and 5/5 b/l plantar flexion/dorsiflexion

## 2023-07-14 NOTE — HISTORY OF PRESENT ILLNESS
[FreeTextEntry1] : 89yof seen for follow up for lower leg dermatitis\par \par she has completed course of antibiotic in june for cellulitis\par she continues to do the wound care herself, washing it once a day\par saw dermatology and now using betamethasone with significant improvement\par \par she has not followed up with podiatry but will make an appointment\par she will be seeing vascular up coming as well\par \par poor balance\par unsteady gait\par use of walker\par OA as well\par currently working with PT\par \par htn: denies lightheadedness, dizziness, shortness of breath, or palpitations.  adherent with medications\par \par

## 2023-07-14 NOTE — ASSESSMENT
[FreeTextEntry1] : edema of lower legs:\par leg elevation, discussed possible compression stockings\par pending visit with vascular\par \par dermatitis:\par improved\par c/w betamethasone\par \par unsteady gait/poor balance:\par walker use\par c/w PT\par \par htn:\par controlled\par c/w norvasc\par \par

## 2023-08-16 NOTE — DIETITIAN INITIAL EVALUATION ADULT - ENTER FROM (CAL/KG)
1-reviewed sleep study unable to do split night  2-continued hypersomnolence  3-wears mouth guard at night   4-wakes for often two hour period with difficulty going back to sleep  5-reviewed with Dr. Nixon  6-resume cpap use  7-trial Sunosi and let us know if tolerated   8-follow up in 3 months     25

## 2023-10-13 ENCOUNTER — APPOINTMENT (OUTPATIENT)
Dept: GERIATRICS | Facility: CLINIC | Age: 88
End: 2023-10-13
Payer: MEDICARE

## 2023-10-13 VITALS
SYSTOLIC BLOOD PRESSURE: 140 MMHG | RESPIRATION RATE: 16 BRPM | BODY MASS INDEX: 25.58 KG/M2 | HEIGHT: 67 IN | OXYGEN SATURATION: 98 % | TEMPERATURE: 99.1 F | DIASTOLIC BLOOD PRESSURE: 72 MMHG | HEART RATE: 92 BPM | WEIGHT: 163 LBS

## 2023-10-13 DIAGNOSIS — M19.90 UNSPECIFIED OSTEOARTHRITIS, UNSPECIFIED SITE: ICD-10-CM

## 2023-10-13 DIAGNOSIS — R60.0 LOCALIZED EDEMA: ICD-10-CM

## 2023-10-13 DIAGNOSIS — Z23 ENCOUNTER FOR IMMUNIZATION: ICD-10-CM

## 2023-10-13 PROCEDURE — 90662 IIV NO PRSV INCREASED AG IM: CPT

## 2023-10-13 PROCEDURE — 99214 OFFICE O/P EST MOD 30 MIN: CPT

## 2023-10-13 PROCEDURE — G0008: CPT

## 2023-10-14 ENCOUNTER — INPATIENT (INPATIENT)
Facility: HOSPITAL | Age: 88
LOS: 0 days | Discharge: OTHER INSTITUTION | DRG: 561 | End: 2023-10-15
Attending: INTERNAL MEDICINE | Admitting: INTERNAL MEDICINE
Payer: MEDICARE

## 2023-10-14 VITALS
WEIGHT: 160.06 LBS | TEMPERATURE: 98 F | SYSTOLIC BLOOD PRESSURE: 172 MMHG | HEART RATE: 83 BPM | RESPIRATION RATE: 18 BRPM | OXYGEN SATURATION: 96 % | DIASTOLIC BLOOD PRESSURE: 87 MMHG

## 2023-10-14 VITALS
RESPIRATION RATE: 18 BRPM | SYSTOLIC BLOOD PRESSURE: 153 MMHG | DIASTOLIC BLOOD PRESSURE: 82 MMHG | HEART RATE: 84 BPM | OXYGEN SATURATION: 98 %

## 2023-10-14 DIAGNOSIS — Z96.659 PRESENCE OF UNSPECIFIED ARTIFICIAL KNEE JOINT: Chronic | ICD-10-CM

## 2023-10-14 DIAGNOSIS — Z96.649 PRESENCE OF UNSPECIFIED ARTIFICIAL HIP JOINT: Chronic | ICD-10-CM

## 2023-10-14 DIAGNOSIS — Z96.642 PRESENCE OF LEFT ARTIFICIAL HIP JOINT: Chronic | ICD-10-CM

## 2023-10-14 DIAGNOSIS — M97.8XXA PERIPROSTHETIC FRACTURE AROUND OTHER INTERNAL PROSTHETIC JOINT, INITIAL ENCOUNTER: ICD-10-CM

## 2023-10-14 LAB
ALBUMIN SERPL ELPH-MCNC: 3.5 G/DL — SIGNIFICANT CHANGE UP (ref 3.3–5)
ALLERGY+IMMUNOLOGY DIAG STUDY NOTE: SIGNIFICANT CHANGE UP
ALP SERPL-CCNC: 150 U/L — HIGH (ref 40–120)
ALT FLD-CCNC: 22 U/L — SIGNIFICANT CHANGE UP (ref 10–45)
ANION GAP SERPL CALC-SCNC: 4 MMOL/L — LOW (ref 5–17)
APTT BLD: 36.4 SEC — HIGH (ref 24.5–35.6)
AST SERPL-CCNC: 20 U/L — SIGNIFICANT CHANGE UP (ref 10–40)
BASOPHILS # BLD AUTO: 0.04 K/UL — SIGNIFICANT CHANGE UP (ref 0–0.2)
BASOPHILS NFR BLD AUTO: 0.6 % — SIGNIFICANT CHANGE UP (ref 0–2)
BILIRUB SERPL-MCNC: 0.2 MG/DL — SIGNIFICANT CHANGE UP (ref 0.2–1.2)
BLD GP AB SCN SERPL QL: SIGNIFICANT CHANGE UP
BUN SERPL-MCNC: 40 MG/DL — HIGH (ref 7–23)
CALCIUM SERPL-MCNC: 9.4 MG/DL — SIGNIFICANT CHANGE UP (ref 8.4–10.5)
CHLORIDE SERPL-SCNC: 104 MMOL/L — SIGNIFICANT CHANGE UP (ref 96–108)
CK SERPL-CCNC: 92 U/L — SIGNIFICANT CHANGE UP (ref 25–170)
CO2 SERPL-SCNC: 30 MMOL/L — SIGNIFICANT CHANGE UP (ref 22–31)
CREAT SERPL-MCNC: 0.87 MG/DL — SIGNIFICANT CHANGE UP (ref 0.5–1.3)
DIR ANTIGLOB POLYSPECIFIC INTERPRETATION: ABNORMAL
EGFR: 63 ML/MIN/1.73M2 — SIGNIFICANT CHANGE UP
EOSINOPHIL # BLD AUTO: 0.11 K/UL — SIGNIFICANT CHANGE UP (ref 0–0.5)
EOSINOPHIL NFR BLD AUTO: 1.7 % — SIGNIFICANT CHANGE UP (ref 0–6)
GLUCOSE SERPL-MCNC: 153 MG/DL — HIGH (ref 70–99)
HCT VFR BLD CALC: 34.5 % — SIGNIFICANT CHANGE UP (ref 34.5–45)
HGB BLD-MCNC: 11.1 G/DL — LOW (ref 11.5–15.5)
IMM GRANULOCYTES NFR BLD AUTO: 0.6 % — SIGNIFICANT CHANGE UP (ref 0–0.9)
INR BLD: 0.95 RATIO — SIGNIFICANT CHANGE UP (ref 0.85–1.18)
LACTATE SERPL-SCNC: 1.2 MMOL/L — SIGNIFICANT CHANGE UP (ref 0.7–2)
LYMPHOCYTES # BLD AUTO: 1 K/UL — SIGNIFICANT CHANGE UP (ref 1–3.3)
LYMPHOCYTES # BLD AUTO: 15.4 % — SIGNIFICANT CHANGE UP (ref 13–44)
MCHC RBC-ENTMCNC: 29.1 PG — SIGNIFICANT CHANGE UP (ref 27–34)
MCHC RBC-ENTMCNC: 32.2 GM/DL — SIGNIFICANT CHANGE UP (ref 32–36)
MCV RBC AUTO: 90.3 FL — SIGNIFICANT CHANGE UP (ref 80–100)
MONOCYTES # BLD AUTO: 0.74 K/UL — SIGNIFICANT CHANGE UP (ref 0–0.9)
MONOCYTES NFR BLD AUTO: 11.4 % — SIGNIFICANT CHANGE UP (ref 2–14)
NEUTROPHILS # BLD AUTO: 4.56 K/UL — SIGNIFICANT CHANGE UP (ref 1.8–7.4)
NEUTROPHILS NFR BLD AUTO: 70.3 % — SIGNIFICANT CHANGE UP (ref 43–77)
NRBC # BLD: 0 /100 WBCS — SIGNIFICANT CHANGE UP (ref 0–0)
PLATELET # BLD AUTO: 346 K/UL — SIGNIFICANT CHANGE UP (ref 150–400)
POTASSIUM SERPL-MCNC: 4.4 MMOL/L — SIGNIFICANT CHANGE UP (ref 3.5–5.3)
POTASSIUM SERPL-SCNC: 4.4 MMOL/L — SIGNIFICANT CHANGE UP (ref 3.5–5.3)
PROT SERPL-MCNC: 8.2 G/DL — SIGNIFICANT CHANGE UP (ref 6–8.3)
PROTHROM AB SERPL-ACNC: 10.9 SEC — SIGNIFICANT CHANGE UP (ref 9.5–13)
RBC # BLD: 3.82 M/UL — SIGNIFICANT CHANGE UP (ref 3.8–5.2)
RBC # FLD: 15.6 % — HIGH (ref 10.3–14.5)
SODIUM SERPL-SCNC: 138 MMOL/L — SIGNIFICANT CHANGE UP (ref 135–145)
WBC # BLD: 6.49 K/UL — SIGNIFICANT CHANGE UP (ref 3.8–10.5)
WBC # FLD AUTO: 6.49 K/UL — SIGNIFICANT CHANGE UP (ref 3.8–10.5)

## 2023-10-14 PROCEDURE — 73502 X-RAY EXAM HIP UNI 2-3 VIEWS: CPT | Mod: 26,LT

## 2023-10-14 PROCEDURE — 73562 X-RAY EXAM OF KNEE 3: CPT | Mod: 26,LT

## 2023-10-14 PROCEDURE — 71045 X-RAY EXAM CHEST 1 VIEW: CPT | Mod: 26

## 2023-10-14 PROCEDURE — 93010 ELECTROCARDIOGRAM REPORT: CPT

## 2023-10-14 PROCEDURE — 86077 PHYS BLOOD BANK SERV XMATCH: CPT

## 2023-10-14 PROCEDURE — 99285 EMERGENCY DEPT VISIT HI MDM: CPT

## 2023-10-14 PROCEDURE — 73552 X-RAY EXAM OF FEMUR 2/>: CPT | Mod: 26,LT

## 2023-10-14 RX ORDER — ACETAMINOPHEN 500 MG
650 TABLET ORAL EVERY 6 HOURS
Refills: 0 | Status: DISCONTINUED | OUTPATIENT
Start: 2023-10-14 | End: 2023-10-15

## 2023-10-14 RX ORDER — MORPHINE SULFATE 50 MG/1
4 CAPSULE, EXTENDED RELEASE ORAL ONCE
Refills: 0 | Status: DISCONTINUED | OUTPATIENT
Start: 2023-10-14 | End: 2023-10-14

## 2023-10-14 RX ORDER — HEPARIN SODIUM 5000 [USP'U]/ML
5000 INJECTION INTRAVENOUS; SUBCUTANEOUS EVERY 8 HOURS
Refills: 0 | Status: DISCONTINUED | OUTPATIENT
Start: 2023-10-14 | End: 2023-10-15

## 2023-10-14 RX ORDER — ACETAMINOPHEN 500 MG
1000 TABLET ORAL ONCE
Refills: 0 | Status: COMPLETED | OUTPATIENT
Start: 2023-10-14 | End: 2023-10-14

## 2023-10-14 RX ADMIN — MORPHINE SULFATE 4 MILLIGRAM(S): 50 CAPSULE, EXTENDED RELEASE ORAL at 21:16

## 2023-10-14 RX ADMIN — Medication 1000 MILLIGRAM(S): at 19:52

## 2023-10-14 RX ADMIN — MORPHINE SULFATE 4 MILLIGRAM(S): 50 CAPSULE, EXTENDED RELEASE ORAL at 21:46

## 2023-10-14 RX ADMIN — HEPARIN SODIUM 5000 UNIT(S): 5000 INJECTION INTRAVENOUS; SUBCUTANEOUS at 22:34

## 2023-10-14 RX ADMIN — Medication 1000 MILLIGRAM(S): at 19:51

## 2023-10-14 RX ADMIN — Medication 400 MILLIGRAM(S): at 19:44

## 2023-10-14 NOTE — ED ADULT NURSE NOTE - NSFALLHARMRISKINTERV_ED_ALL_ED

## 2023-10-14 NOTE — ED ADULT NURSE NOTE - OBJECTIVE STATEMENT
Assumed pt care for a 89 yr old female alert and oriented x3 complaining of a fall and left sided hip pain. Pt reports pain gets worst with movement. Left lower extremity appears to be shortened and rotated outward. Pt denies any further complaints. Awaiting further disposition.

## 2023-10-14 NOTE — ED PROVIDER NOTE - CLINICAL SUMMARY MEDICAL DECISION MAKING FREE TEXT BOX
89-year-old female presents to the emergency department after a fall.  Mechanical trip and fall while she was with her friend and they were headed to a senior bed for concert.  Patient denies head injury or LOC.  She was unable to get up on her own.  Brought in by EMS for evaluation for left eye pain.  No neck pain or back pain.  No numbness or tingling.  Patient has history of total hip replacement several years ago.  She cannot recall the ortho name. No bowel or bladder incontinence. No wrist or shoulder pain.   Exam as stated.   Results reviewed. + Fracture. Discussed with Dr Camacho, ortho on call.   Can be admitted to [facility] unless pt has an ortho she wants to go to. Patient cannot recall her operating ortho. Will admit here.   d/w Dr Carrillo for admission. 89-year-old female presents to the emergency department after a fall.  Mechanical trip and fall while she was with her friend and they were headed to a senior bed for concert.  Patient denies head injury or LOC.  She was unable to get up on her own.  Brought in by EMS for evaluation for left eye pain.  No neck pain or back pain.  No numbness or tingling.  Patient has history of total hip replacement several years ago.  She cannot recall the ortho name. No bowel or bladder incontinence. No wrist or shoulder pain.   Exam as stated.   Results reviewed. + Fracture. Discussed with Dr Camacho, ortho on call.   [facility] she wants to go to. Patient cannot recall her operating ortho. Will admit here.   d/w Dr Carrillo for admission.    Dr Camacho called back. [facility] Recc txfr to [facility]. ED to ED txfr. Admission reversed and txfr facilitated.

## 2023-10-14 NOTE — ED PROVIDER NOTE - PHYSICAL EXAMINATION
General:     NAD, well-nourished, well-appearing  Eyes: PERRL, white sclera  Head:     NC/AT, EOMI  Neck:     trachea midline  Lungs:     CTA b/l  CVS:     RRR  Abd:     +BS, s/nt/nd  Ext:  left prox thigh with obv swelling/deformity. Distal pulses intact. Pt unable to range left lower extrem. on her own.   Skin: no rash  Neuro: AAOx3, no sensory/motor deficits

## 2023-10-14 NOTE — ED PROVIDER NOTE - OBJECTIVE STATEMENT
89-year-old female presents to the emergency department after a fall.  Mechanical trip and fall while she was with her friend and they were headed to a senior bed for concert.  Patient denies head injury or LOC.  She was unable to get up on her own.  Brought in by EMS for evaluation for left eye pain.  No neck pain or back pain.  No numbness or tingling.  Patient has history of total hip replacement several years ago.  She cannot recall the ortho name. No bowel or bladder incontinence. No wrist or shoulder pain.

## 2023-10-15 ENCOUNTER — INPATIENT (INPATIENT)
Facility: HOSPITAL | Age: 88
LOS: 3 days | Discharge: NURSING, REHAB OR LONG-TERM CARE | DRG: 481 | End: 2023-10-19
Attending: INTERNAL MEDICINE | Admitting: INTERNAL MEDICINE
Payer: MEDICARE

## 2023-10-15 VITALS
HEART RATE: 76 BPM | HEIGHT: 64 IN | TEMPERATURE: 98 F | DIASTOLIC BLOOD PRESSURE: 81 MMHG | WEIGHT: 160.06 LBS | RESPIRATION RATE: 17 BRPM | OXYGEN SATURATION: 95 % | SYSTOLIC BLOOD PRESSURE: 153 MMHG

## 2023-10-15 DIAGNOSIS — Z96.649 PRESENCE OF UNSPECIFIED ARTIFICIAL HIP JOINT: Chronic | ICD-10-CM

## 2023-10-15 DIAGNOSIS — Z96.659 PRESENCE OF UNSPECIFIED ARTIFICIAL KNEE JOINT: Chronic | ICD-10-CM

## 2023-10-15 DIAGNOSIS — Z96.642 PRESENCE OF LEFT ARTIFICIAL HIP JOINT: Chronic | ICD-10-CM

## 2023-10-15 DIAGNOSIS — M97.8XXA PERIPROSTHETIC FRACTURE AROUND OTHER INTERNAL PROSTHETIC JOINT, INITIAL ENCOUNTER: ICD-10-CM

## 2023-10-15 LAB
24R-OH-CALCIDIOL SERPL-MCNC: 28.8 NG/ML — LOW (ref 30–80)
ALBUMIN SERPL ELPH-MCNC: 3.3 G/DL — SIGNIFICANT CHANGE UP (ref 3.3–5)
ANION GAP SERPL CALC-SCNC: 4 MMOL/L — LOW (ref 5–17)
APTT BLD: 35.5 SEC — SIGNIFICANT CHANGE UP (ref 24.5–35.6)
BUN SERPL-MCNC: 32 MG/DL — HIGH (ref 7–23)
CALCIUM SERPL-MCNC: 8.8 MG/DL — SIGNIFICANT CHANGE UP (ref 8.5–10.1)
CHLORIDE SERPL-SCNC: 108 MMOL/L — SIGNIFICANT CHANGE UP (ref 96–108)
CO2 SERPL-SCNC: 27 MMOL/L — SIGNIFICANT CHANGE UP (ref 22–31)
CREAT SERPL-MCNC: 0.62 MG/DL — SIGNIFICANT CHANGE UP (ref 0.5–1.3)
EGFR: 84 ML/MIN/1.73M2 — SIGNIFICANT CHANGE UP
GLUCOSE SERPL-MCNC: 112 MG/DL — HIGH (ref 70–99)
HCT VFR BLD CALC: 31.3 % — LOW (ref 34.5–45)
HGB BLD-MCNC: 10.2 G/DL — LOW (ref 11.5–15.5)
INR BLD: 1.05 RATIO — SIGNIFICANT CHANGE UP (ref 0.85–1.18)
MCHC RBC-ENTMCNC: 29.5 PG — SIGNIFICANT CHANGE UP (ref 27–34)
MCHC RBC-ENTMCNC: 32.6 GM/DL — SIGNIFICANT CHANGE UP (ref 32–36)
MCV RBC AUTO: 90.5 FL — SIGNIFICANT CHANGE UP (ref 80–100)
PLATELET # BLD AUTO: 284 K/UL — SIGNIFICANT CHANGE UP (ref 150–400)
POTASSIUM SERPL-MCNC: 4.3 MMOL/L — SIGNIFICANT CHANGE UP (ref 3.5–5.3)
POTASSIUM SERPL-SCNC: 4.3 MMOL/L — SIGNIFICANT CHANGE UP (ref 3.5–5.3)
PROTHROM AB SERPL-ACNC: 11.8 SEC — SIGNIFICANT CHANGE UP (ref 9.5–13)
RBC # BLD: 3.46 M/UL — LOW (ref 3.8–5.2)
RBC # FLD: 15.6 % — HIGH (ref 10.3–14.5)
SODIUM SERPL-SCNC: 139 MMOL/L — SIGNIFICANT CHANGE UP (ref 135–145)
WBC # BLD: 9.2 K/UL — SIGNIFICANT CHANGE UP (ref 3.8–10.5)
WBC # FLD AUTO: 9.2 K/UL — SIGNIFICANT CHANGE UP (ref 3.8–10.5)

## 2023-10-15 PROCEDURE — 80048 BASIC METABOLIC PNL TOTAL CA: CPT

## 2023-10-15 PROCEDURE — 36415 COLL VENOUS BLD VENIPUNCTURE: CPT

## 2023-10-15 PROCEDURE — 86921 COMPATIBILITY TEST INCUBATE: CPT

## 2023-10-15 PROCEDURE — 72192 CT PELVIS W/O DYE: CPT | Mod: 26,MA

## 2023-10-15 PROCEDURE — 99285 EMERGENCY DEPT VISIT HI MDM: CPT | Mod: 25

## 2023-10-15 PROCEDURE — 76000 FLUOROSCOPY <1 HR PHYS/QHP: CPT

## 2023-10-15 PROCEDURE — 86880 COOMBS TEST DIRECT: CPT

## 2023-10-15 PROCEDURE — 93970 EXTREMITY STUDY: CPT | Mod: 26

## 2023-10-15 PROCEDURE — 87389 HIV-1 AG W/HIV-1&-2 AB AG IA: CPT

## 2023-10-15 PROCEDURE — 86920 COMPATIBILITY TEST SPIN: CPT

## 2023-10-15 PROCEDURE — 80053 COMPREHEN METABOLIC PANEL: CPT

## 2023-10-15 PROCEDURE — 82607 VITAMIN B-12: CPT

## 2023-10-15 PROCEDURE — 86901 BLOOD TYPING SEROLOGIC RH(D): CPT

## 2023-10-15 PROCEDURE — 86850 RBC ANTIBODY SCREEN: CPT

## 2023-10-15 PROCEDURE — 83880 ASSAY OF NATRIURETIC PEPTIDE: CPT

## 2023-10-15 PROCEDURE — 97166 OT EVAL MOD COMPLEX 45 MIN: CPT | Mod: GO

## 2023-10-15 PROCEDURE — 76376 3D RENDER W/INTRP POSTPROCES: CPT | Mod: 26

## 2023-10-15 PROCEDURE — 82550 ASSAY OF CK (CPK): CPT

## 2023-10-15 PROCEDURE — 73562 X-RAY EXAM OF KNEE 3: CPT

## 2023-10-15 PROCEDURE — 85025 COMPLETE CBC W/AUTO DIFF WBC: CPT

## 2023-10-15 PROCEDURE — 82306 VITAMIN D 25 HYDROXY: CPT

## 2023-10-15 PROCEDURE — 86140 C-REACTIVE PROTEIN: CPT

## 2023-10-15 PROCEDURE — 93005 ELECTROCARDIOGRAM TRACING: CPT

## 2023-10-15 PROCEDURE — P9016: CPT

## 2023-10-15 PROCEDURE — 85610 PROTHROMBIN TIME: CPT

## 2023-10-15 PROCEDURE — 82746 ASSAY OF FOLIC ACID SERUM: CPT

## 2023-10-15 PROCEDURE — 86900 BLOOD TYPING SEROLOGIC ABO: CPT

## 2023-10-15 PROCEDURE — 84100 ASSAY OF PHOSPHORUS: CPT

## 2023-10-15 PROCEDURE — 86870 RBC ANTIBODY IDENTIFICATION: CPT

## 2023-10-15 PROCEDURE — 83550 IRON BINDING TEST: CPT

## 2023-10-15 PROCEDURE — 97530 THERAPEUTIC ACTIVITIES: CPT | Mod: GP

## 2023-10-15 PROCEDURE — 85730 THROMBOPLASTIN TIME PARTIAL: CPT

## 2023-10-15 PROCEDURE — 97162 PT EVAL MOD COMPLEX 30 MIN: CPT | Mod: GP

## 2023-10-15 PROCEDURE — 83540 ASSAY OF IRON: CPT

## 2023-10-15 PROCEDURE — 83036 HEMOGLOBIN GLYCOSYLATED A1C: CPT

## 2023-10-15 PROCEDURE — 99285 EMERGENCY DEPT VISIT HI MDM: CPT

## 2023-10-15 PROCEDURE — 93970 EXTREMITY STUDY: CPT

## 2023-10-15 PROCEDURE — 83605 ASSAY OF LACTIC ACID: CPT

## 2023-10-15 PROCEDURE — 96374 THER/PROPH/DIAG INJ IV PUSH: CPT

## 2023-10-15 PROCEDURE — 76376 3D RENDER W/INTRP POSTPROCES: CPT

## 2023-10-15 PROCEDURE — 71045 X-RAY EXAM CHEST 1 VIEW: CPT

## 2023-10-15 PROCEDURE — 86922 COMPATIBILITY TEST ANTIGLOB: CPT

## 2023-10-15 PROCEDURE — C1889: CPT

## 2023-10-15 PROCEDURE — 82728 ASSAY OF FERRITIN: CPT

## 2023-10-15 PROCEDURE — 36430 TRANSFUSION BLD/BLD COMPNT: CPT

## 2023-10-15 PROCEDURE — 97116 GAIT TRAINING THERAPY: CPT | Mod: GP

## 2023-10-15 PROCEDURE — 80074 ACUTE HEPATITIS PANEL: CPT

## 2023-10-15 PROCEDURE — C1713: CPT

## 2023-10-15 PROCEDURE — 73502 X-RAY EXAM HIP UNI 2-3 VIEWS: CPT

## 2023-10-15 PROCEDURE — 73552 X-RAY EXAM OF FEMUR 2/>: CPT

## 2023-10-15 PROCEDURE — C1769: CPT

## 2023-10-15 PROCEDURE — 83735 ASSAY OF MAGNESIUM: CPT

## 2023-10-15 PROCEDURE — 99222 1ST HOSP IP/OBS MODERATE 55: CPT

## 2023-10-15 PROCEDURE — 72192 CT PELVIS W/O DYE: CPT | Mod: MA

## 2023-10-15 PROCEDURE — 82040 ASSAY OF SERUM ALBUMIN: CPT

## 2023-10-15 PROCEDURE — 85027 COMPLETE CBC AUTOMATED: CPT

## 2023-10-15 PROCEDURE — 84443 ASSAY THYROID STIM HORMONE: CPT

## 2023-10-15 PROCEDURE — 86703 HIV-1/HIV-2 1 RESULT ANTBDY: CPT

## 2023-10-15 RX ORDER — MORPHINE SULFATE 50 MG/1
2 CAPSULE, EXTENDED RELEASE ORAL ONCE
Refills: 0 | Status: DISCONTINUED | OUTPATIENT
Start: 2023-10-15 | End: 2023-10-15

## 2023-10-15 RX ORDER — LATANOPROST 0.05 MG/ML
1 SOLUTION/ DROPS OPHTHALMIC; TOPICAL AT BEDTIME
Refills: 0 | Status: DISCONTINUED | OUTPATIENT
Start: 2023-10-15 | End: 2023-10-19

## 2023-10-15 RX ORDER — ACETAMINOPHEN 500 MG
1000 TABLET ORAL ONCE
Refills: 0 | Status: DISCONTINUED | OUTPATIENT
Start: 2023-10-15 | End: 2023-10-19

## 2023-10-15 RX ORDER — PETROLATUM,WHITE
1 JELLY (GRAM) TOPICAL DAILY
Refills: 0 | Status: DISCONTINUED | OUTPATIENT
Start: 2023-10-15 | End: 2023-10-19

## 2023-10-15 RX ORDER — MORPHINE SULFATE 50 MG/1
4 CAPSULE, EXTENDED RELEASE ORAL ONCE
Refills: 0 | Status: DISCONTINUED | OUTPATIENT
Start: 2023-10-15 | End: 2023-10-15

## 2023-10-15 RX ORDER — AMLODIPINE BESYLATE 2.5 MG/1
5 TABLET ORAL DAILY
Refills: 0 | Status: DISCONTINUED | OUTPATIENT
Start: 2023-10-15 | End: 2023-10-19

## 2023-10-15 RX ORDER — TRANEXAMIC ACID 100 MG/ML
1000 INJECTION, SOLUTION INTRAVENOUS ONCE
Refills: 0 | Status: DISCONTINUED | OUTPATIENT
Start: 2023-10-16 | End: 2023-10-19

## 2023-10-15 RX ORDER — SODIUM CHLORIDE 9 MG/ML
500 INJECTION INTRAMUSCULAR; INTRAVENOUS; SUBCUTANEOUS ONCE
Refills: 0 | Status: COMPLETED | OUTPATIENT
Start: 2023-10-15 | End: 2023-10-15

## 2023-10-15 RX ORDER — SODIUM CHLORIDE 9 MG/ML
1000 INJECTION INTRAMUSCULAR; INTRAVENOUS; SUBCUTANEOUS
Refills: 0 | Status: DISCONTINUED | OUTPATIENT
Start: 2023-10-16 | End: 2023-10-19

## 2023-10-15 RX ORDER — MORPHINE SULFATE 50 MG/1
2 CAPSULE, EXTENDED RELEASE ORAL EVERY 6 HOURS
Refills: 0 | Status: DISCONTINUED | OUTPATIENT
Start: 2023-10-15 | End: 2023-10-17

## 2023-10-15 RX ORDER — CHOLECALCIFEROL (VITAMIN D3) 125 MCG
2000 CAPSULE ORAL AT BEDTIME
Refills: 0 | Status: DISCONTINUED | OUTPATIENT
Start: 2023-10-15 | End: 2023-10-19

## 2023-10-15 RX ORDER — ENOXAPARIN SODIUM 100 MG/ML
40 INJECTION SUBCUTANEOUS ONCE
Refills: 0 | Status: COMPLETED | OUTPATIENT
Start: 2023-10-15 | End: 2023-10-15

## 2023-10-15 RX ADMIN — MORPHINE SULFATE 2 MILLIGRAM(S): 50 CAPSULE, EXTENDED RELEASE ORAL at 17:53

## 2023-10-15 RX ADMIN — ENOXAPARIN SODIUM 40 MILLIGRAM(S): 100 INJECTION SUBCUTANEOUS at 17:13

## 2023-10-15 RX ADMIN — Medication 1 TABLET(S): at 09:13

## 2023-10-15 RX ADMIN — Medication 1 APPLICATION(S): at 17:13

## 2023-10-15 RX ADMIN — SODIUM CHLORIDE 500 MILLILITER(S): 9 INJECTION INTRAMUSCULAR; INTRAVENOUS; SUBCUTANEOUS at 02:52

## 2023-10-15 RX ADMIN — MORPHINE SULFATE 4 MILLIGRAM(S): 50 CAPSULE, EXTENDED RELEASE ORAL at 01:27

## 2023-10-15 RX ADMIN — LATANOPROST 1 DROP(S): 0.05 SOLUTION/ DROPS OPHTHALMIC; TOPICAL at 22:40

## 2023-10-15 RX ADMIN — MORPHINE SULFATE 2 MILLIGRAM(S): 50 CAPSULE, EXTENDED RELEASE ORAL at 18:05

## 2023-10-15 RX ADMIN — Medication 2000 UNIT(S): at 22:39

## 2023-10-15 RX ADMIN — AMLODIPINE BESYLATE 5 MILLIGRAM(S): 2.5 TABLET ORAL at 09:13

## 2023-10-15 NOTE — H&P ADULT - ASSESSMENT
A/P:    1.  Left hip periprosthetic fracture  -Probably need surgical fixation  -We will follow orthopedic consult  -Follow labs and EKG  -Patient has no prior cardiac history  -No chest pain no short of breath she is fairly active for her age  -RCRI score 0  -This is intermediate risk surgery  -Patient is low risk for this intermediate risk surgery  -Patient is medically optimized for the surgical intervention    2.  HTN  -on Amlodipine.    3.  SCD for DVT ppx    4.  Code status: Full code.

## 2023-10-15 NOTE — ED PROVIDER NOTE - EYES, MLM
Clear bilaterally, pupils equal, round and reactive to accomodation. Visual fields intact x 4 quadrants.

## 2023-10-15 NOTE — ED PROVIDER NOTE - DIFFERENTIAL DIAGNOSIS
Patient with left hip pain, clinical evidence of hip fx, xray at [facility] shows periprosthetic fracture, spoke with hospitalist and ortho, admission ortho follow up. Differential Diagnosis

## 2023-10-15 NOTE — H&P ADULT - HISTORY OF PRESENT ILLNESS
89-year-old Female presented to the emergency department of [facility] after a fall.  Mechanical trip and fall while she was with her friend and they were headed to a restaurant for Dinner. She fell in a parking lot.  Patient denies head injury or LOC.  She was unable to get up on her own.  Brought in by EMS for evaluation for left hip pain.  No neck pain or back pain.  No numbness or tingling.  Patient has history of total hip replacement several years ago. No bowel or bladder incontinence. No wrist or shoulder pain. Transferred  from [facility] to  due to finding of a left hip periprosthetic fracture. Accepted by ortho at . Likely operative measure to fix the fracture.

## 2023-10-15 NOTE — CONSULT NOTE ADULT - SUBJECTIVE AND OBJECTIVE BOX
89y Female presents s/p [facility] fall c/o severe L hip pain and inability to ambulate.  Pt fell in a parking lot on her way to dinner. Patient denies headstrike or LOC. Patient denies radiation of pain. Patient denies numbness/tingling/burning in the LLE. No other bone/joint complaints. Patient is a community ambulator at baseline with walker. Pt has hx of L IAN performed at [facility] and R IMN, unable to remember surgeon.    PAST MEDICAL & SURGICAL HISTORY:  Hypertension      History of total knee replacement  right      History of total hip arthroplasty  right with pinning      History of hip replacement, total, left        MEDICATIONS  (STANDING):  acetaminophen   IVPB .. 1000 milliGRAM(s) IV Intermittent once  amLODIPine   Tablet 5 milliGRAM(s) Oral daily  calcium carbonate 1250 mG  + Vitamin D (OsCal 500 + D) 1 Tablet(s) Oral daily  latanoprost 0.005% Ophthalmic Solution 1 Drop(s) Both EYES at bedtime  multivitamin 1 Tablet(s) Oral daily    MEDICATIONS  (PRN):  morphine  - Injectable 2 milliGRAM(s) IV Push every 6 hours PRN Moderate Pain (4 - 6)    Allergies    No Known Allergies    Intolerances                              11.1   6.49  )-----------( 346      ( 14 Oct 2023 19:41 )             34.5     10-14    138  |  104  |  40<H>  ----------------------------<  153<H>  4.4   |  30  |  0.87    Ca    9.4      14 Oct 2023 19:41    TPro  8.2  /  Alb  3.5  /  TBili  0.2  /  DBili  x   /  AST  20  /  ALT  22  /  AlkPhos  150<H>  10-14    PT/INR - ( 14 Oct 2023 19:41 )   PT: 10.9 sec;   INR: 0.95 ratio         PTT - ( 14 Oct 2023 19:41 )  PTT:36.4 sec    T(C): 36.3 (10-15-23 @ 03:24), Max: 36.8 (10-14-23 @ 18:51)  HR: 78 (10-15-23 @ 03:24) (76 - 84)  BP: 124/78 (10-15-23 @ 03:24) (124/78 - 172/87)  RR: 16 (10-15-23 @ 03:24) (16 - 18)  SpO2: 96% (10-15-23 @ 03:24) (95% - 98%)  Wt(kg): --    PE   LLE:  Skin intact; No ecchymosis/soft tissue swelling  Compartments soft; + TTP about hip. No TTP to knee/leg/ankle/foot   ROM lmited 2/2 pain   Unable to SLR; + Log Roll/Heel Strike  Motor intact GS/TA/FHL/EHL  SILT L2-S1  DP/PT pulses 2+    RLE/BUE:   No bony TTP; Good ROM w/o pain; Exam Unremarkable    Imaging:  XR demonstrating L hip periprosthetic [place] B3 fx    89y Female with L hip periprosthetic fx  - Pain control  - NPO/IVF  - CBC/BMP/Coags/UA/T+S  - EKG/CXR  - Medical clearance appreciated  - Plan for OR for ORIF/revision IAN  - Plan discussed w pt, in agreement w the above  - Plan discussed w attending, in agreement w the above

## 2023-10-15 NOTE — PATIENT PROFILE ADULT - FALL HARM RISK - HARM RISK INTERVENTIONS
no Assistance with ambulation/Assistance OOB with selected safe patient handling equipment/Communicate Risk of Fall with Harm to all staff/Discuss with provider need for PT consult/Monitor gait and stability/Provide patient with walking aids - walker, cane, crutches/Reinforce activity limits and safety measures with patient and family/Tailored Fall Risk Interventions/Visual Cue: Yellow wristband and red socks/Bed in lowest position, wheels locked, appropriate side rails in place/Call bell, personal items and telephone in reach/Instruct patient to call for assistance before getting out of bed or chair/Non-slip footwear when patient is out of bed/[place] to call system/Physically safe environment - no spills, clutter or unnecessary equipment/Purposeful Proactive Rounding/Room/bathroom lighting operational, light cord in reach

## 2023-10-15 NOTE — ED PROVIDER NOTE - OBJECTIVE STATEMENT
89 year old female with PMH of HTN, total left hip replacement, presents from [facility] for ortho repair and surgery due to finding of a left hip periprosthetic fracture. No other complaints. Accepted by ortho at . Likely operative measure to fix the fx.

## 2023-10-15 NOTE — ED ADULT NURSE NOTE - OBJECTIVE STATEMENT
The patient is a 89y Female complaining of fall. Pt had a mechanical fall landing on hip. Pt has hardware in hip. Pt treated at [facility]. Pt is GCS 15, HEENT clear, PERRL, A& O x4. Pt denies CP, SOB. Pt safety is maintained.

## 2023-10-15 NOTE — ED PROVIDER NOTE - CLINICAL SUMMARY MEDICAL DECISION MAKING FREE TEXT BOX
Spoke with ortho team via Dr. Sung, patient sent in from [place] to see  ortho for repair, likely operative. CT as per ortho request. Spoke with Dr. Ernandez, MS, DARLING. Hospitalist admission and ortho consult appreciated.

## 2023-10-15 NOTE — PATIENT PROFILE ADULT - NSTOBACCONEVERSMOKERY/N_GEN_A
1:55 AM Recheck on patient. Discussed with patient ED findings and plan for discharge. Patient was given ED warnings, discharge instructions, and follow up information to go home with. Patient understands and agrees with plan for discharge. Any questions have been answered.   No

## 2023-10-15 NOTE — ED ADULT NURSE NOTE - NSFALLRISKINTERV_ED_ALL_ED
Assistance OOB with selected safe patient handling equipment if applicable/Assistance with ambulation/Communicate fall risk and risk factors to all staff, patient, and family/Monitor gait and stability/Provide visual cue: yellow wristband, yellow gown, etc/Reinforce activity limits and safety measures with patient and family/Call bell, personal items and telephone in reach/Instruct patient to call for assistance before getting out of bed/chair/stretcher/Non-slip footwear applied when patient is off stretcher/[place] to call system/Physically safe environment - no spills, clutter or unnecessary equipment/Purposeful Proactive Rounding/Room/bathroom lighting operational, light cord in reach

## 2023-10-15 NOTE — ED PROVIDER NOTE - NSICDXPASTSURGICALHX_GEN_ALL_CORE_FT
"Oscar Rojas   [facility] for concerns of  Chief Complaint   Patient presents with   • Rash     pt dx with hand foot and mouth yesterday, mom states she has not seen any improvment       Mother reports rash not improving and pt acting \"more tired than usual\".  Patient awake, alert, pink, and interactive with staff.  Patient cooperative with triage assessment.     Patient not medicated prior to arrival.     Patient to lobby with parent in no apparent distress. Parent verbalizes understanding that patient is NPO until seen and cleared by ERP. Education provided about triage process; regarding acuities and possible wait time. Parent verbalizes understanding to inform staff of any new concerns or change in status.      /65   Pulse 88   Temp 36.4 °C (97.6 °F) (Temporal)   Resp 35   Wt 13.4 kg (29 lb 8.7 oz)   SpO2 96%   BMI 17.45 kg/m²     "
PAST SURGICAL HISTORY:  History of hip replacement, total, left     History of total hip arthroplasty right with pinning    History of total knee replacement right

## 2023-10-15 NOTE — ED PROVIDER NOTE - MUSCULOSKELETAL, MLM
shortened left externally rotated leg. 2+ pulses in left dp. Tender on palpation fo the left hip. Able to wiggle toes.

## 2023-10-15 NOTE — H&P ADULT - NSHPPHYSICALEXAM_GEN_ALL_CORE
T(C): 36.3 (10-15-23 @ 03:24), Max: 36.8 (10-14-23 @ 18:51)  HR: 78 (10-15-23 @ 03:24) (76 - 84)  BP: 124/78 (10-15-23 @ 03:24) (124/78 - 172/87)  RR: 16 (10-15-23 @ 03:24) (16 - 18)  SpO2: 96% (10-15-23 @ 03:24) (95% - 98%)    CONSTITUTIONAL: Well groomed, no apparent distress  EYES: PERRLA and symmetric, EOMI, No conjunctival or scleral injection, non-icteric  ENMT: Oral mucosa with moist membranes. Normal dentition; no pharyngeal injection or exudates             NECK: Supple, symmetric and without tracheal deviation   RESP: No respiratory distress, no use of accessory muscles; CTA b/l, no WRR  CV: RRR, +S1S2, no MRG; no JVD; no peripheral edema  GI: Soft, NT, ND, no rebound, no guarding; no palpable masses;   LYMPH: No cervical LAD or tenderness;  MSK: Decreased ROM due to left hip pain, normal muscle strength/tone  SKIN: No rashes or ulcers noted;   NEURO: CN II-XII intact; normal reflexes in upper and lower extremities, sensation intact in upper and lower extremities b/l to light touch   PSYCH: Appropriate insight/judgment; A+O x 3, mood and affect appropriate, recent/remote memory intact

## 2023-10-15 NOTE — ED PROVIDER NOTE - PROGRESS NOTE DETAILS
Ventura LYONS: Spoke with ortho team via Dr. Sung, patient sent in from [place] to see  ortho for repair, likely operative. CT as per ortho request. Spoke with Dr. Ernandez, MS, DARLING. Hospitalist admission and ortho consult appreciated.

## 2023-10-16 ENCOUNTER — TRANSCRIPTION ENCOUNTER (OUTPATIENT)
Age: 88
End: 2023-10-16

## 2023-10-16 LAB
A1C WITH ESTIMATED AVERAGE GLUCOSE RESULT: 5.8 % — HIGH (ref 4–5.6)
ANION GAP SERPL CALC-SCNC: 2 MMOL/L — LOW (ref 5–17)
ANION GAP SERPL CALC-SCNC: 3 MMOL/L — LOW (ref 5–17)
ANION GAP SERPL CALC-SCNC: 3 MMOL/L — LOW (ref 5–17)
APTT BLD: 39.6 SEC — HIGH (ref 24.5–35.6)
BUN SERPL-MCNC: 28 MG/DL — HIGH (ref 7–23)
BUN SERPL-MCNC: 28 MG/DL — HIGH (ref 7–23)
BUN SERPL-MCNC: 31 MG/DL — HIGH (ref 7–23)
CALCIUM SERPL-MCNC: 8.4 MG/DL — LOW (ref 8.5–10.1)
CALCIUM SERPL-MCNC: 8.4 MG/DL — LOW (ref 8.5–10.1)
CALCIUM SERPL-MCNC: 8.8 MG/DL — SIGNIFICANT CHANGE UP (ref 8.5–10.1)
CHLORIDE SERPL-SCNC: 108 MMOL/L — SIGNIFICANT CHANGE UP (ref 96–108)
CK SERPL-CCNC: 211 U/L — HIGH (ref 26–192)
CO2 SERPL-SCNC: 28 MMOL/L — SIGNIFICANT CHANGE UP (ref 22–31)
CREAT SERPL-MCNC: 0.72 MG/DL — SIGNIFICANT CHANGE UP (ref 0.5–1.3)
CREAT SERPL-MCNC: 0.78 MG/DL — SIGNIFICANT CHANGE UP (ref 0.5–1.3)
CREAT SERPL-MCNC: 0.78 MG/DL — SIGNIFICANT CHANGE UP (ref 0.5–1.3)
EGFR: 71 ML/MIN/1.73M2 — SIGNIFICANT CHANGE UP
EGFR: 71 ML/MIN/1.73M2 — SIGNIFICANT CHANGE UP
EGFR: 78 ML/MIN/1.73M2 — SIGNIFICANT CHANGE UP
ESTIMATED AVERAGE GLUCOSE: 120 MG/DL — HIGH (ref 68–114)
FERRITIN SERPL-MCNC: 105 NG/ML — SIGNIFICANT CHANGE UP (ref 13–330)
FOLATE SERPL-MCNC: 9.5 NG/ML — SIGNIFICANT CHANGE UP
GLUCOSE SERPL-MCNC: 120 MG/DL — HIGH (ref 70–99)
GLUCOSE SERPL-MCNC: 159 MG/DL — HIGH (ref 70–99)
GLUCOSE SERPL-MCNC: 159 MG/DL — HIGH (ref 70–99)
HCT VFR BLD CALC: 27.3 % — LOW (ref 34.5–45)
HCT VFR BLD CALC: 27.3 % — LOW (ref 34.5–45)
HCT VFR BLD CALC: 31.2 % — LOW (ref 34.5–45)
HGB BLD-MCNC: 10.2 G/DL — LOW (ref 11.5–15.5)
HGB BLD-MCNC: 9 G/DL — LOW (ref 11.5–15.5)
HGB BLD-MCNC: 9 G/DL — LOW (ref 11.5–15.5)
HIV 1 & 2 AB SERPL IA.RAPID: SIGNIFICANT CHANGE UP
HIV 1 & 2 AB SERPL IA.RAPID: SIGNIFICANT CHANGE UP
INR BLD: 1 RATIO — SIGNIFICANT CHANGE UP (ref 0.85–1.18)
IRON SATN MFR SERPL: 10 % — LOW (ref 14–50)
IRON SATN MFR SERPL: 27 UG/DL — LOW (ref 30–160)
MAGNESIUM SERPL-MCNC: 2.1 MG/DL — SIGNIFICANT CHANGE UP (ref 1.6–2.6)
MCHC RBC-ENTMCNC: 29.7 PG — SIGNIFICANT CHANGE UP (ref 27–34)
MCHC RBC-ENTMCNC: 30 PG — SIGNIFICANT CHANGE UP (ref 27–34)
MCHC RBC-ENTMCNC: 30 PG — SIGNIFICANT CHANGE UP (ref 27–34)
MCHC RBC-ENTMCNC: 32.7 GM/DL — SIGNIFICANT CHANGE UP (ref 32–36)
MCHC RBC-ENTMCNC: 33 GM/DL — SIGNIFICANT CHANGE UP (ref 32–36)
MCHC RBC-ENTMCNC: 33 GM/DL — SIGNIFICANT CHANGE UP (ref 32–36)
MCV RBC AUTO: 90.7 FL — SIGNIFICANT CHANGE UP (ref 80–100)
MCV RBC AUTO: 91 FL — SIGNIFICANT CHANGE UP (ref 80–100)
MCV RBC AUTO: 91 FL — SIGNIFICANT CHANGE UP (ref 80–100)
NT-PROBNP SERPL-SCNC: 68 PG/ML — SIGNIFICANT CHANGE UP (ref 0–450)
PHOSPHATE SERPL-MCNC: 2.7 MG/DL — SIGNIFICANT CHANGE UP (ref 2.5–4.5)
PLATELET # BLD AUTO: 256 K/UL — SIGNIFICANT CHANGE UP (ref 150–400)
PLATELET # BLD AUTO: 256 K/UL — SIGNIFICANT CHANGE UP (ref 150–400)
PLATELET # BLD AUTO: 276 K/UL — SIGNIFICANT CHANGE UP (ref 150–400)
POTASSIUM SERPL-MCNC: 4.3 MMOL/L — SIGNIFICANT CHANGE UP (ref 3.5–5.3)
POTASSIUM SERPL-MCNC: 4.5 MMOL/L — SIGNIFICANT CHANGE UP (ref 3.5–5.3)
POTASSIUM SERPL-MCNC: 4.5 MMOL/L — SIGNIFICANT CHANGE UP (ref 3.5–5.3)
POTASSIUM SERPL-SCNC: 4.3 MMOL/L — SIGNIFICANT CHANGE UP (ref 3.5–5.3)
POTASSIUM SERPL-SCNC: 4.5 MMOL/L — SIGNIFICANT CHANGE UP (ref 3.5–5.3)
POTASSIUM SERPL-SCNC: 4.5 MMOL/L — SIGNIFICANT CHANGE UP (ref 3.5–5.3)
PROTHROM AB SERPL-ACNC: 11.3 SEC — SIGNIFICANT CHANGE UP (ref 9.5–13)
RBC # BLD: 3 M/UL — LOW (ref 3.8–5.2)
RBC # BLD: 3 M/UL — LOW (ref 3.8–5.2)
RBC # BLD: 3.44 M/UL — LOW (ref 3.8–5.2)
RBC # FLD: 15.5 % — HIGH (ref 10.3–14.5)
RBC # FLD: 15.5 % — HIGH (ref 10.3–14.5)
RBC # FLD: 15.7 % — HIGH (ref 10.3–14.5)
SODIUM SERPL-SCNC: 138 MMOL/L — SIGNIFICANT CHANGE UP (ref 135–145)
SODIUM SERPL-SCNC: 139 MMOL/L — SIGNIFICANT CHANGE UP (ref 135–145)
SODIUM SERPL-SCNC: 139 MMOL/L — SIGNIFICANT CHANGE UP (ref 135–145)
TIBC SERPL-MCNC: 264 UG/DL — SIGNIFICANT CHANGE UP (ref 220–430)
TSH SERPL-MCNC: 1.71 UU/ML — SIGNIFICANT CHANGE UP (ref 0.34–4.82)
UIBC SERPL-MCNC: 237 UG/DL — SIGNIFICANT CHANGE UP (ref 110–370)
VIT B12 SERPL-MCNC: 1203 PG/ML — SIGNIFICANT CHANGE UP (ref 232–1245)
WBC # BLD: 10.59 K/UL — HIGH (ref 3.8–10.5)
WBC # BLD: 14.1 K/UL — HIGH (ref 3.8–10.5)
WBC # BLD: 14.1 K/UL — HIGH (ref 3.8–10.5)
WBC # FLD AUTO: 10.59 K/UL — HIGH (ref 3.8–10.5)
WBC # FLD AUTO: 14.1 K/UL — HIGH (ref 3.8–10.5)
WBC # FLD AUTO: 14.1 K/UL — HIGH (ref 3.8–10.5)

## 2023-10-16 PROCEDURE — 99232 SBSQ HOSP IP/OBS MODERATE 35: CPT

## 2023-10-16 RX ORDER — FENTANYL CITRATE 50 UG/ML
25 INJECTION INTRAVENOUS
Refills: 0 | Status: DISCONTINUED | OUTPATIENT
Start: 2023-10-16 | End: 2023-10-16

## 2023-10-16 RX ORDER — ENOXAPARIN SODIUM 100 MG/ML
40 INJECTION SUBCUTANEOUS EVERY 24 HOURS
Refills: 0 | Status: DISCONTINUED | OUTPATIENT
Start: 2023-10-17 | End: 2023-10-19

## 2023-10-16 RX ORDER — ONDANSETRON 8 MG/1
4 TABLET, FILM COATED ORAL ONCE
Refills: 0 | Status: DISCONTINUED | OUTPATIENT
Start: 2023-10-16 | End: 2023-10-16

## 2023-10-16 RX ORDER — OXYCODONE HYDROCHLORIDE 5 MG/1
5 TABLET ORAL EVERY 4 HOURS
Refills: 0 | Status: DISCONTINUED | OUTPATIENT
Start: 2023-10-16 | End: 2023-10-19

## 2023-10-16 RX ORDER — ONDANSETRON 8 MG/1
4 TABLET, FILM COATED ORAL EVERY 6 HOURS
Refills: 0 | Status: DISCONTINUED | OUTPATIENT
Start: 2023-10-16 | End: 2023-10-19

## 2023-10-16 RX ORDER — MAGNESIUM HYDROXIDE 400 MG/1
30 TABLET, CHEWABLE ORAL DAILY
Refills: 0 | Status: DISCONTINUED | OUTPATIENT
Start: 2023-10-16 | End: 2023-10-19

## 2023-10-16 RX ORDER — SODIUM CHLORIDE 9 MG/ML
1000 INJECTION, SOLUTION INTRAVENOUS
Refills: 0 | Status: DISCONTINUED | OUTPATIENT
Start: 2023-10-16 | End: 2023-10-16

## 2023-10-16 RX ADMIN — LATANOPROST 1 DROP(S): 0.05 SOLUTION/ DROPS OPHTHALMIC; TOPICAL at 22:15

## 2023-10-16 RX ADMIN — AMLODIPINE BESYLATE 5 MILLIGRAM(S): 2.5 TABLET ORAL at 10:34

## 2023-10-16 RX ADMIN — SODIUM CHLORIDE 50 MILLILITER(S): 9 INJECTION INTRAMUSCULAR; INTRAVENOUS; SUBCUTANEOUS at 00:33

## 2023-10-16 NOTE — PROGRESS NOTE ADULT - ASSESSMENT
89-year-old Female  with PMH of HTN, OA, chronic venous insufficiency  presented to the emergency department of [facility] after a fall.  Mechanical trip and fall while she was with her friend and they were headed to a restaurant for Dinner. Patient denies head injury or LOC.  Patient has history of total hip replacement several years ago. Transferred  from [facility] to  due to finding of a left hip periprosthetic fracture.    Mechanical fall resulted in Left hip periprosthetic fracture   - CT pelvis noted   - ortho team - plan for ORIF/revision IAN in am  - pain management   - IV fluids while NPO     Chronic venous insufficiency with leg edema  - dry skin - apply cream   - doppler - neg for DVT , enlarged left inguinal node likely reactive due to fracture    Vitamin D deficiency - replace daily    HTN -on Amlodipine    Mild normocytic anemia - check iron studies, B12, folate - pending    Hyperglycemia - check A1C     Pre-Surgical Evaluation medical  Clearance for surgery  Clinical cardiac predictor(s):  HTN   Surgical risk level:  Intermediate  Functional Status:  MODERATE   Revised Cardiac Risk Index (RCRI) for Pre-Operative Risk:  0 points, with 3.9  % 30 days risk of death, MI, or cardiac risk    [email or web address]  Vitals and labs, imaging , EKG  reviewed   Patient may take all  meds on the day of the surgery with a sip of water prior surgery , NPO from midnight  Patient medically optimized for surgery and there is no absolute contraindications for needed surgery          SCD for DVT ppx    Code status: Full code.   [facility] Stormy Rizo [phone]
89-year-old Female  with PMH of HTN, OA, chronic venous insufficiency  presented to the emergency department of [facility] after a fall.  Mechanical trip and fall while she was with her friend and they were headed to a restaurant for Dinner. Patient denies head injury or LOC.  Patient has history of total hip replacement several years ago. Transferred  from [facility] to  due to finding of a left hip periprosthetic fracture.    Mechanical fall resulted in Left hip periprosthetic fracture   - CT pelvis noted   - ortho team - plan for ORIF/revision IAN in am  - pain management   - IV fluids while NPO     Chronic venous insufficiency with leg edema  - dry skin - apply cream   - doppler - r/o DVT     Vitamin D deficiency - replace daily    HTN -on Amlodipine    Mild normocytic anemia - check iron studies, B12, folate    Hyperglycemia - check A1C     Pre-Surgical Evaluation medical  Clearance for surgery  Clinical cardiac predictor(s):  HTN   Surgical risk level:  Intermediate  Functional Status:  MODERATE   Revised Cardiac Risk Index (RCRI) for Pre-Operative Risk:  0 points, with 3.9  % 30 days risk of death, MI, or cardiac risk    [email or web address]-[email or web address]  Vitals and labs, imaging , EKG  reviewed  Patient may take all  meds on the day of the surgery with a sip of water prior surgery , NPO from midnight  Patient medically optimized for surgery and there is no absolute contraindications for needed surgery          SCD for DVT ppx    Code status: Full code.   [facility] Stormy Rizo [phone]

## 2023-10-16 NOTE — BRIEF OPERATIVE NOTE - ESTIMATED BLOOD LOSS
78 yo M former smoker with CHF, CAD, incidental finding of bone mets on imaging and markedly elevated PSA  Imaging also with suspicious lung lesions     Interval history:  reports he is ambulating with the walker  no new symptoms  no back pain or bone pain     s/p bronchoscopy   bronchial wash negative for malignant cells  mediastinal LN positive for carcinoma    vitals reviewed    frail, chronically ill appearing  NAD  s1s2 nml  ctab  gould cath  no edema    PAST MEDICAL & SURGICAL HISTORY:  CHF (congestive heart failure)  CAD (coronary artery disease)  HTN (hypertension)  S/P hernia surgery  History of coronary artery bypass graft: 2017 @ [facility]    Social Hx:  lives alone, with HHA  ambulates with cane  quit smoking 40 yrs ago    He denies a family history of cancer              CBC Full  -  ( 23 May 2018 06:38 )  WBC Count : 8.1 K/uL  Hemoglobin : 9.9 g/dL  Hematocrit : 29.8 %  Platelet Count - Automated : 414 K/uL  Mean Cell Volume : 82.8 fL  Mean Cell Hemoglobin : 27.5 pg  Mean Cell Hemoglobin Concentration : 33.2 g/dL      05-23    137  |  98  |  14  ----------------------------<  98  4.1   |  25  |  0.86    Ca    8.1<L>      23 May 2018 06:38  Phos  2.6     05-22  Mg     2.1     05-23        PT/INR - ( 22 May 2018 06:11 )   PT: 30.6 sec;   INR: 2.70          PTT - ( 22 May 2018 06:11 )  PTT:37.9 sec    pathology: reviewed          Imaging:   reviewed 500

## 2023-10-16 NOTE — BRIEF OPERATIVE NOTE - NSICDXBRIEFPROCEDURE_GEN_ALL_CORE_FT
PROCEDURES:  ORIF, fracture, femoral shaft, with plate and screws 16-Oct-2023 17:29:32 left David Tariq

## 2023-10-17 ENCOUNTER — TRANSCRIPTION ENCOUNTER (OUTPATIENT)
Age: 88
End: 2023-10-17

## 2023-10-17 LAB
ALBUMIN SERPL ELPH-MCNC: 2.3 G/DL — LOW (ref 3.3–5)
ALBUMIN SERPL ELPH-MCNC: 2.3 G/DL — LOW (ref 3.3–5)
ALP SERPL-CCNC: 77 U/L — SIGNIFICANT CHANGE UP (ref 40–120)
ALP SERPL-CCNC: 77 U/L — SIGNIFICANT CHANGE UP (ref 40–120)
ALT FLD-CCNC: 16 U/L — SIGNIFICANT CHANGE UP (ref 12–78)
ALT FLD-CCNC: 16 U/L — SIGNIFICANT CHANGE UP (ref 12–78)
ANION GAP SERPL CALC-SCNC: 5 MMOL/L — SIGNIFICANT CHANGE UP (ref 5–17)
ANION GAP SERPL CALC-SCNC: 5 MMOL/L — SIGNIFICANT CHANGE UP (ref 5–17)
AST SERPL-CCNC: 33 U/L — SIGNIFICANT CHANGE UP (ref 15–37)
AST SERPL-CCNC: 33 U/L — SIGNIFICANT CHANGE UP (ref 15–37)
BASOPHILS # BLD AUTO: 0.02 K/UL — SIGNIFICANT CHANGE UP (ref 0–0.2)
BASOPHILS # BLD AUTO: 0.02 K/UL — SIGNIFICANT CHANGE UP (ref 0–0.2)
BASOPHILS NFR BLD AUTO: 0.2 % — SIGNIFICANT CHANGE UP (ref 0–2)
BASOPHILS NFR BLD AUTO: 0.2 % — SIGNIFICANT CHANGE UP (ref 0–2)
BILIRUB SERPL-MCNC: 0.3 MG/DL — SIGNIFICANT CHANGE UP (ref 0.2–1.2)
BILIRUB SERPL-MCNC: 0.3 MG/DL — SIGNIFICANT CHANGE UP (ref 0.2–1.2)
BUN SERPL-MCNC: 30 MG/DL — HIGH (ref 7–23)
BUN SERPL-MCNC: 30 MG/DL — HIGH (ref 7–23)
CALCIUM SERPL-MCNC: 8.2 MG/DL — LOW (ref 8.5–10.1)
CALCIUM SERPL-MCNC: 8.2 MG/DL — LOW (ref 8.5–10.1)
CHLORIDE SERPL-SCNC: 109 MMOL/L — HIGH (ref 96–108)
CHLORIDE SERPL-SCNC: 109 MMOL/L — HIGH (ref 96–108)
CO2 SERPL-SCNC: 27 MMOL/L — SIGNIFICANT CHANGE UP (ref 22–31)
CO2 SERPL-SCNC: 27 MMOL/L — SIGNIFICANT CHANGE UP (ref 22–31)
CREAT SERPL-MCNC: 0.75 MG/DL — SIGNIFICANT CHANGE UP (ref 0.5–1.3)
CREAT SERPL-MCNC: 0.75 MG/DL — SIGNIFICANT CHANGE UP (ref 0.5–1.3)
CRP SERPL-MCNC: 188 MG/L — HIGH
CRP SERPL-MCNC: 188 MG/L — HIGH
EGFR: 75 ML/MIN/1.73M2 — SIGNIFICANT CHANGE UP
EGFR: 75 ML/MIN/1.73M2 — SIGNIFICANT CHANGE UP
EOSINOPHIL # BLD AUTO: 0 K/UL — SIGNIFICANT CHANGE UP (ref 0–0.5)
EOSINOPHIL # BLD AUTO: 0 K/UL — SIGNIFICANT CHANGE UP (ref 0–0.5)
EOSINOPHIL NFR BLD AUTO: 0 % — SIGNIFICANT CHANGE UP (ref 0–6)
EOSINOPHIL NFR BLD AUTO: 0 % — SIGNIFICANT CHANGE UP (ref 0–6)
GLUCOSE SERPL-MCNC: 132 MG/DL — HIGH (ref 70–99)
GLUCOSE SERPL-MCNC: 132 MG/DL — HIGH (ref 70–99)
HBV CORE IGM SER-ACNC: SIGNIFICANT CHANGE UP
HBV CORE IGM SER-ACNC: SIGNIFICANT CHANGE UP
HBV SURFACE AG SER-ACNC: SIGNIFICANT CHANGE UP
HBV SURFACE AG SER-ACNC: SIGNIFICANT CHANGE UP
HCT VFR BLD CALC: 24.4 % — LOW (ref 34.5–45)
HCT VFR BLD CALC: 24.4 % — LOW (ref 34.5–45)
HCV AB S/CO SERPL IA: 0.1 S/CO — SIGNIFICANT CHANGE UP (ref 0–0.99)
HCV AB S/CO SERPL IA: 0.1 S/CO — SIGNIFICANT CHANGE UP (ref 0–0.99)
HCV AB SERPL-IMP: SIGNIFICANT CHANGE UP
HCV AB SERPL-IMP: SIGNIFICANT CHANGE UP
HGB BLD-MCNC: 7.9 G/DL — LOW (ref 11.5–15.5)
HGB BLD-MCNC: 7.9 G/DL — LOW (ref 11.5–15.5)
HIV 1+2 AB+HIV1 P24 AG SERPL QL IA: SIGNIFICANT CHANGE UP
HIV 1+2 AB+HIV1 P24 AG SERPL QL IA: SIGNIFICANT CHANGE UP
IMM GRANULOCYTES NFR BLD AUTO: 0.3 % — SIGNIFICANT CHANGE UP (ref 0–0.9)
IMM GRANULOCYTES NFR BLD AUTO: 0.3 % — SIGNIFICANT CHANGE UP (ref 0–0.9)
LYMPHOCYTES # BLD AUTO: 0.88 K/UL — LOW (ref 1–3.3)
LYMPHOCYTES # BLD AUTO: 0.88 K/UL — LOW (ref 1–3.3)
LYMPHOCYTES # BLD AUTO: 8.1 % — LOW (ref 13–44)
LYMPHOCYTES # BLD AUTO: 8.1 % — LOW (ref 13–44)
MAGNESIUM SERPL-MCNC: 2.1 MG/DL — SIGNIFICANT CHANGE UP (ref 1.6–2.6)
MAGNESIUM SERPL-MCNC: 2.1 MG/DL — SIGNIFICANT CHANGE UP (ref 1.6–2.6)
MCHC RBC-ENTMCNC: 29.3 PG — SIGNIFICANT CHANGE UP (ref 27–34)
MCHC RBC-ENTMCNC: 29.3 PG — SIGNIFICANT CHANGE UP (ref 27–34)
MCHC RBC-ENTMCNC: 32.4 GM/DL — SIGNIFICANT CHANGE UP (ref 32–36)
MCHC RBC-ENTMCNC: 32.4 GM/DL — SIGNIFICANT CHANGE UP (ref 32–36)
MCV RBC AUTO: 90.4 FL — SIGNIFICANT CHANGE UP (ref 80–100)
MCV RBC AUTO: 90.4 FL — SIGNIFICANT CHANGE UP (ref 80–100)
MONOCYTES # BLD AUTO: 1.45 K/UL — HIGH (ref 0–0.9)
MONOCYTES # BLD AUTO: 1.45 K/UL — HIGH (ref 0–0.9)
MONOCYTES NFR BLD AUTO: 13.4 % — SIGNIFICANT CHANGE UP (ref 2–14)
MONOCYTES NFR BLD AUTO: 13.4 % — SIGNIFICANT CHANGE UP (ref 2–14)
NEUTROPHILS # BLD AUTO: 8.43 K/UL — HIGH (ref 1.8–7.4)
NEUTROPHILS # BLD AUTO: 8.43 K/UL — HIGH (ref 1.8–7.4)
NEUTROPHILS NFR BLD AUTO: 78 % — HIGH (ref 43–77)
NEUTROPHILS NFR BLD AUTO: 78 % — HIGH (ref 43–77)
PHOSPHATE SERPL-MCNC: 2.8 MG/DL — SIGNIFICANT CHANGE UP (ref 2.5–4.5)
PHOSPHATE SERPL-MCNC: 2.8 MG/DL — SIGNIFICANT CHANGE UP (ref 2.5–4.5)
PLATELET # BLD AUTO: 245 K/UL — SIGNIFICANT CHANGE UP (ref 150–400)
PLATELET # BLD AUTO: 245 K/UL — SIGNIFICANT CHANGE UP (ref 150–400)
POTASSIUM SERPL-MCNC: 3.8 MMOL/L — SIGNIFICANT CHANGE UP (ref 3.5–5.3)
POTASSIUM SERPL-MCNC: 3.8 MMOL/L — SIGNIFICANT CHANGE UP (ref 3.5–5.3)
POTASSIUM SERPL-SCNC: 3.8 MMOL/L — SIGNIFICANT CHANGE UP (ref 3.5–5.3)
POTASSIUM SERPL-SCNC: 3.8 MMOL/L — SIGNIFICANT CHANGE UP (ref 3.5–5.3)
PROT SERPL-MCNC: 6.3 GM/DL — SIGNIFICANT CHANGE UP (ref 6–8.3)
PROT SERPL-MCNC: 6.3 GM/DL — SIGNIFICANT CHANGE UP (ref 6–8.3)
RBC # BLD: 2.7 M/UL — LOW (ref 3.8–5.2)
RBC # BLD: 2.7 M/UL — LOW (ref 3.8–5.2)
RBC # FLD: 15.7 % — HIGH (ref 10.3–14.5)
RBC # FLD: 15.7 % — HIGH (ref 10.3–14.5)
SODIUM SERPL-SCNC: 141 MMOL/L — SIGNIFICANT CHANGE UP (ref 135–145)
SODIUM SERPL-SCNC: 141 MMOL/L — SIGNIFICANT CHANGE UP (ref 135–145)
WBC # BLD: 10.81 K/UL — HIGH (ref 3.8–10.5)
WBC # BLD: 10.81 K/UL — HIGH (ref 3.8–10.5)
WBC # FLD AUTO: 10.81 K/UL — HIGH (ref 3.8–10.5)
WBC # FLD AUTO: 10.81 K/UL — HIGH (ref 3.8–10.5)

## 2023-10-17 PROCEDURE — 99232 SBSQ HOSP IP/OBS MODERATE 35: CPT

## 2023-10-17 RX ORDER — HYDROMORPHONE HYDROCHLORIDE 2 MG/ML
0.5 INJECTION INTRAMUSCULAR; INTRAVENOUS; SUBCUTANEOUS ONCE
Refills: 0 | Status: DISCONTINUED | OUTPATIENT
Start: 2023-10-17 | End: 2023-10-19

## 2023-10-17 RX ORDER — TRAMADOL HYDROCHLORIDE 50 MG/1
50 TABLET ORAL EVERY 6 HOURS
Refills: 0 | Status: DISCONTINUED | OUTPATIENT
Start: 2023-10-17 | End: 2023-10-19

## 2023-10-17 RX ORDER — OXYCODONE HYDROCHLORIDE 5 MG/1
10 TABLET ORAL
Refills: 0 | Status: DISCONTINUED | OUTPATIENT
Start: 2023-10-17 | End: 2023-10-19

## 2023-10-17 RX ORDER — POLYETHYLENE GLYCOL 3350 17 G/17G
17 POWDER, FOR SOLUTION ORAL AT BEDTIME
Refills: 0 | Status: DISCONTINUED | OUTPATIENT
Start: 2023-10-17 | End: 2023-10-19

## 2023-10-17 RX ORDER — CEFAZOLIN SODIUM 1 G
VIAL (EA) INJECTION
Refills: 0 | Status: COMPLETED | OUTPATIENT
Start: 2023-10-17 | End: 2023-10-17

## 2023-10-17 RX ORDER — CEFAZOLIN SODIUM 1 G
2000 VIAL (EA) INJECTION EVERY 8 HOURS
Refills: 0 | Status: COMPLETED | OUTPATIENT
Start: 2023-10-17 | End: 2023-10-17

## 2023-10-17 RX ORDER — CEFAZOLIN SODIUM 1 G
2000 VIAL (EA) INJECTION ONCE
Refills: 0 | Status: COMPLETED | OUTPATIENT
Start: 2023-10-17 | End: 2023-10-17

## 2023-10-17 RX ORDER — SENNA PLUS 8.6 MG/1
2 TABLET ORAL AT BEDTIME
Refills: 0 | Status: DISCONTINUED | OUTPATIENT
Start: 2023-10-17 | End: 2023-10-19

## 2023-10-17 RX ORDER — CEFAZOLIN SODIUM 1 G
2000 VIAL (EA) INJECTION ONCE
Refills: 0 | Status: DISCONTINUED | OUTPATIENT
Start: 2023-10-17 | End: 2023-10-17

## 2023-10-17 RX ORDER — ACETAMINOPHEN 500 MG
1000 TABLET ORAL ONCE
Refills: 0 | Status: COMPLETED | OUTPATIENT
Start: 2023-10-17 | End: 2023-10-17

## 2023-10-17 RX ADMIN — ENOXAPARIN SODIUM 40 MILLIGRAM(S): 100 INJECTION SUBCUTANEOUS at 05:20

## 2023-10-17 RX ADMIN — Medication 2000 MILLIGRAM(S): at 06:32

## 2023-10-17 RX ADMIN — Medication 1 TABLET(S): at 10:04

## 2023-10-17 RX ADMIN — LATANOPROST 1 DROP(S): 0.05 SOLUTION/ DROPS OPHTHALMIC; TOPICAL at 21:20

## 2023-10-17 RX ADMIN — AMLODIPINE BESYLATE 5 MILLIGRAM(S): 2.5 TABLET ORAL at 10:04

## 2023-10-17 RX ADMIN — Medication 400 MILLIGRAM(S): at 10:03

## 2023-10-17 RX ADMIN — Medication 2000 MILLIGRAM(S): at 13:48

## 2023-10-17 RX ADMIN — Medication 1000 MILLIGRAM(S): at 10:03

## 2023-10-17 RX ADMIN — Medication 1 APPLICATION(S): at 13:47

## 2023-10-17 NOTE — PHYSICAL THERAPY INITIAL EVALUATION ADULT - MODALITIES TREATMENT COMMENTS
The pt demonstrated good overall activity tolerance, responding well to therex review, transfer and ambulation tx. The pt was left sitting OOB and in a powered recliner chair with chair alarm secured, RN aware. CB, tray and phone in place. The pt was in NAD at end of tx.

## 2023-10-17 NOTE — DISCHARGE NOTE PROVIDER - CARE PROVIDER_API CALL
Todd Camacho  Orthopaedic Surgery  [address] [place] [address], [facility] [address]  [place] [address]  Phone: [phone]  Fax: [phone]  Follow Up Time:

## 2023-10-17 NOTE — CHART NOTE - NSCHARTNOTEFT_GEN_A_CORE
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 No contraindication for transfusion, will need to use blood warmer during transfusion

## 2023-10-17 NOTE — PHYSICAL THERAPY INITIAL EVALUATION ADULT - PERSONAL SAFETY AND JUDGMENT, REHAB EVAL
Second attempt at patient contact, able to reach.  Reviewed physician change for appointment on 12/13/2019 and patient agreeable, does not have preference on physician.  Reviewed due for follow-up with Dr. Rust and PSA, contact for scheduling provided to patient and patient verbalized understanding and reports he will call today to schedule.  Patient had no questions at this time.    Carmel Sprague RN BSN OCN     impaired yes

## 2023-10-17 NOTE — OCCUPATIONAL THERAPY INITIAL EVALUATION ADULT - PERTINENT HX OF CURRENT PROBLEM, REHAB EVAL
As per Hospitalist Attending HPI: 89-year-old Female  with PMH of HTN, OA, chronic venous insufficiency  presented to the emergency department of [facility] after a fall.  Mechanical trip and fall while she was with her friend and they were headed to a restaurant for Dinner. She fell in a parking lot.  Patient denies head injury or LOC.  She was unable to get up on her own.  Brought in by EMS for evaluation for left hip pain.  No neck pain or back pain.  No numbness or tingling.  Patient has history of total hip replacement several years ago. No bowel or bladder incontinence. No wrist or shoulder pain. Transferred  from [facility] to  due to finding of a left hip periprosthetic fracture. CHIEF COMPLAINT: Patient is a 48y old  Male who presents with a chief complaint of hemoptysis (2019 15:51)    Interval Events:      REVIEW OF SYSTEMS:  Constitutional:   Eyes:  ENT:  CV:  Resp:  GI:  :  MSK:  Integumentary:  Neurological:  Psychiatric:  Endocrine:  Hematologic/Lymphatic:  Allergic/Immunologic:  [ ] All other systems negative  [ ] Unable to assess ROS because ________      OBJECTIVE:  ICU Vital Signs Last 24 Hrs  T(C): 36.8 (01 May 2019 06:49), Max: 37.9 (2019 15:34)  T(F): 98.3 (01 May 2019 06:49), Max: 100.3 (2019 15:34)  HR: 100 (01 May 2019 06:49) (80 - 139)  BP: 109/59 (01 May 2019 06:49) (100/61 - 112/70)  BP(mean): --  ABP: --  ABP(mean): --  RR: 20 (01 May 2019 06:49) (18 - 20)  SpO2: 100% (01 May 2019 06:49) (94% - 100%)    30 @ 07:01  -  05-01 @ 07:00  --------------------------------------------------------  IN: 550 mL / OUT: 950 mL / NET: -400 mL    HOSPITAL MEDICATIONS:  MEDICATIONS  (STANDING):  ALBUTerol/ipratropium for Nebulization 3 milliLiter(s) Nebulizer every 6 hours  influenza   Vaccine 0.5 milliLiter(s) IntraMuscular once  metoprolol succinate ER 25 milliGRAM(s) Oral daily  piperacillin/tazobactam IVPB. 3.375 Gram(s) IV Intermittent every 8 hours  predniSONE   Tablet 5 milliGRAM(s) Oral daily    MEDICATIONS  (PRN):  acetaminophen   Tablet .. 650 milliGRAM(s) Oral every 6 hours PRN Temp greater or equal to 38C (100.4F), Moderate Pain (4 - 6)  guaiFENesin    Syrup 100 milliGRAM(s) Oral every 6 hours PRN Cough      LABS:                        10.2   13.26 )-----------( 212      ( 01 May 2019 06:37 )             34.5     05-01    136  |  93<L>  |  14  ----------------------------<  118<H>  3.9   |  30  |  1.64<H>    Ca    9.7      01 May 2019 06:37        Urinalysis Basic - ( 2019 18:35 )    Color: YELLOW / Appearance: Lt TURBID / S.020 / pH: 6.0  Gluc: NEGATIVE / Ketone: NEGATIVE  / Bili: NEGATIVE / Urobili: NORMAL   Blood: MODERATE / Protein: 50 / Nitrite: NEGATIVE   Leuk Esterase: NEGATIVE / RBC: 25-50 / WBC 6-10   Sq Epi: FEW / Non Sq Epi: x / Bacteria: NEGATIVE            MICROBIOLOGY:     RADIOLOGY:  [ ] Reviewed and interpreted by me    PULMONARY FUNCTION TESTS:    EKG: CHIEF COMPLAINT: Patient is a 48y old  Male who presents with a chief complaint of hemoptysis (2019 15:51)    Interval Events: afebrile overnight      REVIEW OF SYSTEMS:  Constitutional: no complaints  CV: denies  Resp: denies  GI: denies  [x] All other systems negative  [ ] Unable to assess ROS because ________      OBJECTIVE:  ICU Vital Signs Last 24 Hrs  T(C): 36.8 (01 May 2019 06:49), Max: 37.9 (2019 15:34)  T(F): 98.3 (01 May 2019 06:49), Max: 100.3 (2019 15:34)  HR: 100 (01 May 2019 06:49) (80 - 139)  BP: 109/59 (01 May 2019 06:49) (100/61 - 112/70)  BP(mean): --  ABP: --  ABP(mean): --  RR: 20 (01 May 2019 06:49) (18 - 20)  SpO2: 100% (01 May 2019 06:49) (94% - 100%)    04-30 @ 07:01  -  05-01 @ 07:00  --------------------------------------------------------  IN: 550 mL / OUT: 950 mL / NET: -400 mL    HOSPITAL MEDICATIONS:  MEDICATIONS  (STANDING):  ALBUTerol/ipratropium for Nebulization 3 milliLiter(s) Nebulizer every 6 hours  influenza   Vaccine 0.5 milliLiter(s) IntraMuscular once  metoprolol succinate ER 25 milliGRAM(s) Oral daily  piperacillin/tazobactam IVPB. 3.375 Gram(s) IV Intermittent every 8 hours  predniSONE   Tablet 5 milliGRAM(s) Oral daily    MEDICATIONS  (PRN):  acetaminophen   Tablet .. 650 milliGRAM(s) Oral every 6 hours PRN Temp greater or equal to 38C (100.4F), Moderate Pain (4 - 6)  guaiFENesin    Syrup 100 milliGRAM(s) Oral every 6 hours PRN Cough      LABS:                        10.2   13.26 )-----------( 212      ( 01 May 2019 06:37 )             34.5     05-    136  |  93<L>  |  14  ----------------------------<  118<H>  3.9   |  30  |  1.64<H>    Ca    9.7      01 May 2019 06:37    Urinalysis Basic - ( 2019 18:35 )    Color: YELLOW / Appearance: Lt TURBID / S.020 / pH: 6.0  Gluc: NEGATIVE / Ketone: NEGATIVE  / Bili: NEGATIVE / Urobili: NORMAL   Blood: MODERATE / Protein: 50 / Nitrite: NEGATIVE   Leuk Esterase: NEGATIVE / RBC: 25-50 / WBC 6-10   Sq Epi: FEW / Non Sq Epi: x / Bacteria: NEGATIVE      MICROBIOLOGY:     Culture - Respiratory with Gram Stain (19 @ 19:26)    Gram Stain Sputum:   GNR Gram Neg Rods  QUANTITY OF BACTERIA SEEN: RARE (1+)  GPCPR^Gram Pos Cocci in Pairs  QUANTITY OF BACTERIA SEEN: RARE (1+)  WBC^White Blood Cells  QNTY CELLS IN GRAM STAIN: RARE (1+)    -  Amikacin: S <=8 FELIX    -  Aztreonam: S <=4 FELIX    -  Cefepime: S 8 FELIX    -  Ceftazidime: S 4 FELIX    -  Ciprofloxacin: R >2 FELIX    -  Gentamicin: S <=1 FELIX    -  Meropenem: S <=1 FELIX    -  Imipenem: S <=1 FELIX    -  Levofloxacin: R >4 FELIX    -  Tobramycin: S <=2 FELIX    Culture - Respiratory:   PSA^Pseudomonas aeruginosa  QUANTITY OF GROWTH: MODERATE    Culture - Respiratory:   Normal Respiratory Katherine Also Present    -  Piperacillin/Tazobactam: S    Specimen Source: SPUTUM    Organism Identification: Pseudomonas aeruginosa    Organism: Pseudomonas aeruginosa  QUANTITY OF GROWTH: MODERATE    Method Type: NEGATIVE FELIX 43    Culture - Blood (19 @ 17:01)    Culture - Blood:   NO ORGANISMS ISOLATED  NO ORGANISMS ISOLATED AT 72 HRS.    Specimen Source: BLOOD VENOUS    Culture - Blood (19 @ 16:42)    Culture - Blood:   NO ORGANISMS ISOLATED  NO ORGANISMS ISOLATED AT 72 HRS.    Specimen Source: BLOOD PERIPHERAL

## 2023-10-17 NOTE — OCCUPATIONAL THERAPY INITIAL EVALUATION ADULT - GENERAL OBSERVATIONS, REHAB EVAL
Patient received semi-supine in bed, NAD, BP:128/63   HR:80  o2:99   . Patient left seated in chair with chair alarm on    , VSS, NAD, all lines intact and all items within reach.

## 2023-10-17 NOTE — DISCHARGE NOTE PROVIDER - NSDCMRMEDTOKEN_GEN_ALL_CORE_FT
amLODIPine 5 mg oral tablet: 1 tab(s) orally once a day  calcium-vitamin D 500 mg-5 mcg (200 intl units) oral tablet: 1 tab(s) orally once a day  latanoprost 0.005% ophthalmic solution: 1 drop(s) to each affected eye every 24 hours  Multiple Vitamins oral tablet: 1 tab(s) orally once a day   amLODIPine 5 mg oral tablet: 1 tab(s) orally once a day  calcium-vitamin D 500 mg-5 mcg (200 intl units) oral tablet: 1 tab(s) orally once a day  cholecalciferol oral tablet: 2000 unit(s) orally once a day (at bedtime)  enoxaparin: 40 milligram(s) subcutaneous once a day last dose november 13, 2023 to complete 4 weeks  latanoprost 0.005% ophthalmic solution: 1 drop(s) to each affected eye every 24 hours  Multiple Vitamins oral tablet: 1 tab(s) orally once a day  oxyCODONE 5 mg oral tablet: 1 tab(s) orally every 6 hours as needed for  severe pain  polyethylene glycol 3350 oral powder for reconstitution: 17 gram(s) orally once a day  senna leaf extract oral tablet: 2 tab(s) orally once a day (at bedtime)  traMADol 50 mg oral tablet: 1 tab(s) orally every 6 hours as needed for mild to moderate pain

## 2023-10-17 NOTE — OCCUPATIONAL THERAPY INITIAL EVALUATION ADULT - ADL RETRAINING, OT EVAL
Patient will participate in lower body dressing with MOD A   in 2 weeks  Patient will participate in toilet transfer with MAX x1   in 2 weeks  Patient will participate in toileting with  MOD A    in 2 weeks  Patient will participate in grooming standing at the sink with  MAX x1  in 2 weeks

## 2023-10-17 NOTE — DISCHARGE NOTE PROVIDER - NSDCCPCAREPLAN_GEN_ALL_CORE_FT
PRINCIPAL DISCHARGE DIAGNOSIS  Diagnosis: Periprosthetic hip fracture  Assessment and Plan of Treatment:      PRINCIPAL DISCHARGE DIAGNOSIS  Diagnosis: Periprosthetic hip fracture  Assessment and Plan of Treatment: pain meds as needed  physical therapy at rehab.   see post op ortho instructions

## 2023-10-17 NOTE — DISCHARGE NOTE PROVIDER - NSDCFUADDINST_GEN_ALL_CORE_FT
Orthopedic Surgery DC Instructions:    1. ACTIVITY: Non-bearing with assistive devices as needed (i.e. cane/walker).  2. DVT/PE Prophylaxis: See Med Rec for duration and dose.  3. PHYSICAL THERAPY: You should receive physical therapy daily.  4. FOLLOW UP: Follow up outpatient with your Orthopedic Surgeon Dr. Camacho in 2 weeks from surgery.           5. STAPLES: To be removed postoperative day #14 if applied during closure  6. BANDAGE: This will be changed at your postoperative visit.  7. BATHING: Showering is allowed, however sponge baths are recommended. You must keep your dressing and splint clean/dry/and intact. Please cover splint and dressing with plastic bag/wrap to prevent dressing from becoming wet. Do NOT submerge dressing/splint in water. Please avoid baths/pools/hot tubs until cleared by your surgeon.      Discharge Instructions for ORIF Left Periprosthetic Hip Fracture:    1. PAIN CONTROL: See Med Rec.  2. ACTIVITY: Non-Weight Bearing Left Lower Extremity with assistance and rolling walker.   3. PT: daily.  4. DVT/PE PROPHYLAXIS: Continue DVT/PE Prophylaxis per primary team. See Med Rec for Duration and dose.  5. BANDAGE: Remove Prevena dressing and change to a new Mepilex Ag bandage POD7 (10/23/23). May change sooner if battery fails or dressing is saturated or falling off. DO NOT REMOVE BANDAGE TO CHECK WOUND ON INTAKE.  6. STAPLES: RN Remove Staples POD14 (10/30/23).  7. SHOWER: Sponge bathe only while Prevena in plac. Okay to shower only after Prevena dressing changed to Mepilex Ag dressing. Do not soak, submerge or let shower stream beat on Mepilex dressing/wound.  8. FOLLOW UP: Follow-up with Orthopedic Surgeon Dr. Camacho in 14 Days. Call Office For Appointment.

## 2023-10-17 NOTE — CHART NOTE - NSCHARTNOTEFT_GEN_A_CORE
Received a consult to assess "abnormal T & S". Patient had +antibody detected on 10/14/23, and unspecified cold Ab. Spoke with blood bank, as per Mr. Honeycutt, patient has compatible blood prepared to be transfused, with recommendation for using it with warmer, since the patient has cold Ab to prevent hemolysis (cold conditions activates red blood cells for destruction by patients with cold Ab). Dr. Donaldson reviewed the patient's lab results and agree with the plan to use warmer for transfusion. d/w Dr. Holguin to proceed with order to use warmer with transfusion. Provider to RN order placed requesting warmer use with blood transfusion, 2 Rita BARRIOS aware and have the warmer on the floor.     Dr. Holguin also made aware to refer patient to f/u with a hematologist as outpatient upon d/c. For in hospital management- transfusion and supportive measures. Received a consult to assess "abnormal T & S". Patient had +antibody detected on 10/14/23, but repeat on 10/15/23 was negative, also noted with unspecified cold Ab. Spoke with blood bank, as per Mr. Honeycutt, patient has compatible blood prepared to be transfused, with recommendation for using it with warmer, since the patient has cold Ab to prevent hemolysis (cold conditions activates red blood cells for destruction by patients with cold Ab). Dr. Donaldson reviewed the patient's lab results and agree with the plan to use warmer for transfusion. d/w Dr. Holguin to proceed with order to use warmer with transfusion. Provider to RN order placed requesting warmer use with blood transfusion, 2 Rita BARRIOS aware and have the warmer on the floor.     Dr. Holguin also made aware to refer patient to f/u with a hematologist as outpatient upon d/c. For in hospital management- transfusion and supportive measures. Received a consult to assess "abnormal T & S". Patient had +antibody detected on 10/14/23, but repeat on 10/15/23 was negative, also noted with unspecified cold Ab. Spoke with blood bank, as per Mr. Honeycutt, patient has compatible blood prepared to be transfused, with recommendation for using it with warmer, since the patient has cold Ab to prevent hemolysis (cold conditions activates red blood cells for destruction by patients with cold Ab). Dr. Donaldson reviewed the patient's lab results and agree with the plan to use warmer for transfusion. d/w Dr. Holguin to proceed with order to use warmer with transfusion. Provider to RN order placed requesting warmer use with blood transfusion, 2 Rita BARRIOS aware and have the warmer on the floor. No official consult needed anymore per Dr. Holguin.     Dr. Holguin also made aware to refer patient to f/u with a hematologist as outpatient upon d/c. For in hospital management- transfusion and supportive measures.

## 2023-10-17 NOTE — DISCHARGE NOTE PROVIDER - NSDCCPTREATMENT_GEN_ALL_CORE_FT
PRINCIPAL PROCEDURE  Procedure: ORIF, fracture, femoral shaft, with plate and screws  Findings and Treatment: left

## 2023-10-17 NOTE — PHYSICAL THERAPY INITIAL EVALUATION ADULT - PERTINENT HX OF CURRENT PROBLEM, REHAB EVAL
As per Hospitalist Attending HPI: 89-year-old Female  with PMH of HTN, OA, chronic venous insufficiency  presented to the emergency department of [facility] after a fall.  Mechanical trip and fall while she was with her friend and they were headed to a restaurant for Dinner. She fell in a parking lot.  Patient denies head injury or LOC.  She was unable to get up on her own.  Brought in by EMS for evaluation for left hip pain.  No neck pain or back pain.  No numbness or tingling.  Patient has history of total hip replacement several years ago. No bowel or bladder incontinence. No wrist or shoulder pain. Transferred  from [facility] to  due to finding of a left hip periprosthetic fracture.

## 2023-10-17 NOTE — OCCUPATIONAL THERAPY INITIAL EVALUATION ADULT - NSACTIVITYREC_GEN_A_OT
Pt presents with impaired balance, endurance and muscle strength that will benefit from skilled OT to improve independence in ADLs, reduce fall risk and chance for readmission

## 2023-10-17 NOTE — PHYSICAL THERAPY INITIAL EVALUATION ADULT - SITTING BALANCE: STATIC
Abdomen , no guarding or rigidity , soft, nontender, nondistended , normal bowel sounds , no masses palpable , Liver and Spleen , no hepatomegaly present , liver nontender , spleen not palpable
good balance

## 2023-10-17 NOTE — DISCHARGE NOTE PROVIDER - HOSPITAL COURSE
Orthopedic Summary  H&P:  Pt is a 89y Female    PAST MEDICAL & SURGICAL HISTORY:  Hypertension      History of total knee replacement  right      History of total hip arthroplasty  right with pinning      History of hip replacement, total, left            Now s/p ORIF Left Periprosthetic Hip Fracture. Pt is afebrile with stable vital signs. Pain is controlled. Exam reveals intact EHL FHL TA GS, +DP. Dressing is clean and dry.    Hospital Course:  Patient presented to [facility] ED after a fall, found to have a left periprosthetic hip fracture, and admitted to the Medical Service. Pt was medically cleared prior to surgery. Prophylactic antibiotics were started before the procedure and continued for 24 hours. They were admitted after surgery to the orthopedic floor.  There were no orthopedic complications during the hospital stay. All home medications were continued.    Routine consult was obtained from Physical Therapy and followed by both Medicine and Orthopedics for Co-management. Patient was placed on anticoagulation.  Pertinent home medications were continued.  Daily labs were followed.      On POD 0 there were no major issues. Pt received PT daily and was Discharged once cleared per Medicine.  The orthopedic Attending is aware and agrees. See addendum to DC summary per medical team below for any additional info or if any changes. 89-year-old Female  with PMH of HTN, OA, chronic venous insufficiency  presented to the emergency department of [facility] after a fall.  Mechanical trip and fall while she was with her friend and they were headed to a restaurant for Dinner. She fell in a parking lot.  Patient denies head injury or LOC.  She was unable to get up on her own.  Brought in by EMS for evaluation for left hip pain.  Patient has history of total hip replacement several years ago.Transferred  from [facility] to  due to finding of a left hip periprosthetic fracture. Accepted by ortho at .   She underwent left femur ORIF 10/16.   Post op required 1 unit prbcs. she is now medically stable for dc to [facility].     for physical exam please see progress note from 10/19/23    LABS:                        8.5    10.22 )-----------( 297      ( 19 Oct 2023 08:41 )             26.3     10-19    139  |  108  |  31<H>  ----------------------------<  157<H>  3.9   |  25  |  0.72    Ca    8.4<L>      19 Oct 2023 08:41     Xray Knee 3 Views, Left (10.14.23 @ 20:43) >  IMPRESSION:  Left proximal femur displaced fracture. No acute pulmonary disease.    Xray Chest 1 View- PORTABLE-Urgent (10.14.23 @ 20:41) >  No acute pulmonary disease.    FINAL DIAGNOSIS:  #S/p mechanical fall with LT periprosthetic femur fracture s/p ORIF  -POD#3  #Acute blood loss anemia-S/P 1 UNIT PRBCS.  #HTN    time taken for dc 42 min  d/w pt  summary to be faxed to pcp

## 2023-10-18 LAB
ALLERGY+IMMUNOLOGY DIAG STUDY NOTE: SIGNIFICANT CHANGE UP
ALLERGY+IMMUNOLOGY DIAG STUDY NOTE: SIGNIFICANT CHANGE UP
ANION GAP SERPL CALC-SCNC: 2 MMOL/L — LOW (ref 5–17)
ANION GAP SERPL CALC-SCNC: 2 MMOL/L — LOW (ref 5–17)
BLD GP AB SCN SERPL QL: SIGNIFICANT CHANGE UP
BLD GP AB SCN SERPL QL: SIGNIFICANT CHANGE UP
BUN SERPL-MCNC: 28 MG/DL — HIGH (ref 7–23)
BUN SERPL-MCNC: 28 MG/DL — HIGH (ref 7–23)
CALCIUM SERPL-MCNC: 8.3 MG/DL — LOW (ref 8.5–10.1)
CALCIUM SERPL-MCNC: 8.3 MG/DL — LOW (ref 8.5–10.1)
CHLORIDE SERPL-SCNC: 107 MMOL/L — SIGNIFICANT CHANGE UP (ref 96–108)
CHLORIDE SERPL-SCNC: 107 MMOL/L — SIGNIFICANT CHANGE UP (ref 96–108)
CO2 SERPL-SCNC: 28 MMOL/L — SIGNIFICANT CHANGE UP (ref 22–31)
CO2 SERPL-SCNC: 28 MMOL/L — SIGNIFICANT CHANGE UP (ref 22–31)
CREAT SERPL-MCNC: 0.68 MG/DL — SIGNIFICANT CHANGE UP (ref 0.5–1.3)
CREAT SERPL-MCNC: 0.68 MG/DL — SIGNIFICANT CHANGE UP (ref 0.5–1.3)
DIR ANTIGLOB POLYSPECIFIC INTERPRETATION: SIGNIFICANT CHANGE UP
DIR ANTIGLOB POLYSPECIFIC INTERPRETATION: SIGNIFICANT CHANGE UP
EGFR: 82 ML/MIN/1.73M2 — SIGNIFICANT CHANGE UP
EGFR: 82 ML/MIN/1.73M2 — SIGNIFICANT CHANGE UP
GLUCOSE SERPL-MCNC: 117 MG/DL — HIGH (ref 70–99)
GLUCOSE SERPL-MCNC: 117 MG/DL — HIGH (ref 70–99)
HAV IGM SER-ACNC: SIGNIFICANT CHANGE UP
HAV IGM SER-ACNC: SIGNIFICANT CHANGE UP
HCT VFR BLD CALC: 24.3 % — LOW (ref 34.5–45)
HCT VFR BLD CALC: 24.3 % — LOW (ref 34.5–45)
HGB BLD-MCNC: 7.8 G/DL — LOW (ref 11.5–15.5)
HGB BLD-MCNC: 7.8 G/DL — LOW (ref 11.5–15.5)
MCHC RBC-ENTMCNC: 29.2 PG — SIGNIFICANT CHANGE UP (ref 27–34)
MCHC RBC-ENTMCNC: 29.2 PG — SIGNIFICANT CHANGE UP (ref 27–34)
MCHC RBC-ENTMCNC: 32.1 GM/DL — SIGNIFICANT CHANGE UP (ref 32–36)
MCHC RBC-ENTMCNC: 32.1 GM/DL — SIGNIFICANT CHANGE UP (ref 32–36)
MCV RBC AUTO: 91 FL — SIGNIFICANT CHANGE UP (ref 80–100)
MCV RBC AUTO: 91 FL — SIGNIFICANT CHANGE UP (ref 80–100)
PLATELET # BLD AUTO: 259 K/UL — SIGNIFICANT CHANGE UP (ref 150–400)
PLATELET # BLD AUTO: 259 K/UL — SIGNIFICANT CHANGE UP (ref 150–400)
POTASSIUM SERPL-MCNC: 3.8 MMOL/L — SIGNIFICANT CHANGE UP (ref 3.5–5.3)
POTASSIUM SERPL-MCNC: 3.8 MMOL/L — SIGNIFICANT CHANGE UP (ref 3.5–5.3)
POTASSIUM SERPL-SCNC: 3.8 MMOL/L — SIGNIFICANT CHANGE UP (ref 3.5–5.3)
POTASSIUM SERPL-SCNC: 3.8 MMOL/L — SIGNIFICANT CHANGE UP (ref 3.5–5.3)
RBC # BLD: 2.67 M/UL — LOW (ref 3.8–5.2)
RBC # BLD: 2.67 M/UL — LOW (ref 3.8–5.2)
RBC # FLD: 15.7 % — HIGH (ref 10.3–14.5)
RBC # FLD: 15.7 % — HIGH (ref 10.3–14.5)
SODIUM SERPL-SCNC: 137 MMOL/L — SIGNIFICANT CHANGE UP (ref 135–145)
SODIUM SERPL-SCNC: 137 MMOL/L — SIGNIFICANT CHANGE UP (ref 135–145)
WBC # BLD: 10.25 K/UL — SIGNIFICANT CHANGE UP (ref 3.8–10.5)
WBC # BLD: 10.25 K/UL — SIGNIFICANT CHANGE UP (ref 3.8–10.5)
WBC # FLD AUTO: 10.25 K/UL — SIGNIFICANT CHANGE UP (ref 3.8–10.5)
WBC # FLD AUTO: 10.25 K/UL — SIGNIFICANT CHANGE UP (ref 3.8–10.5)

## 2023-10-18 PROCEDURE — 99232 SBSQ HOSP IP/OBS MODERATE 35: CPT

## 2023-10-18 PROCEDURE — 86077 PHYS BLOOD BANK SERV XMATCH: CPT

## 2023-10-18 RX ADMIN — Medication 2000 UNIT(S): at 09:53

## 2023-10-18 RX ADMIN — Medication 1 TABLET(S): at 09:53

## 2023-10-18 RX ADMIN — OXYCODONE HYDROCHLORIDE 10 MILLIGRAM(S): 5 TABLET ORAL at 11:16

## 2023-10-18 RX ADMIN — LATANOPROST 1 DROP(S): 0.05 SOLUTION/ DROPS OPHTHALMIC; TOPICAL at 21:47

## 2023-10-18 RX ADMIN — OXYCODONE HYDROCHLORIDE 10 MILLIGRAM(S): 5 TABLET ORAL at 11:58

## 2023-10-18 RX ADMIN — ENOXAPARIN SODIUM 40 MILLIGRAM(S): 100 INJECTION SUBCUTANEOUS at 05:55

## 2023-10-18 RX ADMIN — Medication 1 APPLICATION(S): at 12:59

## 2023-10-18 RX ADMIN — AMLODIPINE BESYLATE 5 MILLIGRAM(S): 2.5 TABLET ORAL at 09:52

## 2023-10-19 ENCOUNTER — TRANSCRIPTION ENCOUNTER (OUTPATIENT)
Age: 88
End: 2023-10-19

## 2023-10-19 VITALS
OXYGEN SATURATION: 93 % | SYSTOLIC BLOOD PRESSURE: 113 MMHG | DIASTOLIC BLOOD PRESSURE: 56 MMHG | TEMPERATURE: 99 F | RESPIRATION RATE: 17 BRPM | HEART RATE: 90 BPM

## 2023-10-19 LAB
ANION GAP SERPL CALC-SCNC: 6 MMOL/L — SIGNIFICANT CHANGE UP (ref 5–17)
ANION GAP SERPL CALC-SCNC: 6 MMOL/L — SIGNIFICANT CHANGE UP (ref 5–17)
BUN SERPL-MCNC: 31 MG/DL — HIGH (ref 7–23)
BUN SERPL-MCNC: 31 MG/DL — HIGH (ref 7–23)
CALCIUM SERPL-MCNC: 8.4 MG/DL — LOW (ref 8.5–10.1)
CALCIUM SERPL-MCNC: 8.4 MG/DL — LOW (ref 8.5–10.1)
CHLORIDE SERPL-SCNC: 108 MMOL/L — SIGNIFICANT CHANGE UP (ref 96–108)
CHLORIDE SERPL-SCNC: 108 MMOL/L — SIGNIFICANT CHANGE UP (ref 96–108)
CO2 SERPL-SCNC: 25 MMOL/L — SIGNIFICANT CHANGE UP (ref 22–31)
CO2 SERPL-SCNC: 25 MMOL/L — SIGNIFICANT CHANGE UP (ref 22–31)
CREAT SERPL-MCNC: 0.72 MG/DL — SIGNIFICANT CHANGE UP (ref 0.5–1.3)
CREAT SERPL-MCNC: 0.72 MG/DL — SIGNIFICANT CHANGE UP (ref 0.5–1.3)
EGFR: 78 ML/MIN/1.73M2 — SIGNIFICANT CHANGE UP
EGFR: 78 ML/MIN/1.73M2 — SIGNIFICANT CHANGE UP
GLUCOSE SERPL-MCNC: 157 MG/DL — HIGH (ref 70–99)
GLUCOSE SERPL-MCNC: 157 MG/DL — HIGH (ref 70–99)
HCT VFR BLD CALC: 26.3 % — LOW (ref 34.5–45)
HCT VFR BLD CALC: 26.3 % — LOW (ref 34.5–45)
HGB BLD-MCNC: 8.5 G/DL — LOW (ref 11.5–15.5)
HGB BLD-MCNC: 8.5 G/DL — LOW (ref 11.5–15.5)
MCHC RBC-ENTMCNC: 29.3 PG — SIGNIFICANT CHANGE UP (ref 27–34)
MCHC RBC-ENTMCNC: 29.3 PG — SIGNIFICANT CHANGE UP (ref 27–34)
MCHC RBC-ENTMCNC: 32.3 GM/DL — SIGNIFICANT CHANGE UP (ref 32–36)
MCHC RBC-ENTMCNC: 32.3 GM/DL — SIGNIFICANT CHANGE UP (ref 32–36)
MCV RBC AUTO: 90.7 FL — SIGNIFICANT CHANGE UP (ref 80–100)
MCV RBC AUTO: 90.7 FL — SIGNIFICANT CHANGE UP (ref 80–100)
PLATELET # BLD AUTO: 297 K/UL — SIGNIFICANT CHANGE UP (ref 150–400)
PLATELET # BLD AUTO: 297 K/UL — SIGNIFICANT CHANGE UP (ref 150–400)
POTASSIUM SERPL-MCNC: 3.9 MMOL/L — SIGNIFICANT CHANGE UP (ref 3.5–5.3)
POTASSIUM SERPL-MCNC: 3.9 MMOL/L — SIGNIFICANT CHANGE UP (ref 3.5–5.3)
POTASSIUM SERPL-SCNC: 3.9 MMOL/L — SIGNIFICANT CHANGE UP (ref 3.5–5.3)
POTASSIUM SERPL-SCNC: 3.9 MMOL/L — SIGNIFICANT CHANGE UP (ref 3.5–5.3)
RBC # BLD: 2.9 M/UL — LOW (ref 3.8–5.2)
RBC # BLD: 2.9 M/UL — LOW (ref 3.8–5.2)
RBC # FLD: 15.4 % — HIGH (ref 10.3–14.5)
RBC # FLD: 15.4 % — HIGH (ref 10.3–14.5)
SODIUM SERPL-SCNC: 139 MMOL/L — SIGNIFICANT CHANGE UP (ref 135–145)
SODIUM SERPL-SCNC: 139 MMOL/L — SIGNIFICANT CHANGE UP (ref 135–145)
WBC # BLD: 10.22 K/UL — SIGNIFICANT CHANGE UP (ref 3.8–10.5)
WBC # BLD: 10.22 K/UL — SIGNIFICANT CHANGE UP (ref 3.8–10.5)
WBC # FLD AUTO: 10.22 K/UL — SIGNIFICANT CHANGE UP (ref 3.8–10.5)
WBC # FLD AUTO: 10.22 K/UL — SIGNIFICANT CHANGE UP (ref 3.8–10.5)

## 2023-10-19 PROCEDURE — 99239 HOSP IP/OBS DSCHRG MGMT >30: CPT

## 2023-10-19 RX ORDER — OXYCODONE HYDROCHLORIDE 5 MG/1
1 TABLET ORAL
Qty: 0 | Refills: 0 | DISCHARGE
Start: 2023-10-19

## 2023-10-19 RX ORDER — CHOLECALCIFEROL (VITAMIN D3) 125 MCG
2000 CAPSULE ORAL
Qty: 0 | Refills: 0 | DISCHARGE
Start: 2023-10-19

## 2023-10-19 RX ORDER — SENNA PLUS 8.6 MG/1
2 TABLET ORAL
Qty: 0 | Refills: 0 | DISCHARGE
Start: 2023-10-19

## 2023-10-19 RX ORDER — POLYETHYLENE GLYCOL 3350 17 G/17G
17 POWDER, FOR SOLUTION ORAL
Qty: 0 | Refills: 0 | DISCHARGE
Start: 2023-10-19

## 2023-10-19 RX ORDER — TRAMADOL HYDROCHLORIDE 50 MG/1
1 TABLET ORAL
Qty: 0 | Refills: 0 | DISCHARGE
Start: 2023-10-19

## 2023-10-19 RX ORDER — ENOXAPARIN SODIUM 100 MG/ML
40 INJECTION SUBCUTANEOUS
Qty: 0 | Refills: 0 | DISCHARGE
Start: 2023-10-19

## 2023-10-19 RX ADMIN — Medication 2000 UNIT(S): at 09:19

## 2023-10-19 RX ADMIN — ENOXAPARIN SODIUM 40 MILLIGRAM(S): 100 INJECTION SUBCUTANEOUS at 05:20

## 2023-10-19 RX ADMIN — Medication 1 TABLET(S): at 09:19

## 2023-10-19 RX ADMIN — Medication 1 APPLICATION(S): at 09:30

## 2023-10-19 RX ADMIN — AMLODIPINE BESYLATE 5 MILLIGRAM(S): 2.5 TABLET ORAL at 09:18

## 2023-10-19 NOTE — PROGRESS NOTE ADULT - PROVIDER SPECIALTY LIST ADULT
Hospitalist
Hospitalist
Orthopedics
Hospitalist
Hospitalist
Orthopedics
Hospitalist

## 2023-10-19 NOTE — DISCHARGE NOTE NURSING/CASE MANAGEMENT/SOCIAL WORK - NSDCPEFALRISK_GEN_ALL_CORE
For information on Fall & Injury Prevention, visit: [email or web address] OR  [email or web address] OR  https://www.cdc.gov/steadi/patient.html

## 2023-10-19 NOTE — DISCHARGE NOTE NURSING/CASE MANAGEMENT/SOCIAL WORK - PATIENT PORTAL LINK FT
You can access the FollowMyHealth Patient Portal offered by [facility] by registering at the following website: [email or web address]. By joining [facility]’s FollowMyHealth portal, you will also be able to view your health information using other applications (apps) compatible with our system.

## 2023-10-19 NOTE — PROGRESS NOTE ADULT - SUBJECTIVE AND OBJECTIVE BOX
C/c: fall/left hip pain.     HPI:    89-year-old Female  with PMH of HTN, OA, chronic venous insufficiency  presented to the emergency department of [facility] after a fall.  Mechanical trip and fall while she was with her friend and they were headed to a restaurant for Dinner. She fell in a parking lot.  Patient denies head injury or LOC.  She was unable to get up on her own.  Brought in by EMS for evaluation for left hip pain.  Patient has history of total hip replacement several years ago.Transferred  from [facility] to  due to finding of a left hip periprosthetic fracture. Accepted by ortho at .   She underwent left femur ORIF 10/16.   Post op required 1 unit prbcs.       pt seen and examined this am. Feels well. Wants to go to [facility]. no sob/chest pain. Left thigh pain better. tolerating po. no difficulty voiding.     ROS: all 10 systems reviewed and is as above otherwise negative.     Vital Signs Last 24 Hrs  T(C): 37 (19 Oct 2023 08:00), Max: 37 (19 Oct 2023 08:00)  T(F): 98.6 (19 Oct 2023 08:00), Max: 98.6 (19 Oct 2023 08:00)  HR: 90 (19 Oct 2023 08:00) (89 - 94)  BP: 113/56 (19 Oct 2023 08:00) (88/47 - 116/60)  RR: 17 (19 Oct 2023 08:00) (17 - 18)  SpO2: 93% (19 Oct 2023 08:00) (93% - 98%)    PHYSICAL EXAM:    GENERAL: Comfortable, no acute distress   HEAD:  Normocephalic, atraumatic  EYES: EOMI, PERRLA  HEENT: Moist mucous membranes  NECK: Supple, No JVD  NERVOUS SYSTEM:  Alert & Oriented X3, non focal.  CHEST/LUNG: Clear to auscultation bilaterally  HEART: Regular rate and rhythm  ABDOMEN: Soft, Nontender, Nondistended, Bowel sounds present  GENITOURINARY: Voiding, no palpable bladder  EXTREMITIES:   No clubbing, cyanosis, or edema  MUSCULOSKELETAL- Left LE dressing intact. +underlying hematoma.  SKIN-no rash    LABS:                        8.5    10.22 )-----------( 297      ( 19 Oct 2023 08:41 )             26.3     10-19    139  |  108  |  31<H>  ----------------------------<  157<H>  3.9   |  25  |  0.72    Ca    8.4<L>      19 Oct 2023 08:41        Urinalysis Basic - ( 19 Oct 2023 08:41 )    Color: x / Appearance: x / SG: x / pH: x  Gluc: 157 mg/dL / Ketone: x  / Bili: x / Urobili: x   Blood: x / Protein: x / Nitrite: x   Leuk Esterase: x / RBC: x / WBC x   Sq Epi: x / Non Sq Epi: x / Bacteria: x    MEDICATIONS  (STANDING):  acetaminophen   IVPB .. 1000 milliGRAM(s) IV Intermittent once  amLODIPine   Tablet 5 milliGRAM(s) Oral daily  AQUAPHOR (petrolatum Ointment) 1 Application(s) Topical daily  cholecalciferol 2000 Unit(s) Oral at bedtime  enoxaparin Injectable 40 milliGRAM(s) SubCutaneous every 24 hours  HYDROmorphone  Injectable 0.5 milliGRAM(s) IV Push once  latanoprost 0.005% Ophthalmic Solution 1 Drop(s) Both EYES at bedtime  multivitamin 1 Tablet(s) Oral daily  polyethylene glycol 3350 17 Gram(s) Oral at bedtime  senna 2 Tablet(s) Oral at bedtime  sodium chloride 0.9%. 1000 milliLiter(s) (50 mL/Hr) IV Continuous <Continuous>  tranexamic acid IVPB 1000 milliGRAM(s) IV Intermittent once  tranexamic acid IVPB 1000 milliGRAM(s) IV Intermittent once    MEDICATIONS  (PRN):  aluminum hydroxide/magnesium hydroxide/simethicone Suspension 30 milliLiter(s) Oral four times a day PRN Indigestion  magnesium hydroxide Suspension 30 milliLiter(s) Oral daily PRN Constipation  ondansetron Injectable 4 milliGRAM(s) IV Push every 6 hours PRN Nausea and/or Vomiting  oxyCODONE    IR 5 milliGRAM(s) Oral every 4 hours PRN Moderate Pain (4 - 6)  oxyCODONE    IR 10 milliGRAM(s) Oral every 3 hours PRN Severe Pain (7 - 10)  traMADol 50 milliGRAM(s) Oral every 6 hours PRN Mild Pain (1 - 3)      Assessment/Plan:  #S/p mechanical fall with LT periprosthetic femur fracture s/p ORIF  -POD#3  -pain control  -physical therapy  -incentive spirometry  -bowel regimen.     #acute blood loss anemia:  -d/t fracture/surgery  -transfused 1 unit prbcs.     #HTN  -stable on norvasc    #DVT proph  - lovenox x4 weeks    dispo:  [facility] today.      
Patient seen and examined at bedside.  No acute complaints at this time. Pain controlled at rest. Denies chest pain, shortness of breath, nausea or vomiting.     PE:  Vital Signs Last 24 Hrs  T(C): 36.5 (10-15-23 @ 15:35), Max: 36.5 (10-15-23 @ 15:35)  T(F): 97.7 (10-15-23 @ 15:35), Max: 97.7 (10-15-23 @ 15:35)  HR: 80 (10-15-23 @ 15:35) (80 - 80)  BP: 133/69 (10-15-23 @ 15:35) (133/69 - 142/70)  BP(mean): --  RR: 18 (10-15-23 @ 15:35) (17 - 18)  SpO2: 97% (10-15-23 @ 15:35) (96% - 97%)    General: NAD, resting comfortably in bed  LLE:   Dressing C/D/I  SCDs present bilaterally  Compartments soft and compressible  No calf tenderness bilaterally  +TA/EHL/FHL/GSC  SILT L3-S1  + DP                            10.2   9.20  )-----------( 284      ( 15 Oct 2023 06:06 )             31.3     10-15    139  |  108  |  32<H>  ----------------------------<  112<H>  4.3   |  27  |  0.62    Ca    8.8      15 Oct 2023 06:06    TPro  x   /  Alb  3.3  /  TBili  x   /  DBili  x   /  AST  x   /  ALT  x   /  AlkPhos  x   10-15    PT/INR - ( 15 Oct 2023 06:06 )   PT: 11.8 sec;   INR: 1.05 ratio         PTT - ( 15 Oct 2023 06:06 )  PTT:35.5 sec    A/P:  89y f w/ L vanc B2 pp fx     -Plan for OR today w/ Dr. Camacho, circa 1330, for ORIF L femur vs revision IAN   -NPO for OR  -Please hold chemical VTE ppx for OR  -Preop labs: CBC/BMP/PTT/PT/T&S/INR  -NWB on the LLE  -Pain Control  -DVT ppxw/ SCDs  -FU AM Labs  -CXR/EKG Completed  -Please continue to document medical optimization  -Rest, ice, compress and elevate the extremity as we needed  -Incentive Spirometry  -Medical management appreciated  -Dispo: pending hospital course / PT eval post op 
Subjective:  Chief complain : Left hip pain    HPI:     89-year-old Female  with PMH of HTN, OA, chronic venous insufficiency  presented to the emergency department of [facility] after a fall.  Mechanical trip and fall while she was with her friend and they were headed to a restaurant for Dinner. She fell in a parking lot.  Patient denies head injury or LOC.  She was unable to get up on her own.  Brought in by EMS for evaluation for left hip pain.  No neck pain or back pain.  No numbness or tingling.  Patient has history of total hip replacement several years ago. No bowel or bladder incontinence. No wrist or shoulder pain. Transferred  from [facility] to  due to finding of a left hip periprosthetic fracture. Accepted by ortho at . Likely operative measure to fix the fracture.      10/15 -  Patient seen and examined at bedside earlier today, denies cp, dyspnea, abdominal pain , left hip pain well controlled, + thirsty   10/16 - pain in left hip persist, worse on movements, denies cp, dyspnea, abdominal pain, plan discussed  10/17/23- s/p LT femur ORIF yesterday. Up in a chair today, has LT thigh pain. HH dropped today    Review of system- Rest of the review of system are negative except mentioned in HPI    Vital Signs Last 24 Hrs  T(C): 36.7 (17 Oct 2023 12:17), Max: 36.7 (17 Oct 2023 08:13)  T(F): 98.1 (17 Oct 2023 12:17), Max: 98.1 (17 Oct 2023 08:13)  HR: 86 (17 Oct 2023 12:17) (74 - 90)  BP: 110/52 (17 Oct 2023 12:17) (110/52 - 148/68)  BP(mean): --  RR: 18 (17 Oct 2023 12:17) (17 - 25)  SpO2: 94% (17 Oct 2023 12:17) (93% - 100%)    Parameters below as of 17 Oct 2023 12:17  Patient On (Oxygen Delivery Method): room air    Physical exam:   General : NAD, appear to be of stated age , well groomed   NERVOUS SYSTEM:  Alert & Oriented X3, non- focal exam, Motor Strength 5/5 B/L upper and lower extremities; DTRs 2+ intact and symmetric, LLE shorter , externally rotated   HEAD:  Atraumatic, Normocephalic  EYES: EOMI, PERRLA, conjunctiva and sclera clear  HEENT: Moist mucous membranes, Supple neck , No JVD  CHEST: Clear to auscultation bilaterally; No rales, no rhonchi, no wheezing  HEART: Regular rate and rhythm; No murmurs, no rubs or gallops  ABDOMEN: Soft, Non-tender, Non-distended; Bowel sounds present, no guarding , no peritoneal irritation   GENITOURINARY- Desai+  EXTREMITIES:  2+ Peripheral Pulses, No clubbing, cyanosis, + 2 LE   edema with venous stasis skin changes  MUSCULOSKELETAL:- No muscle tenderness, Muscle tone normal, No joint tenderness, no Joint swelling,  Joint ROM –normal , + tenderness over left hip  SKIN-no rash, no lesion    Labs                                    7.9    10.81 )-----------( 245      ( 17 Oct 2023 07:39 )             24.4     17 Oct 2023 07:39    141    |  109    |  30     ----------------------------<  132    3.8     |  27     |  0.75     Ca    8.2        17 Oct 2023 07:39  Phos  2.8       17 Oct 2023 07:39  Mg     2.1       17 Oct 2023 07:39    TPro  6.3    /  Alb  2.3    /  TBili  0.3    /  DBili  x      /  AST  33     /  ALT  16     /  AlkPhos  77     17 Oct 2023 07:39    LIVER FUNCTIONS - ( 17 Oct 2023 07:39 )  Alb: 2.3 g/dL / Pro: 6.3 gm/dL / ALK PHOS: 77 U/L / ALT: 16 U/L / AST: 33 U/L / GGT: x           PT/INR - ( 16 Oct 2023 05:03 )   PT: 11.3 sec;   INR: 1.00 ratio       PTT - ( 16 Oct 2023 05:03 )  PTT:39.6 sec  CAPILLARY BLOOD GLUCOSE    CARDIAC MARKERS ( 16 Oct 2023 05:03 )  x     / x     / 211 U/L / x     / x        Urinalysis Basic - ( 17 Oct 2023 07:39 )  Color: x / Appearance: x / SG: x / pH: x  Gluc: 132 mg/dL / Ketone: x  / Bili: x / Urobili: x   Blood: x / Protein: x / Nitrite: x   Leuk Esterase: x / RBC: x / WBC x   Sq Epi: x / Non Sq Epi: x / Bacteria: x    MEDICATIONS  (STANDING):  acetaminophen   IVPB .. 1000 milliGRAM(s) IV Intermittent once  amLODIPine   Tablet 5 milliGRAM(s) Oral daily  AQUAPHOR (petrolatum Ointment) 1 Application(s) Topical daily  cholecalciferol 2000 Unit(s) Oral at bedtime  enoxaparin Injectable 40 milliGRAM(s) SubCutaneous every 24 hours  HYDROmorphone  Injectable 0.5 milliGRAM(s) IV Push once  latanoprost 0.005% Ophthalmic Solution 1 Drop(s) Both EYES at bedtime  multivitamin 1 Tablet(s) Oral daily  polyethylene glycol 3350 17 Gram(s) Oral at bedtime  senna 2 Tablet(s) Oral at bedtime  sodium chloride 0.9%. 1000 milliLiter(s) (50 mL/Hr) IV Continuous <Continuous>  tranexamic acid IVPB 1000 milliGRAM(s) IV Intermittent once  tranexamic acid IVPB 1000 milliGRAM(s) IV Intermittent once    MEDICATIONS  (PRN):  aluminum hydroxide/magnesium hydroxide/simethicone Suspension 30 milliLiter(s) Oral four times a day PRN Indigestion  magnesium hydroxide Suspension 30 milliLiter(s) Oral daily PRN Constipation  ondansetron Injectable 4 milliGRAM(s) IV Push every 6 hours PRN Nausea and/or Vomiting  oxyCODONE    IR 10 milliGRAM(s) Oral every 3 hours PRN Severe Pain (7 - 10)  oxyCODONE    IR 5 milliGRAM(s) Oral every 4 hours PRN Moderate Pain (4 - 6)  traMADol 50 milliGRAM(s) Oral every 6 hours PRN Mild Pain (1 - 3)    Assessment/Plan:  #S/p mechanical fall with LT periprosthetic femur fracture s/p ORIF  #Anemia acute blood loss from fracture/ postop  Ortho following  HH dropped- for 1U PRBC transfusion  Will need hematologist eval as blood has cold ab  Pain meds prn  Incentive spirometry  Bowel regimen  Desai placed in OR- voiding trial tonight    #HTN- BP so far stable postop  Cont Norvasc 5mg daily    #DVT proph- lovenox x4 weeks    #Dispo- transfuse today, mobilize with PT      
Patient seen and examined at bedside. Patient reports pain well controlled on medications. No acute events overnight. Pt denies fevers, chills, new onset numbness, weakness or tingling in the extremities.    T(C): 36.6 (10-17-23 @ 04:09), Max: 37.1 (10-16-23 @ 08:08)  T(F): 97.9 (10-17-23 @ 04:09), Max: 98.8 (10-16-23 @ 08:08)  HR: 88 (10-17-23 @ 04:09) (74 - 95)  BP: 123/54 (10-17-23 @ 04:09) (110/57 - 148/68)  RR: 18 (10-17-23 @ 04:09) (17 - 25)  SpO2: 98% (10-17-23 @ 04:09) (93% - 100%)  LUE:  Skin: No erythema, edema or gross lesions noted. Dressings c/d/i  Linda-incisional TTP, otherwise NTTP  SILT C5-T1  Motor: +AIN/PIN/U/Ax  Compartments soft and compressible  2+ RP    A/p:  Pt is a 89yF s/p ORIF L PPx IAN [place] B1  NWB LUE/PT/OT  Pain regimen PRN  DVT ppx: Lovenox  Post op abx ppx  Dispo per PT  Plan discussed w patient, who is in agreement with the above  Plan discussed w attending, who is in agreement with the above    
Subjective:  Chief complain : Left hip pain    HPI:     89-year-old Female  with PMH of HTN, OA, chronic venous insufficiency  presented to the emergency department of [facility] after a fall.  Mechanical trip and fall while she was with her friend and they were headed to a restaurant for Dinner. She fell in a parking lot.  Patient denies head injury or LOC.  She was unable to get up on her own.  Brought in by EMS for evaluation for left hip pain.  No neck pain or back pain.  No numbness or tingling.  Patient has history of total hip replacement several years ago. No bowel or bladder incontinence. No wrist or shoulder pain. Transferred  from [facility] to  due to finding of a left hip periprosthetic fracture. Accepted by ortho at . Likely operative measure to fix the fracture.      10/15 -  Patient seen and examined at bedside earlier today, denies cp, dyspnea, abdominal pain , left hip pain well controlled, + thirsty   10/16 - pain in left hip persist, worse on movements, denies cp, dyspnea, abdominal pain, plan discussed    Review of system- Rest of the review of system are negative except mentioned in HPI     Vital sings reviewed for last 24 h  T(C): 37.1 (10-16-23 @ 08:08), Max: 37.1 (10-16-23 @ 08:08)  T(F): 98.8 (10-16-23 @ 08:08), Max: 98.8 (10-16-23 @ 08:08)  HR: 95 (10-16-23 @ 08:08) (80 - 95)  BP: 144/66 (10-16-23 @ 08:08) (133/69 - 144/66)  RR: 18 (10-16-23 @ 08:08) (18 - 18)  SpO2: 93% (10-16-23 @ 08:08) (93% - 97%)      Physical exam:   General : NAD, appear to be of stated age , well groomed   NERVOUS SYSTEM:  Alert & Oriented X3, non- focal exam, Motor Strength 5/5 B/L upper and lower extremities; DTRs 2+ intact and symmetric, LLE shorter , externally rotated   HEAD:  Atraumatic, Normocephalic  EYES: EOMI, PERRLA, conjunctiva and sclera clear  HEENT: Moist mucous membranes, Supple neck , No JVD  CHEST: Clear to auscultation bilaterally; No rales, no rhonchi, no wheezing  HEART: Regular rate and rhythm; No murmurs, no rubs or gallops  ABDOMEN: Soft, Non-tender, Non-distended; Bowel sounds present, no guarding , no peritoneal irritation   GENITOURINARY- Voiding, no suprapubic tenderness  EXTREMITIES:  2+ Peripheral Pulses, No clubbing, cyanosis, + 2 LE   edema with venous stasis skin changes  MUSCULOSKELETAL:- No muscle tenderness, Muscle tone normal, No joint tenderness, no Joint swelling,  Joint ROM –normal , + tenderness over left hip  SKIN-no rash, no lesion    Labs radiologic and other test : all reviewed and interpreted :                         10.2   10.59 )-----------( 276      ( 16 Oct 2023 05:03 )             31.2     10-16    138  |  108  |  31<H>  ----------------------------<  120<H>  4.3   |  28  |  0.72    Ca    8.8      16 Oct 2023 05:03  Phos  2.7     10-16  Mg     2.1     10-16    TPro  x   /  Alb  3.3  /  TBili  x   /  DBili  x   /  AST  x   /  ALT  x   /  AlkPhos  x   10-15    CARDIAC MARKERS ( 16 Oct 2023 05:03 )  x     / x     / 211 U/L / x     / x      CARDIAC MARKERS ( 14 Oct 2023 19:41 )  x     / x     / 92 U/L / x     / x          LIVER FUNCTIONS - ( 15 Oct 2023 06:06 )  Alb: 3.3 g/dL / Pro: x     / ALK PHOS: x     / ALT: x     / AST: x     / GGT: x                 < from: CT Pelvis Bony Only No Cont (10.15.23 @ 02:50) >  1.  Acute oblique longitudinal periprosthetic subtrochanteric left   proximal femoral fracture with mild approximately 30 degrees apex   anterior angulation of the distal fragment. Adjacent small to moderate   volume intramuscular hematoma within left proximal vastus medialis.  2.  Additional chronic findings as detailed in the body section of this   report.    < end of copied text >        RECENT CULTURES:      Cardiac testing : reviewed   EKG   < from: 12 Lead ECG (10.14.23 @ 20:52) >  Sinus rhythm with 1st degree AV block 79   Otherwise normal ECG  When compared with ECG of 21-FEB-2023 18:30,  aberrant conduction is no longer present    < end of copied text >    Procedures :     Devices:     Current medications:  acetaminophen   IVPB .. 1000 milliGRAM(s) IV Intermittent once  amLODIPine   Tablet 5 milliGRAM(s) Oral daily  AQUAPHOR (petrolatum Ointment) 1 Application(s) Topical daily  cholecalciferol 2000 Unit(s) Oral at bedtime  latanoprost 0.005% Ophthalmic Solution 1 Drop(s) Both EYES at bedtime  morphine  - Injectable 2 milliGRAM(s) IV Push every 6 hours PRN  multivitamin 1 Tablet(s) Oral daily            
Patient seen and examined at bedside. Patient reports pain well controlled on medications. No acute events overnight.         LABS:                        7.8    10.25 )-----------( 259      ( 18 Oct 2023 08:18 )             24.3     10-18    137  |  107  |  28<H>  ----------------------------<  117<H>  3.8   |  28  |  0.68    Ca    8.3<L>      18 Oct 2023 08:18  Phos  2.8     10-17  Mg     2.1     10-17    TPro  6.3  /  Alb  2.3<L>  /  TBili  0.3  /  DBili  x   /  AST  33  /  ALT  16  /  AlkPhos  77  10-17      Urinalysis Basic - ( 18 Oct 2023 08:18 )    Color: x / Appearance: x / SG: x / pH: x  Gluc: 117 mg/dL / Ketone: x  / Bili: x / Urobili: x   Blood: x / Protein: x / Nitrite: x   Leuk Esterase: x / RBC: x / WBC x   Sq Epi: x / Non Sq Epi: x / Bacteria: x        VITAL SIGNS:  T(C): 36.8 (10-19-23 @ 00:12), Max: 36.9 (10-18-23 @ 14:51)  HR: 94 (10-19-23 @ 00:12) (89 - 98)  BP: 114/56 (10-19-23 @ 00:12) (88/47 - 116/60)  RR: 17 (10-19-23 @ 00:12) (17 - 18)  SpO2: 97% (10-19-23 @ 00:12) (94% - 98%)      LLE:  Dressings c/d/i  Linda-incisional TTP, otherwise NTTP  SILT dp/sp/saph/sural/tibial  Motor: +EHL/TA/Gsc  Compartments soft and compressible  +DP    A/p:  Pt is a 89yF s/p ORIF L PPx IAN [place] B1 (10/16)    Prevena for 7 days then switch to mepilex dressing   NWB LLE  PT/OT  Pain regimen PRN  Desai; OK to DC or mgmt per primary team   1U PRBC 10/17, 10/18  DVT ppx: Lovenox; per primary  Prevena: can be changed to meplex POD7 (please see d/c instructions for details)  Dispo: [facility]  medical management per primary team   stable for discharge from orthopedic perspective with outpatient fu  No further acute orthopaedic surgical intervention    
Patient seen and examined at bedside. Patient reports pain well controlled on medications. No acute events overnight.     Vital Signs Last 24 Hrs  T(C): 36.6 (18 Oct 2023 00:09), Max: 36.9 (17 Oct 2023 19:00)  T(F): 97.9 (18 Oct 2023 00:09), Max: 98.4 (17 Oct 2023 19:00)  HR: 87 (18 Oct 2023 00:09) (84 - 96)  BP: 122/53 (18 Oct 2023 00:09) (106/52 - 124/55)  BP(mean): --  RR: 17 (18 Oct 2023 00:09) (17 - 18)  SpO2: 97% (18 Oct 2023 00:09) (93% - 97%)    Parameters below as of 18 Oct 2023 00:09  Patient On (Oxygen Delivery Method): room air      LLE:  Dressings c/d/i  Linda-incisional TTP, otherwise NTTP  SILT dp/sp/saph/sural/tibial  Motor: +EHL/TA/Gsc  Compartments soft and compressible  +DP    A/p:  Pt is a 89yF s/p ORIF L PPx IAN [place] B1    Prevena for 7 days then switch to mepilex dressing   NWB LLE  PT/OT  Pain regimen PRN  DVT ppx: Lovenox  Dispo: [facility]  medical management per primary team   stable for discharge from orthopedic perspective with outpatient fu      
Subjective:  Chief complain : Left hip pain    HPI:     89-year-old Female  with PMH of HTN, OA, chronic venous insufficiency  presented to the emergency department of [facility] after a fall.  Mechanical trip and fall while she was with her friend and they were headed to a restaurant for Dinner. She fell in a parking lot.  Patient denies head injury or LOC.  She was unable to get up on her own.  Brought in by EMS for evaluation for left hip pain.  No neck pain or back pain.  No numbness or tingling.  Patient has history of total hip replacement several years ago. No bowel or bladder incontinence. No wrist or shoulder pain. Transferred  from [facility] to  due to finding of a left hip periprosthetic fracture. Accepted by ortho at . Likely operative measure to fix the fracture.      10/15 -  Patient seen and examined at bedside earlier today, denies cp, dyspnea, abdominal pain , left hip pain well controlled, + thirsty   10/16 - pain in left hip persist, worse on movements, denies cp, dyspnea, abdominal pain, plan discussed  10/17/23- s/p LT femur ORIF yesterday. Up in a chair today, has LT thigh pain. HH dropped today  10/18/23- has LT thigh stiffness/pain    Review of system- Rest of the review of system are negative except mentioned in HPI    Vital Signs Last 24 Hrs  T(C): 36.7 (18 Oct 2023 12:25), Max: 36.9 (17 Oct 2023 19:00)  T(F): 98.1 (18 Oct 2023 12:25), Max: 98.4 (17 Oct 2023 19:00)  HR: 91 (18 Oct 2023 12:25) (84 - 98)  BP: 110/59 (18 Oct 2023 12:25) (88/47 - 124/55)  BP(mean): --  RR: 18 (18 Oct 2023 12:25) (17 - 18)  SpO2: 94% (18 Oct 2023 12:25) (94% - 97%)    Parameters below as of 18 Oct 2023 12:25  Patient On (Oxygen Delivery Method): room air      Physical exam:   General : NAD, appear to be of stated age , well groomed   NERVOUS SYSTEM:  Alert & Oriented X3, non- focal exam, Motor Strength 5/5 B/L upper and lower extremities; DTRs 2+ intact and symmetric, LLE shorter , externally rotated   HEAD:  Atraumatic, Normocephalic  EYES: EOMI, PERRLA, conjunctiva and sclera clear  HEENT: Moist mucous membranes, Supple neck , No JVD  CHEST: Clear to auscultation bilaterally; No rales, no rhonchi, no wheezing  HEART: Regular rate and rhythm; No murmurs, no rubs or gallops  ABDOMEN: Soft, Non-tender, Non-distended; Bowel sounds present, no guarding , no peritoneal irritation   GENITOURINARY- Desai removed- voided  EXTREMITIES:  2+ Peripheral Pulses, No clubbing, cyanosis, + 2 LE   edema with venous stasis skin changes  MUSCULOSKELETAL:- No muscle tenderness, Muscle tone normal, No joint tenderness, no Joint swelling,  Joint ROM –normal , + tenderness over left hip  SKIN-no rash, no lesion    Labs                                  7.8    10.25 )-----------( 259      ( 18 Oct 2023 08:18 )             24.3     18 Oct 2023 08:18    137    |  107    |  28     ----------------------------<  117    3.8     |  28     |  0.68     Ca    8.3        18 Oct 2023 08:18  Phos  2.8       17 Oct 2023 07:39  Mg     2.1       17 Oct 2023 07:39    TPro  6.3    /  Alb  2.3    /  TBili  0.3    /  DBili  x      /  AST  33     /  ALT  16     /  AlkPhos  77     17 Oct 2023 07:39    LIVER FUNCTIONS - ( 17 Oct 2023 07:39 )  Alb: 2.3 g/dL / Pro: 6.3 gm/dL / ALK PHOS: 77 U/L / ALT: 16 U/L / AST: 33 U/L / GGT: x           Urinalysis Basic - ( 18 Oct 2023 08:18 )  Color: x / Appearance: x / SG: x / pH: x  Gluc: 117 mg/dL / Ketone: x  / Bili: x / Urobili: x   Blood: x / Protein: x / Nitrite: x   Leuk Esterase: x / RBC: x / WBC x   Sq Epi: x / Non Sq Epi: x / Bacteria: x    MEDICATIONS  (STANDING):  acetaminophen   IVPB .. 1000 milliGRAM(s) IV Intermittent once  amLODIPine   Tablet 5 milliGRAM(s) Oral daily  AQUAPHOR (petrolatum Ointment) 1 Application(s) Topical daily  cholecalciferol 2000 Unit(s) Oral at bedtime  enoxaparin Injectable 40 milliGRAM(s) SubCutaneous every 24 hours  HYDROmorphone  Injectable 0.5 milliGRAM(s) IV Push once  latanoprost 0.005% Ophthalmic Solution 1 Drop(s) Both EYES at bedtime  multivitamin 1 Tablet(s) Oral daily  polyethylene glycol 3350 17 Gram(s) Oral at bedtime  senna 2 Tablet(s) Oral at bedtime  sodium chloride 0.9%. 1000 milliLiter(s) (50 mL/Hr) IV Continuous <Continuous>  tranexamic acid IVPB 1000 milliGRAM(s) IV Intermittent once  tranexamic acid IVPB 1000 milliGRAM(s) IV Intermittent once    MEDICATIONS  (PRN):  aluminum hydroxide/magnesium hydroxide/simethicone Suspension 30 milliLiter(s) Oral four times a day PRN Indigestion  magnesium hydroxide Suspension 30 milliLiter(s) Oral daily PRN Constipation  ondansetron Injectable 4 milliGRAM(s) IV Push every 6 hours PRN Nausea and/or Vomiting  oxyCODONE    IR 10 milliGRAM(s) Oral every 3 hours PRN Severe Pain (7 - 10)  oxyCODONE    IR 5 milliGRAM(s) Oral every 4 hours PRN Moderate Pain (4 - 6)  traMADol 50 milliGRAM(s) Oral every 6 hours PRN Mild Pain (1 - 3)    Assessment/Plan:  #S/p mechanical fall with LT periprosthetic femur fracture s/p ORIF  #Anemia acute blood loss from fracture/ postop  Ortho following  S/p 1U PRBC transfusion 10/17  HH still low- for additional Unit today (blood to be transfused warm due to cold ab)  Pain meds prn  Incentive spirometry  Bowel regimen  Desai placed in OR- removed and voiding    #HTN- BP so far stable postop  Cont Norvasc 5mg daily    #DVT proph- lovenox x4 weeks    #Dispo- transfuse today, mobilize with PT. EVAN when HH stable      
Subjective:  Chief complain : Left hip pain    HPI:     89-year-old Female  with PMH of HTN, OA, chronic venous insufficiency  presented to the emergency department of [facility] after a fall.  Mechanical trip and fall while she was with her friend and they were headed to a restaurant for Dinner. She fell in a parking lot.  Patient denies head injury or LOC.  She was unable to get up on her own.  Brought in by EMS for evaluation for left hip pain.  No neck pain or back pain.  No numbness or tingling.  Patient has history of total hip replacement several years ago. No bowel or bladder incontinence. No wrist or shoulder pain. Transferred  from [facility] to  due to finding of a left hip periprosthetic fracture. Accepted by ortho at . Likely operative measure to fix the fracture.      10/15 -  Patient seen and examined at bedside earlier today, denies cp, dyspnea, abdominal pain , left hip pain well controlled, + thirsty     Review of system- Rest of the review of system are negative except mentioned in HPI     Vital sings reviewed for last 24 h  T(C): 36.5 (10-15-23 @ 15:35), Max: 36.5 (10-15-23 @ 02:05)  HR: 80 (10-15-23 @ 15:35) (76 - 84)  BP: 133/69 (10-15-23 @ 15:35) (124/78 - 155/80)  RR: 18 (10-15-23 @ 15:35) (16 - 18)  SpO2: 97% (10-15-23 @ 15:35) (95% - 98%)  Wt(kg): --  Daily Height in cm: 162.56 (15 Oct 2023 02:05)    Daily   CAPILLARY BLOOD GLUCOSE      Physical exam:   General : NAD, appear to be of stated age , well groomed   NERVOUS SYSTEM:  Alert & Oriented X3, non- focal exam, Motor Strength 5/5 B/L upper and lower extremities; DTRs 2+ intact and symmetric, LLE shorter , externally rotated   HEAD:  Atraumatic, Normocephalic  EYES: EOMI, PERRLA, conjunctiva and sclera clear  HEENT: Moist mucous membranes, Supple neck , No JVD  CHEST: Clear to auscultation bilaterally; No rales, no rhonchi, no wheezing  HEART: Regular rate and rhythm; No murmurs, no rubs or gallops  ABDOMEN: Soft, Non-tender, Non-distended; Bowel sounds present, no guarding , no peritoneal irritation   GENITOURINARY- Voiding, no suprapubic tenderness  EXTREMITIES:  2+ Peripheral Pulses, No clubbing, cyanosis, + 2 LE   edema with venous stasis skin changes  MUSCULOSKELETAL:- No muscle tenderness, Muscle tone normal, No joint tenderness, no Joint swelling,  Joint ROM –normal , + tenderness over left hip  SKIN-no rash, no lesion    Labs radiologic and other test : all reviewed and interpreted :                         10.2   9.20  )-----------( 284      ( 15 Oct 2023 06:06 )             31.3     10-15    139  |  108  |  32<H>  ----------------------------<  112<H>  4.3   |  27  |  0.62    Ca    8.8      15 Oct 2023 06:06    TPro  x   /  Alb  3.3  /  TBili  x   /  DBili  x   /  AST  x   /  ALT  x   /  AlkPhos  x   10-15    PT/INR - ( 15 Oct 2023 06:06 )   PT: 11.8 sec;   INR: 1.05 ratio         PTT - ( 15 Oct 2023 06:06 )  PTT:35.5 sec    CARDIAC MARKERS ( 14 Oct 2023 19:41 )  x     / x     / 92 U/L / x     / x        < from: CT Pelvis Bony Only No Cont (10.15.23 @ 02:50) >  1.  Acute oblique longitudinal periprosthetic subtrochanteric left   proximal femoral fracture with mild approximately 30 degrees apex   anterior angulation of the distal fragment. Adjacent small to moderate   volume intramuscular hematoma within left proximal vastus medialis.  2.  Additional chronic findings as detailed in the body section of this   report.    < end of copied text >        RECENT CULTURES:      Cardiac testing : reviewed   EKG   < from: 12 Lead ECG (10.14.23 @ 20:52) >  Sinus rhythm with 1st degree AV block 79   Otherwise normal ECG  When compared with ECG of 21-FEB-2023 18:30,  aberrant conduction is no longer present    < end of copied text >    Procedures :     Devices:     Current medications:  acetaminophen   IVPB .. 1000 milliGRAM(s) IV Intermittent once  amLODIPine   Tablet 5 milliGRAM(s) Oral daily  AQUAPHOR (petrolatum Ointment) 1 Application(s) Topical daily  cholecalciferol 2000 Unit(s) Oral at bedtime  latanoprost 0.005% Ophthalmic Solution 1 Drop(s) Both EYES at bedtime  morphine  - Injectable 2 milliGRAM(s) IV Push every 6 hours PRN  multivitamin 1 Tablet(s) Oral daily

## 2023-10-19 NOTE — PROGRESS NOTE ADULT - REASON FOR ADMISSION
Left hip periprosthetic fracture

## 2023-10-27 DIAGNOSIS — Z96.651 PRESENCE OF RIGHT ARTIFICIAL KNEE JOINT: ICD-10-CM

## 2023-10-27 DIAGNOSIS — M97.02XA PERIPROSTHETIC FRACTURE AROUND INTERNAL PROSTHETIC LEFT HIP JOINT, INITIAL ENCOUNTER: ICD-10-CM

## 2023-10-27 DIAGNOSIS — D62 ACUTE POSTHEMORRHAGIC ANEMIA: ICD-10-CM

## 2023-10-27 DIAGNOSIS — D59.12 COLD AUTOIMMUNE HEMOLYTIC ANEMIA: ICD-10-CM
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
## 2023-10-27 DIAGNOSIS — I87.2 VENOUS INSUFFICIENCY (CHRONIC) (PERIPHERAL): ICD-10-CM
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
## 2023-10-27 DIAGNOSIS — I10 ESSENTIAL (PRIMARY) HYPERTENSION: ICD-10-CM

## 2023-10-27 DIAGNOSIS — E55.9 VITAMIN D DEFICIENCY, UNSPECIFIED: ICD-10-CM

## 2023-10-27 DIAGNOSIS — R73.9 HYPERGLYCEMIA, UNSPECIFIED: ICD-10-CM

## 2023-10-27 DIAGNOSIS — S72.8X2A OTHER FRACTURE OF LEFT FEMUR, INITIAL ENCOUNTER FOR CLOSED FRACTURE: ICD-10-CM

## 2024-02-09 ENCOUNTER — APPOINTMENT (OUTPATIENT)
Dept: GERIATRICS | Facility: CLINIC | Age: 89
End: 2024-02-09
Payer: MEDICARE

## 2024-02-09 VITALS
DIASTOLIC BLOOD PRESSURE: 72 MMHG | OXYGEN SATURATION: 95 % | RESPIRATION RATE: 14 BRPM | HEIGHT: 67 IN | HEART RATE: 102 BPM | WEIGHT: 154 LBS | BODY MASS INDEX: 24.17 KG/M2 | TEMPERATURE: 97.6 F | SYSTOLIC BLOOD PRESSURE: 124 MMHG

## 2024-02-09 DIAGNOSIS — D64.9 ANEMIA, UNSPECIFIED: ICD-10-CM

## 2024-02-09 DIAGNOSIS — R26.89 OTHER ABNORMALITIES OF GAIT AND MOBILITY: ICD-10-CM

## 2024-02-09 DIAGNOSIS — Z96.642 PRESENCE OF LEFT ARTIFICIAL HIP JOINT: ICD-10-CM

## 2024-02-09 DIAGNOSIS — R79.89 OTHER SPECIFIED ABNORMAL FINDINGS OF BLOOD CHEMISTRY: ICD-10-CM

## 2024-02-09 PROCEDURE — G2211 COMPLEX E/M VISIT ADD ON: CPT

## 2024-02-09 PROCEDURE — 99214 OFFICE O/P EST MOD 30 MIN: CPT

## 2024-02-09 NOTE — ASSESSMENT
[FreeTextEntry1] : htn: controlled frail elderly patient c/w amlodpine 5mg daily  OP: s/p left hip fracture check labwork c/w alendronate weekly  impaired gait: c/w walker PT referral to continue  OA of right shoulder: prn tylenol recommended   edema of lower legs: improved  mild anemia and tsh of ~4 with prdm-  repeat labwork   dermatitis: improved c/w follow up with deramatolgoy

## 2024-02-09 NOTE — REVIEW OF SYSTEMS
[Fever] : no fever [Eyesight Problems] : no eyesight problems [Loss Of Hearing] : hearing loss [Chest Pain] : no chest pain [Palpitations] : no palpitations [Lower Ext Edema] : lower extremity edema [As Noted in HPI] : as noted in HPI [Negative] : Endocrine

## 2024-02-09 NOTE — HISTORY OF PRESENT ILLNESS
[FreeTextEntry1] : 89yoF seen for follow up for HTN. since last visit had fall with left hip fracture oct 2023 s/p orif doing well now back at home since jan and walking with walker denies pain working with PT at home   denies lightheadedness, dizziness, shortness of breath, or palpitations.  adherent with medication of amlodipine 5mg daily  lower ext edema,  improved swelling improved dermatatis  right shoulder OA: using cane now with right hand currently going to PT  doesn't take any pain medications currently  lives alone cooking without problems  [Any fall with injury in past year] : Patient reported fall with injury in the past year [Completely Independent] : Completely independent. [] : Assistance needed with housekeeping [0] : 2) Feeling down, depressed, or hopeless: Not at all (0) [PHQ-2 Negative - No further assessment needed] : PHQ-2 Negative - No further assessment needed

## 2024-02-09 NOTE — PHYSICAL EXAM
[Sclera] : the sclera and conjunctiva were normal [EOMI] : extraocular movements were intact [Normal Appearance] : the appearance of the neck was normal [Normal] : no respiratory distress, no accessory muscle use, normal respiratory rhythm and effort, lungs were clear to auscultation bilaterally [Normal S1, S2] : normal S1 and S2 [Heart Rate And Rhythm] : heart rate was normal and rhythm regular [Abdomen Tenderness] : non-tender [Abdomen Soft] : soft [No Clubbing, Cyanosis] : no clubbing or cyanosis of the fingernails [Involuntary Movements] : no involuntary movements were seen [Normal Color / Pigmentation] : normal skin color and pigmentation [No Focal Deficits] : no focal deficits [Normal Affect] : the affect was normal [Normal Mood] : the mood was normal [de-identified] : mild edema of lower legs [de-identified] : right shoulder pain with abduction

## 2024-02-12 ENCOUNTER — LABORATORY RESULT (OUTPATIENT)
Age: 89
End: 2024-02-12

## 2024-06-11 ENCOUNTER — APPOINTMENT (OUTPATIENT)
Dept: GERIATRICS | Facility: CLINIC | Age: 89
End: 2024-06-11

## 2024-06-12 ENCOUNTER — APPOINTMENT (OUTPATIENT)
Dept: GERIATRICS | Facility: CLINIC | Age: 89
End: 2024-06-12
Payer: MEDICARE

## 2024-06-12 VITALS
HEIGHT: 67 IN | RESPIRATION RATE: 16 BRPM | BODY MASS INDEX: 24.33 KG/M2 | DIASTOLIC BLOOD PRESSURE: 74 MMHG | OXYGEN SATURATION: 95 % | SYSTOLIC BLOOD PRESSURE: 130 MMHG | TEMPERATURE: 97.2 F | WEIGHT: 155 LBS | HEART RATE: 95 BPM

## 2024-06-12 DIAGNOSIS — R73.03 PREDIABETES.: ICD-10-CM

## 2024-06-12 DIAGNOSIS — M81.0 AGE-RELATED OSTEOPOROSIS W/OUT CURRENT PATHOLOGICAL FRACTURE: ICD-10-CM

## 2024-06-12 DIAGNOSIS — I10 ESSENTIAL (PRIMARY) HYPERTENSION: ICD-10-CM

## 2024-06-12 PROCEDURE — G2211 COMPLEX E/M VISIT ADD ON: CPT

## 2024-06-12 PROCEDURE — 99214 OFFICE O/P EST MOD 30 MIN: CPT

## 2024-06-12 NOTE — ASSESSMENT
[FreeTextEntry1] : htn: controlled c/w amlodpine 5mg daily  OP: s/p left hip fracture c/w alendronate weekly  impaired gait: improving- now using cane only  Predm2: 5.8%  anemia: 10.7 mild  dermatitis: improved c/w prn topical steroid

## 2024-06-12 NOTE — HISTORY OF PRESENT ILLNESS
[FreeTextEntry1] : 89yoF seen for follow up for HTN  fall oct 2023 s/p left hip fracture and orif now on fosamax walking with cane denies ongoing pain  htn: denies chest pain or palpitations adherent with norvasc  improved lower ext edema  lives alone cooking without problems driving and still working at [employer]

## 2024-06-12 NOTE — PHYSICAL EXAM
[EOMI] : extraocular movements were intact [Sclera] : the sclera and conjunctiva were normal [Normal Appearance] : the appearance of the neck was normal [Normal] : no respiratory distress, no accessory muscle use, normal respiratory rhythm and effort, lungs were clear to auscultation bilaterally [Normal S1, S2] : normal S1 and S2 [Heart Rate And Rhythm] : heart rate was normal and rhythm regular [Abdomen Tenderness] : non-tender [Abdomen Soft] : soft [No Clubbing, Cyanosis] : no clubbing or cyanosis of the fingernails [Involuntary Movements] : no involuntary movements were seen [Normal Color / Pigmentation] : normal skin color and pigmentation [No Focal Deficits] : no focal deficits [Normal Affect] : the affect was normal [Normal Mood] : the mood was normal [de-identified] : mild edema of lower legs [de-identified] : cane [de-identified] : dry skin to lower legs

## 2024-07-05 ENCOUNTER — RX RENEWAL (OUTPATIENT)
Age: 89
End: 2024-07-05

## 2024-07-29 ENCOUNTER — RX RENEWAL (OUTPATIENT)
Age: 89
End: 2024-07-29

## 2024-09-03 ENCOUNTER — APPOINTMENT (OUTPATIENT)
Dept: GERIATRICS | Facility: CLINIC | Age: 89
End: 2024-09-03
Payer: MEDICARE

## 2024-09-03 VITALS
HEIGHT: 67 IN | SYSTOLIC BLOOD PRESSURE: 130 MMHG | TEMPERATURE: 97.5 F | DIASTOLIC BLOOD PRESSURE: 64 MMHG | RESPIRATION RATE: 16 BRPM | HEART RATE: 100 BPM | BODY MASS INDEX: 24.26 KG/M2 | OXYGEN SATURATION: 96 % | WEIGHT: 154.54 LBS

## 2024-09-03 DIAGNOSIS — I10 ESSENTIAL (PRIMARY) HYPERTENSION: ICD-10-CM

## 2024-09-03 DIAGNOSIS — G56.02 CARPAL TUNNEL SYNDROME, LEFT UPPER LIMB: ICD-10-CM

## 2024-09-03 PROCEDURE — 99214 OFFICE O/P EST MOD 30 MIN: CPT

## 2024-09-03 PROCEDURE — G2211 COMPLEX E/M VISIT ADD ON: CPT

## 2024-09-03 NOTE — PHYSICAL EXAM
[Alert] : alert [No Acute Distress] : in no acute distress [Sclera] : the sclera and conjunctiva were normal [EOMI] : extraocular movements were intact [Normal Appearance] : the appearance of the neck was normal [No Respiratory Distress] : no respiratory distress [No Acc Muscle Use] : no accessory muscle use [Respiration, Rhythm And Depth] : normal respiratory rhythm and effort [Edema] : edema was not present [Normal Color / Pigmentation] : normal skin color and pigmentation [No Focal Deficits] : no focal deficits [Normal Affect] : the affect was normal [Normal Mood] : the mood was normal [de-identified] : no swelling of left DIP or PIP, no pain to palpation, reduced sensation to 1st and 2nd fingers

## 2024-09-03 NOTE — ASSESSMENT
[FreeTextEntry1] : CPS: left handed symptoms of left hand, hx of surgery now with recurrent symptoms ortho hand referral for evaluation, possible injection OT referral (once completes shoulders)  HTN: controlled c/w norvasc 5mg daily

## 2024-09-03 NOTE — HISTORY OF PRESENT ILLNESS
[FreeTextEntry1] : 89yo left handed female seen for acute visit for 2 weeks of left hand symptoms reports some numbness feeling in her first 2 fingers, associated with difficulty writing denies associated wrist pain, DIP or PIP joint pain, or trauma. denies worsening shoulder or neck pain has OA of shoulder at baseline and is working with OT  hx of surgery of left CPS more than 10 years ago  unsteady gait: walking with rollator  still works   htn: adherent with medication of norvasc denies any side effects

## 2024-09-30 ENCOUNTER — APPOINTMENT (OUTPATIENT)
Dept: ORTHOPEDIC SURGERY | Facility: CLINIC | Age: 89
End: 2024-09-30

## 2024-10-08 ENCOUNTER — APPOINTMENT (OUTPATIENT)
Dept: GERIATRICS | Facility: CLINIC | Age: 89
End: 2024-10-08
Payer: MEDICARE

## 2024-10-08 VITALS
SYSTOLIC BLOOD PRESSURE: 114 MMHG | DIASTOLIC BLOOD PRESSURE: 62 MMHG | HEART RATE: 100 BPM | HEIGHT: 67 IN | TEMPERATURE: 97.8 F | RESPIRATION RATE: 16 BRPM | OXYGEN SATURATION: 95 % | BODY MASS INDEX: 22.76 KG/M2 | WEIGHT: 145 LBS

## 2024-10-08 DIAGNOSIS — I10 ESSENTIAL (PRIMARY) HYPERTENSION: ICD-10-CM

## 2024-10-08 DIAGNOSIS — R26.89 OTHER ABNORMALITIES OF GAIT AND MOBILITY: ICD-10-CM

## 2024-10-08 DIAGNOSIS — R05.1 ACUTE COUGH: ICD-10-CM

## 2024-10-08 PROCEDURE — 99214 OFFICE O/P EST MOD 30 MIN: CPT

## 2024-10-08 PROCEDURE — G2211 COMPLEX E/M VISIT ADD ON: CPT

## 2024-10-09 DIAGNOSIS — R13.10 DYSPHAGIA, UNSPECIFIED: ICD-10-CM

## 2024-10-09 LAB
INFLUENZA A RESULT: NOT DETECTED
INFLUENZA B RESULT: NOT DETECTED
RESP SYN VIRUS RESULT: NOT DETECTED
SARS-COV-2 RESULT: NOT DETECTED

## 2024-10-10 ENCOUNTER — APPOINTMENT (OUTPATIENT)
Dept: RADIOLOGY | Facility: CLINIC | Age: 89
End: 2024-10-10
Payer: MEDICARE

## 2024-10-10 ENCOUNTER — OUTPATIENT (OUTPATIENT)
Dept: OUTPATIENT SERVICES | Facility: HOSPITAL | Age: 89
LOS: 1 days | End: 2024-10-10
Payer: MEDICARE

## 2024-10-10 DIAGNOSIS — R05.1 ACUTE COUGH: ICD-10-CM

## 2024-10-10 DIAGNOSIS — Z96.642 PRESENCE OF LEFT ARTIFICIAL HIP JOINT: Chronic | ICD-10-CM

## 2024-10-10 DIAGNOSIS — Z96.659 PRESENCE OF UNSPECIFIED ARTIFICIAL KNEE JOINT: Chronic | ICD-10-CM

## 2024-10-10 DIAGNOSIS — Z96.649 PRESENCE OF UNSPECIFIED ARTIFICIAL HIP JOINT: Chronic | ICD-10-CM

## 2024-10-10 PROCEDURE — 71046 X-RAY EXAM CHEST 2 VIEWS: CPT | Mod: 26

## 2024-10-14 ENCOUNTER — NON-APPOINTMENT (OUTPATIENT)
Age: 89
End: 2024-10-14

## 2024-11-04 ENCOUNTER — OUTPATIENT (OUTPATIENT)
Dept: OUTPATIENT SERVICES | Facility: HOSPITAL | Age: 89
LOS: 1 days | End: 2024-11-04
Payer: MEDICARE

## 2024-11-04 ENCOUNTER — NON-APPOINTMENT (OUTPATIENT)
Age: 89
End: 2024-11-04

## 2024-11-04 DIAGNOSIS — R13.10 DYSPHAGIA, UNSPECIFIED: ICD-10-CM

## 2024-11-04 DIAGNOSIS — Z96.642 PRESENCE OF LEFT ARTIFICIAL HIP JOINT: Chronic | ICD-10-CM

## 2024-11-04 DIAGNOSIS — R05.1 ACUTE COUGH: ICD-10-CM

## 2024-11-04 DIAGNOSIS — Z96.649 PRESENCE OF UNSPECIFIED ARTIFICIAL HIP JOINT: Chronic | ICD-10-CM

## 2024-11-04 PROCEDURE — 74230 X-RAY XM SWLNG FUNCJ C+: CPT | Mod: 26

## 2024-11-05 ENCOUNTER — APPOINTMENT (OUTPATIENT)
Dept: GERIATRICS | Facility: CLINIC | Age: 89
End: 2024-11-05
Payer: MEDICARE

## 2024-11-05 VITALS
SYSTOLIC BLOOD PRESSURE: 100 MMHG | RESPIRATION RATE: 16 BRPM | HEART RATE: 89 BPM | OXYGEN SATURATION: 96 % | TEMPERATURE: 96.7 F | DIASTOLIC BLOOD PRESSURE: 60 MMHG | BODY MASS INDEX: 21.82 KG/M2 | WEIGHT: 139 LBS | HEIGHT: 67 IN

## 2024-11-05 DIAGNOSIS — R06.02 SHORTNESS OF BREATH: ICD-10-CM

## 2024-11-05 DIAGNOSIS — R13.10 DYSPHAGIA, UNSPECIFIED: ICD-10-CM

## 2024-11-05 DIAGNOSIS — R26.89 OTHER ABNORMALITIES OF GAIT AND MOBILITY: ICD-10-CM

## 2024-11-05 DIAGNOSIS — Z23 ENCOUNTER FOR IMMUNIZATION: ICD-10-CM

## 2024-11-05 DIAGNOSIS — I10 ESSENTIAL (PRIMARY) HYPERTENSION: ICD-10-CM

## 2024-11-05 PROCEDURE — G0008: CPT

## 2024-11-05 PROCEDURE — 90662 IIV NO PRSV INCREASED AG IM: CPT

## 2024-11-05 PROCEDURE — G2211 COMPLEX E/M VISIT ADD ON: CPT

## 2024-11-05 PROCEDURE — 99215 OFFICE O/P EST HI 40 MIN: CPT

## 2024-11-05 RX ORDER — BLOOD-GLUCOSE METER
KIT MISCELLANEOUS
Qty: 1 | Refills: 0 | Status: ACTIVE | COMMUNITY
Start: 2024-11-05 | End: 1900-01-01

## 2024-11-06 ENCOUNTER — NON-APPOINTMENT (OUTPATIENT)
Age: 89
End: 2024-11-06

## 2024-11-08 ENCOUNTER — APPOINTMENT (OUTPATIENT)
Dept: GERIATRICS | Facility: CLINIC | Age: 89
End: 2024-11-08

## 2024-11-18 ENCOUNTER — APPOINTMENT (OUTPATIENT)
Dept: GERIATRICS | Facility: CLINIC | Age: 89
End: 2024-11-18
Payer: MEDICARE

## 2024-11-18 VITALS
WEIGHT: 130 LBS | HEIGHT: 67 IN | OXYGEN SATURATION: 97 % | HEART RATE: 80 BPM | DIASTOLIC BLOOD PRESSURE: 54 MMHG | TEMPERATURE: 96.6 F | BODY MASS INDEX: 20.4 KG/M2 | RESPIRATION RATE: 18 BRPM | SYSTOLIC BLOOD PRESSURE: 100 MMHG

## 2024-11-18 DIAGNOSIS — R13.10 DYSPHAGIA, UNSPECIFIED: ICD-10-CM

## 2024-11-18 DIAGNOSIS — R63.4 ABNORMAL WEIGHT LOSS: ICD-10-CM

## 2024-11-18 DIAGNOSIS — R54 AGE-RELATED PHYSICAL DEBILITY: ICD-10-CM

## 2024-11-18 DIAGNOSIS — R26.89 OTHER ABNORMALITIES OF GAIT AND MOBILITY: ICD-10-CM

## 2024-11-18 PROCEDURE — G2211 COMPLEX E/M VISIT ADD ON: CPT

## 2024-11-18 PROCEDURE — 99214 OFFICE O/P EST MOD 30 MIN: CPT

## 2024-11-20 PROCEDURE — 71046 X-RAY EXAM CHEST 2 VIEWS: CPT

## 2024-11-20 PROCEDURE — 74230 X-RAY XM SWLNG FUNCJ C+: CPT

## 2024-11-25 ENCOUNTER — NON-APPOINTMENT (OUTPATIENT)
Age: 89
End: 2024-11-25

## 2024-11-25 DIAGNOSIS — E43 UNSPECIFIED SEVERE PROTEIN-CALORIE MALNUTRITION: ICD-10-CM

## 2024-11-26 DIAGNOSIS — G47.00 INSOMNIA, UNSPECIFIED: ICD-10-CM

## 2024-11-26 RX ORDER — ALPRAZOLAM 0.25 MG/1
0.25 TABLET ORAL
Qty: 30 | Refills: 0 | Status: ACTIVE | COMMUNITY
Start: 2024-11-26 | End: 1900-01-01

## 2024-12-02 ENCOUNTER — APPOINTMENT (OUTPATIENT)
Age: 88
End: 2024-12-02

## 2024-12-10 ENCOUNTER — APPOINTMENT (OUTPATIENT)
Dept: RADIOLOGY | Facility: HOSPITAL | Age: 88
End: 2024-12-10

## 2025-08-08 NOTE — ED ADULT TRIAGE NOTE - WEIGHT METHOD
Subjective:       Patient ID: Nuha Bernstein is a 80 y.o. female.    Chief Complaint: Follow-up and Chronic Kidney Disease    HPI     Nuha Bernstein is a 80 y.o. female patient that presents to clinic for follow up. Past medical and surgical history reviewed as listed. PCP is Pollo Lowe MD , she is new to me. Patient would like to establish care with Dr. Aceves. Appt scheduled.     Shoulder Pain   The pain is present in the right shoulder and left shoulder. This is a chronic problem. The current episode started more than 1 year ago. There has been no history of extremity trauma. The quality of the pain is described as aching. The pain is mild. Associated symptoms include headaches, a limited range of motion and stiffness. Pertinent negatives include no inability to bear weight or numbness. The symptoms are aggravated by lying down. She has tried NSAIDS, oral narcotics, heat and rest for the symptoms. The treatment provided mild relief. Family history includes arthritis. There is no history of diabetes.   Hypertension  This is a new problem. The current episode started more than 1 year ago. The problem is uncontrolled. Associated symptoms include headaches. Pertinent negatives include no neck pain, palpitations, peripheral edema or shortness of breath. Risk factors for coronary artery disease include post-menopausal state and obesity. Past treatments include ACE inhibitors and diuretics. The current treatment provides moderate improvement. Compliance problems include diet.  Hypertensive end-organ damage includes kidney disease. Identifiable causes of hypertension include chronic renal disease.     CKD- unstable. Last eGFR 26; BUN 46, Creatinine 1.9-5/19/25   Still uses ibuprofen prn for joint pain.   Eats sodium in diet. Eats pork.     States she does exercise at home via zoom daily for 15-20 minutes.     ROS as listed.     Past Medical History:   Diagnosis Date    Arthritis     Back pain     CKD stage  G3a/A3, GFR 45-59 and albumin creatinine ratio >300 mg/g 6/23/2020    Colon polyp     Hyperglycemia     Hyperlipidemia     Hypertension     Nuclear sclerosis of both eyes 10/31/2017    Prolapse of uterus     Type 2 diabetes mellitus without complication, without long-term current use of insulin     Type 2 diabetes mellitus without complication, without long-term current use of insulin       Past Surgical History:   Procedure Laterality Date    CATARACT EXTRACTION W/  INTRAOCULAR LENS IMPLANT Right 11/26/2024    Procedure: EXTRACTION, CATARACT, WITH IOL INSERTION;  Surgeon: Olivier Montez MD;  Location: [facility];  Service: Ophthalmology;  Laterality: Right;    CATARACT EXTRACTION W/  INTRAOCULAR LENS IMPLANT Left 12/10/2024    Procedure: EXTRACTION, CATARACT, WITH IOL INSERTION;  Surgeon: Olivier Montze MD;  Location: [facility];  Service: Ophthalmology;  Laterality: Left;    COLONOSCOPY N/A 12/15/2020    Procedure: COLONOSCOPY;  Surgeon: Antonio Maya MD;  Location: [facility] ([address] [place] [address]);  Service: Endoscopy;  Laterality: N/A;  prep ins. emailed - ERW  covid test on 12/12/20 -PCW-BB    COLONOSCOPY W/ POLYPECTOMY      Right Thumb      vaginal dilator        Family History   Problem Relation Name Age of Onset    Hypertension Mother      Cataracts Mother      Heart disease Mother      Hypertension Father      Cataracts Father      No Known Problems Sister      No Known Problems Brother      No Known Problems Brother      No Known Problems Maternal Aunt      No Known Problems Maternal Uncle      No Known Problems Paternal Aunt      No Known Problems Paternal Uncle      No Known Problems Maternal Grandmother      No Known Problems Maternal Grandfather      No Known Problems Paternal Grandmother      No Known Problems Paternal Grandfather      No Known Problems Other      Amblyopia Neg Hx      Blindness Neg Hx      Cancer Neg Hx      Diabetes Neg Hx      Glaucoma Neg Hx      Macular degeneration Neg Hx       "Retinal detachment Neg Hx      Strabismus Neg Hx      Stroke Neg Hx      Thyroid disease Neg Hx        Review of patient's allergies indicates:  No Known Allergies  Review of Systems   Respiratory:  Negative for shortness of breath.    Cardiovascular:  Negative for palpitations.   Musculoskeletal:  Positive for stiffness. Negative for neck pain.   Neurological:  Positive for headaches. Negative for numbness.       Objective:      Vitals:    08/08/25 1047 08/08/25 1127   BP: (!) 150/82 (!) 150/80   Pulse: 76 76   Temp: 98.4 °F (36.9 °C)    TempSrc: Oral    SpO2: (!) 93% 95%   Weight: 78.5 kg (173 lb 1 oz)    Height: 5' 5" (1.651 m)       Physical Exam  Vitals and nursing note reviewed.   Constitutional:       General: She is not in acute distress.  HENT:      Head: Normocephalic.   Eyes:      Conjunctiva/sclera: Conjunctivae normal.      Pupils: Pupils are equal, round, and reactive to light.   Cardiovascular:      Rate and Rhythm: Normal rate and regular rhythm.      Heart sounds: Normal heart sounds. No murmur heard.  Pulmonary:      Effort: Pulmonary effort is normal. No respiratory distress.      Breath sounds: Normal breath sounds.   Musculoskeletal:      Right shoulder: Tenderness and crepitus present. Decreased range of motion.      Left shoulder: Crepitus present. Decreased range of motion.      Right upper arm: Deformity present.      Cervical back: Normal range of motion.   Skin:     General: Skin is warm and dry.   Neurological:      Mental Status: She is alert and oriented to person, place, and time.      Gait: Gait normal.   Psychiatric:         Mood and Affect: Mood normal.         Behavior: Behavior normal.         Lab Results   Component Value Date    WBC 6.07 05/19/2025    HGB 12.3 05/19/2025    HCT 39.1 05/19/2025     05/19/2025    CHOL 202 (H) 05/19/2025    TRIG 89 05/19/2025    HDL 64 05/19/2025    ALT 35 05/19/2025    AST 41 05/19/2025     05/19/2025    K 4.6 05/19/2025     " 05/19/2025    CREATININE 1.9 (H) 05/19/2025    BUN 46 (H) 05/19/2025    CO2 26 05/19/2025    TSH 1.057 06/24/2024    HGBA1C 6.2 (H) 05/19/2025      Assessment:       1. Stage 4 chronic kidney disease    2. Essential hypertension    3. Prediabetes    4. Chronic pain of both shoulders    5. Rupture of right biceps tendon, subsequent encounter        Plan:       Stage 4 chronic kidney disease  Monitor renal function.    Referral placed to Nephrology for further evaluation of CKD stage 4.  Advised patient to avoid NSAIDs and nephrotoxic antibiotics at this time.  Continue lisinopril for hypertension.  Check labs.  Provided patient with chronic kidney disease education.  -     KIDNEY DISEASE EDUCATION; Future; Expected date: 08/08/2025  -     Ambulatory referral/consult to Nephrology; Future; Expected date: 08/08/2025  -     Comprehensive Metabolic Panel; Future; Expected date: 08/08/2025  -     Uric Acid; Future; Expected date: 08/08/2025  -     Microalbumin/Creatinine Ratio, Urine; Future; Expected date: 08/08/2025  -     PTH, Intact; Future; Expected date: 08/08/2025  -     CBC Auto Differential; Future; Expected date: 08/08/2025  -     Vitamin D; Future; Expected date: 08/08/2025    Essential hypertension  Continue lisinopril 40 mg p.o. daily.  Continue triamterene-hydrochlorothiazide 37.5-25 mg p.o. daily.  Recommend alternative spironolactone.  Will defer to Nephrology.    Enroll patient in hypertension digital medicine program.  -     Hypertension Digital Medicine (HDMP) Enrollment Order    Prediabetes  Repeat A1c q.6 months  Chronic pain of both shoulders  Check x-ray to rule out fracture.  -     X-ray Shoulder 2 or More Views Right; Future; Expected date: 08/08/2025  -     Ambulatory referral/consult to Orthopedics; Future; Expected date: 08/08/2025    Rupture of right biceps tendon, subsequent encounter  -     Ambulatory referral/consult to Orthopedics; Future; Expected date: 08/08/2025      Medication List  with Changes/Refills   Current Medications    ATORVASTATIN (LIPITOR) 40 MG TABLET    Take 1 tablet (40 mg total) by mouth once daily.    CO-ENZYME Q-10 30 MG CAPSULE    Take 30 mg by mouth 3 (three) times daily.    LISINOPRIL (PRINIVIL,ZESTRIL) 40 MG TABLET    TAKE 1 TABLET BY MOUTH EVERY DAY    MULTIVITAMIN/IRON/FOLIC ACID (CENTRUM WOMEN ORAL)    Take by mouth.    MV,TRISHA,IRON,MN/FOLIC ACID/CHOL (HAIR-SKIN-NAILS, PABA, ORAL)    Take by mouth.    OMEGA 3,6,9 COMBINATION NO.7 (OMEGA DHA ORAL)    Take 830 mg by mouth once daily.    PREDNISOLONE-MOXIFLOX-BROMFEN 1-0.5-0.075 % DRPS    Place 1 drop into the right eye 3 (three) times daily.    TRAMADOL (ULTRAM) 50 MG TABLET    Take 1 tablet (50 mg total) by mouth every 8 (eight) hours as needed for Pain.    TRIAMTERENE-HYDROCHLOROTHIAZIDE 37.5-25 MG (DYAZIDE) 37.5-25 MG PER CAPSULE    TAKE 1 CAPSULE BY MOUTH EVERY DAY       Follow up in about 2 months (around 10/8/2025), or for annual eye exam please schedule, for Opthalmology.    Patient will follow-up with Dr. Rey to establish care.       Melina Briggs, KIMBERLEY, APRN, FNP-C  Family Medicine  [facility] Cheryl Guti[facility]rez  08/08/2025 11:10 AM     stated